# Patient Record
Sex: FEMALE | Race: WHITE | NOT HISPANIC OR LATINO | Employment: FULL TIME | URBAN - METROPOLITAN AREA
[De-identification: names, ages, dates, MRNs, and addresses within clinical notes are randomized per-mention and may not be internally consistent; named-entity substitution may affect disease eponyms.]

---

## 2019-01-04 RX ORDER — IBUPROFEN 800 MG/1
TABLET ORAL
Qty: 90 TABLET | Refills: 1 | Status: SHIPPED | OUTPATIENT
Start: 2019-01-04 | End: 2019-06-03 | Stop reason: SDUPTHER

## 2019-01-09 ENCOUNTER — TELEPHONE (OUTPATIENT)
Dept: INTERNAL MEDICINE | Facility: CLINIC | Age: 49
End: 2019-01-09

## 2019-01-09 DIAGNOSIS — L65.9 HAIR LOSS: ICD-10-CM

## 2019-01-09 DIAGNOSIS — E55.9 VITAMIN D DEFICIENCY: Primary | ICD-10-CM

## 2019-01-09 DIAGNOSIS — R53.83 FATIGUE, UNSPECIFIED TYPE: ICD-10-CM

## 2019-01-09 DIAGNOSIS — E53.8 B12 DEFICIENCY: ICD-10-CM

## 2019-01-09 DIAGNOSIS — D50.9 IRON DEFICIENCY ANEMIA, UNSPECIFIED IRON DEFICIENCY ANEMIA TYPE: ICD-10-CM

## 2019-01-09 NOTE — TELEPHONE ENCOUNTER
PT CALLED STATING THAT SHE HAS AN APPOINTMENT ON 1/11/2019 WITH [facility]. SHE STATES THAT SHE HAS HAD INCREASED HAIR LOSS, EXTREME FATIGUE, AND INCREASED APPETITE. .  SHE WOULD LIKE HER LAB ORDERS TO BE PUT IN PRIOR TO HER APT ON 1/11/2019.

## 2019-01-11 ENCOUNTER — OFFICE VISIT (OUTPATIENT)
Dept: INTERNAL MEDICINE | Facility: CLINIC | Age: 49
End: 2019-01-11

## 2019-01-11 ENCOUNTER — LAB (OUTPATIENT)
Dept: LAB | Facility: HOSPITAL | Age: 49
End: 2019-01-11

## 2019-01-11 VITALS — DIASTOLIC BLOOD PRESSURE: 88 MMHG | HEART RATE: 96 BPM | SYSTOLIC BLOOD PRESSURE: 118 MMHG | WEIGHT: 166 LBS

## 2019-01-11 DIAGNOSIS — E55.9 VITAMIN D DEFICIENCY: ICD-10-CM

## 2019-01-11 DIAGNOSIS — R63.5 ABNORMAL WEIGHT GAIN: ICD-10-CM

## 2019-01-11 DIAGNOSIS — E04.2 GOITER, NONTOXIC, MULTINODULAR: ICD-10-CM

## 2019-01-11 DIAGNOSIS — E53.8 B12 DEFICIENCY: ICD-10-CM

## 2019-01-11 DIAGNOSIS — D50.9 IRON DEFICIENCY ANEMIA, UNSPECIFIED IRON DEFICIENCY ANEMIA TYPE: ICD-10-CM

## 2019-01-11 DIAGNOSIS — E61.1 IRON DEFICIENCY: ICD-10-CM

## 2019-01-11 DIAGNOSIS — F41.1 ANXIETY STATE: ICD-10-CM

## 2019-01-11 DIAGNOSIS — L65.9 HAIR LOSS: ICD-10-CM

## 2019-01-11 DIAGNOSIS — R53.83 FATIGUE, UNSPECIFIED TYPE: ICD-10-CM

## 2019-01-11 DIAGNOSIS — L65.9 HAIR LOSS: Primary | ICD-10-CM

## 2019-01-11 PROBLEM — F51.01 PRIMARY INSOMNIA: Status: ACTIVE | Noted: 2019-01-11

## 2019-01-11 PROBLEM — F33.1 MAJOR DEPRESSIVE DISORDER, RECURRENT, MODERATE: Status: ACTIVE | Noted: 2019-01-11

## 2019-01-11 PROBLEM — M79.10 MYALGIA: Status: ACTIVE | Noted: 2019-01-11

## 2019-01-11 PROBLEM — M54.2 CERVICALGIA: Status: ACTIVE | Noted: 2019-01-11

## 2019-01-11 LAB
25(OH)D3 SERPL-MCNC: 21 NG/ML
ALBUMIN SERPL-MCNC: 4.43 G/DL (ref 3.2–4.8)
ALBUMIN/GLOB SERPL: 2.2 G/DL (ref 1.5–2.5)
ALP SERPL-CCNC: 68 U/L (ref 25–100)
ALT SERPL W P-5'-P-CCNC: 27 U/L (ref 7–40)
ANION GAP SERPL CALCULATED.3IONS-SCNC: 7 MMOL/L (ref 3–11)
AST SERPL-CCNC: 22 U/L (ref 0–33)
BASOPHILS # BLD AUTO: 0.07 10*3/MM3 (ref 0–0.2)
BASOPHILS NFR BLD AUTO: 1.2 % (ref 0–1)
BILIRUB SERPL-MCNC: 0.4 MG/DL (ref 0.3–1.2)
BUN BLD-MCNC: 12 MG/DL (ref 9–23)
BUN/CREAT SERPL: 16.2 (ref 7–25)
CALCIUM SPEC-SCNC: 8.9 MG/DL (ref 8.7–10.4)
CHLORIDE SERPL-SCNC: 102 MMOL/L (ref 99–109)
CO2 SERPL-SCNC: 25 MMOL/L (ref 20–31)
CREAT BLD-MCNC: 0.74 MG/DL (ref 0.6–1.3)
DEPRECATED RDW RBC AUTO: 41.9 FL (ref 37–54)
EOSINOPHIL # BLD AUTO: 0.05 10*3/MM3 (ref 0–0.3)
EOSINOPHIL NFR BLD AUTO: 0.9 % (ref 0–3)
ERYTHROCYTE [DISTWIDTH] IN BLOOD BY AUTOMATED COUNT: 13.9 % (ref 11.3–14.5)
GFR SERPL CREATININE-BSD FRML MDRD: 84 ML/MIN/1.73
GLOBULIN UR ELPH-MCNC: 2 GM/DL
GLUCOSE BLD-MCNC: 88 MG/DL (ref 70–100)
HCT VFR BLD AUTO: 40.3 % (ref 34.5–44)
HGB BLD-MCNC: 12.9 G/DL (ref 11.5–15.5)
IMM GRANULOCYTES # BLD AUTO: 0.01 10*3/MM3 (ref 0–0.03)
IMM GRANULOCYTES NFR BLD AUTO: 0.2 % (ref 0–0.6)
LYMPHOCYTES # BLD AUTO: 1.86 10*3/MM3 (ref 0.6–4.8)
LYMPHOCYTES NFR BLD AUTO: 32.5 % (ref 24–44)
MCH RBC QN AUTO: 26.4 PG (ref 27–31)
MCHC RBC AUTO-ENTMCNC: 32 G/DL (ref 32–36)
MCV RBC AUTO: 82.6 FL (ref 80–99)
MONOCYTES # BLD AUTO: 0.39 10*3/MM3 (ref 0–1)
MONOCYTES NFR BLD AUTO: 6.8 % (ref 0–12)
NEUTROPHILS # BLD AUTO: 3.35 10*3/MM3 (ref 1.5–8.3)
NEUTROPHILS NFR BLD AUTO: 58.6 % (ref 41–71)
PLATELET # BLD AUTO: 322 10*3/MM3 (ref 150–450)
PMV BLD AUTO: 10.3 FL (ref 6–12)
POTASSIUM BLD-SCNC: 3.8 MMOL/L (ref 3.5–5.5)
PROT SERPL-MCNC: 6.4 G/DL (ref 5.7–8.2)
RBC # BLD AUTO: 4.88 10*6/MM3 (ref 3.89–5.14)
SODIUM BLD-SCNC: 134 MMOL/L (ref 132–146)
T4 FREE SERPL-MCNC: 1.13 NG/DL (ref 0.89–1.76)
TSH SERPL DL<=0.05 MIU/L-ACNC: 0.71 MIU/ML (ref 0.35–5.35)
WBC NRBC COR # BLD: 5.72 10*3/MM3 (ref 3.5–10.8)

## 2019-01-11 PROCEDURE — 84443 ASSAY THYROID STIM HORMONE: CPT

## 2019-01-11 PROCEDURE — 99213 OFFICE O/P EST LOW 20 MIN: CPT | Performed by: PHYSICIAN ASSISTANT

## 2019-01-11 PROCEDURE — 84439 ASSAY OF FREE THYROXINE: CPT

## 2019-01-11 PROCEDURE — 80053 COMPREHEN METABOLIC PANEL: CPT

## 2019-01-11 PROCEDURE — 82306 VITAMIN D 25 HYDROXY: CPT

## 2019-01-11 PROCEDURE — 85025 COMPLETE CBC W/AUTO DIFF WBC: CPT

## 2019-01-11 PROCEDURE — 36415 COLL VENOUS BLD VENIPUNCTURE: CPT

## 2019-01-11 RX ORDER — PROMETHAZINE HYDROCHLORIDE 25 MG/1
TABLET ORAL
Refills: 1 | COMMUNITY
Start: 2018-11-14 | End: 2019-07-01 | Stop reason: SDUPTHER

## 2019-01-11 RX ORDER — ALPRAZOLAM 1 MG/1
1 TABLET, EXTENDED RELEASE ORAL EVERY MORNING
COMMUNITY
Start: 2019-01-10

## 2019-01-11 RX ORDER — CYCLOBENZAPRINE HCL 10 MG
TABLET ORAL
Refills: 5 | COMMUNITY
Start: 2018-12-11 | End: 2019-03-13 | Stop reason: SDUPTHER

## 2019-01-11 RX ORDER — LISDEXAMFETAMINE DIMESYLATE 70 MG/1
70 CAPSULE ORAL EVERY MORNING
COMMUNITY
Start: 2019-01-10

## 2019-01-11 RX ORDER — ZIPRASIDONE HYDROCHLORIDE 80 MG/1
80 CAPSULE ORAL 2 TIMES DAILY
Refills: 1 | COMMUNITY
Start: 2018-12-16

## 2019-01-11 RX ORDER — LAMOTRIGINE 150 MG/1
300 TABLET ORAL EVERY MORNING
Refills: 1 | COMMUNITY
Start: 2018-12-16

## 2019-01-11 RX ORDER — SUMATRIPTAN 100 MG/1
TABLET, FILM COATED ORAL
Refills: 8 | COMMUNITY
Start: 2018-12-11 | End: 2019-06-05 | Stop reason: SDUPTHER

## 2019-01-11 RX ORDER — NAPROXEN 500 MG/1
TABLET ORAL
Refills: 11 | COMMUNITY
Start: 2018-12-06 | End: 2019-12-17 | Stop reason: SDUPTHER

## 2019-01-11 RX ORDER — ALPRAZOLAM 1 MG/1
TABLET ORAL
Refills: 1 | COMMUNITY
Start: 2018-12-28 | End: 2023-01-24 | Stop reason: SDUPTHER

## 2019-01-11 NOTE — PROGRESS NOTES
Patient Care Team:  Roula Clayton MD as PCP - General (Internal Medicine)    Chief Complaint;:   Chief Complaint   Patient presents with   • Alopecia   • Fatigue     increased   • Other     Increased appetite         Subjective     HPI  She has had increased hair loss, fatigue and weight gain.   These symptoms are sometimes related to thyroid or iron def.  She has been doing well.   Hx of migraines, fair control, Anxiety and depression have been stable.  Her symptoms started for the last month.  Past Medical History:   Diagnosis Date   • Anxiety    • Bipolar II disorder (CMS/HCC)    • Fibromyalgia    • GERD (gastroesophageal reflux disease)    • Goiter    • Major depressive disorder    • Migraines     4-5 times a week    • Scoliosis    • Sleep apnea        Social History     Socioeconomic History   • Marital status:      Spouse name: Not on file   • Number of children: Not on file   • Years of education: Not on file   • Highest education level: Not on file   Social Needs   • Financial resource strain: Not on file   • Food insecurity - worry: Not on file   • Food insecurity - inability: Not on file   • Transportation needs - medical: Not on file   • Transportation needs - non-medical: Not on file   Occupational History   • Not on file   Tobacco Use   • Smoking status: Never Smoker   • Smokeless tobacco: Never Used   Substance and Sexual Activity   • Alcohol use: No     Frequency: Never   • Drug use: No   • Sexual activity: Not on file   Other Topics Concern   • Not on file   Social History Narrative   • Not on file       Allergies   Allergen Reactions   • Atenolol Other (See Comments)     Suicidal    • Hydrocodone Unknown (See Comments)     Patient is not aware of a reaction to this drug        Review of Systems:     Review of Systems   Constitutional: Positive for appetite change, fatigue and unexpected weight gain. Negative for activity change, chills, diaphoresis, fever and unexpected weight loss.    Respiratory: Negative.    Cardiovascular: Negative.    Endocrine: Negative.    Neurological: Positive for headache.   Hematological: Negative.    Psychiatric/Behavioral: The patient is nervous/anxious.        Vital Signs  Vitals:    01/11/19 1037   BP: 118/88   BP Location: Left arm   Patient Position: Sitting   Cuff Size: Adult   Pulse: 96   Weight: 75.3 kg (166 lb)   PainSc: 0-No pain         Current Outpatient Medications:   •  ALPRAZolam (XANAX) 1 MG tablet, TAKE 1 TABLET BY MOUTH EVERY DAY ** MAY REPEAT 1 TABLET FOR ANXIETY **, Disp: , Rfl: 1  •  ALPRAZolam XR (XANAX XR) 1 MG 24 hr tablet, Take 1 mg by mouth Every Morning., Disp: , Rfl:   •  cyclobenzaprine (FLEXERIL) 10 MG tablet, TAKE 1 TABLET BY MOUTH EVERYDAY AT BEDTIME, Disp: , Rfl: 5  •  ibuprofen (ADVIL,MOTRIN) 800 MG tablet, TAKE 1 TABLET BY MOUTH 3 TIMES A DAY ** TAKE WITH FOOD ** (Patient taking differently: TAKE 1 TABLET BY MOUTH 3 TIMES A DAY ** TAKE WITH FOOD ** AS NEEDED), Disp: 90 tablet, Rfl: 1  •  lamoTRIgine (LaMICtal) 150 MG tablet, Take 300 mg by mouth Every Morning., Disp: , Rfl: 1  •  naproxen (NAPROSYN) 500 MG tablet, TAKE 1 TABLET BY MOUTH TWICE A DAY WITH FOOD AS NEEDED, Disp: , Rfl: 11  •  promethazine (PHENERGAN) 25 MG tablet, TAKE 1 TABLET BY MOUTH EVERY 4 TO 6 HOURS AS NEEDED, Disp: , Rfl: 1  •  SUMAtriptan (IMITREX) 100 MG tablet, TAKE 1 TABLET BY MOUTH EVERY 2 HOURS AS NEEDED MAX 2 TABLETS IN 24 HOURS, Disp: , Rfl: 8  •  VYVANSE 70 MG capsule, Take 70 mg by mouth Every Morning  , Disp: , Rfl:   •  ziprasidone (GEODON) 80 MG capsule, Take 80 mg by mouth 2 (Two) Times a Day., Disp: , Rfl: 1    Physical Exam:    Physical Exam   Constitutional: She is oriented to person, place, and time. She appears well-developed and well-nourished.   HENT:   Head: Normocephalic and atraumatic.   Neck: Normal range of motion. Neck supple.   Cardiovascular: Normal rate, regular rhythm and normal heart sounds.   Pulmonary/Chest: Effort normal and  breath sounds normal.   Abdominal: Soft. Bowel sounds are normal.   Lymphadenopathy:     She has no cervical adenopathy.   Neurological: She is alert and oriented to person, place, and time.   Psychiatric: She has a normal mood and affect. Her behavior is normal. Judgment and thought content normal.   Nursing note and vitals reviewed.        Assessment/Plan   Mildred was seen today for alopecia, fatigue and other.    Diagnoses and all orders for this visit:    Hair loss    Iron deficiency    Vitamin D deficiency    Goiter, nontoxic, multinodular    Anxiety state    Abnormal weight gain      Patient Instructions   She has already had labs done.    Encouraged regular exercise.  Follow up in March as scheduled.      Plan of care reviewed with patient at the conclusion of today's visit. Education was provided regarding diagnosis, management, and any prescribed or recommended OTC medications.Patient verbalizes understanding of and agreement with management plan.     Sheri Collier PA-C

## 2019-01-15 ENCOUNTER — TELEPHONE (OUTPATIENT)
Dept: INTERNAL MEDICINE | Facility: CLINIC | Age: 49
End: 2019-01-15

## 2019-01-15 PROBLEM — J30.1 SEASONAL ALLERGIC RHINITIS DUE TO POLLEN: Status: ACTIVE | Noted: 2019-01-15

## 2019-01-15 PROBLEM — Z12.31 SCREENING MAMMOGRAM, ENCOUNTER FOR: Status: ACTIVE | Noted: 2019-01-15

## 2019-01-15 PROBLEM — E53.8 B12 DEFICIENCY: Status: ACTIVE | Noted: 2019-01-15

## 2019-01-15 PROBLEM — K59.01 SLOW TRANSIT CONSTIPATION: Status: ACTIVE | Noted: 2019-01-15

## 2019-01-15 PROBLEM — R53.83 OTHER FATIGUE: Status: ACTIVE | Noted: 2019-01-15

## 2019-01-15 PROBLEM — N92.6 IRREGULAR PERIODS: Status: ACTIVE | Noted: 2019-01-15

## 2019-01-15 PROBLEM — M54.9 BACK PAIN: Status: ACTIVE | Noted: 2019-01-15

## 2019-01-15 PROBLEM — G47.33 OBSTRUCTIVE SLEEP APNEA: Status: ACTIVE | Noted: 2019-01-15

## 2019-01-15 PROBLEM — G43.109 MIGRAINE WITH AURA AND WITHOUT STATUS MIGRAINOSUS, NOT INTRACTABLE: Status: ACTIVE | Noted: 2019-01-15

## 2019-01-15 NOTE — TELEPHONE ENCOUNTER
"[place]  \"Sheri Mcneill,  I reviewed my lab results.  I do not know which lab was the one checking on my thyroid.  I saw that most everything was normal; however, there were two results that were either below or right at the limit of being low.  Can you explain to me what the lab results show and if there is any thyroid issue or any other deficiency that I may have that could be causing my symptoms.  Hope you're having a good day!  Thank you.  Mildred French\"    I sent this in response  \"The TSH is the thyroid lab also known as thyroid stimulating hormone and that was within normal range as well as the T4 which is apart of the thyroid labs. The CBC or complete blood count had the two levels that were outside the range but nothing to be concerned about. Sheri is out of the office and I will send her your message.  Thank you,  Maria G/BRAIN \"   "

## 2019-02-07 PROBLEM — F33.9 RECURRENT MAJOR DEPRESSION: Status: ACTIVE | Noted: 2019-02-07

## 2019-02-08 ENCOUNTER — OFFICE VISIT (OUTPATIENT)
Dept: INTERNAL MEDICINE | Facility: CLINIC | Age: 49
End: 2019-02-08

## 2019-02-08 VITALS
DIASTOLIC BLOOD PRESSURE: 82 MMHG | HEART RATE: 56 BPM | SYSTOLIC BLOOD PRESSURE: 114 MMHG | TEMPERATURE: 98.8 F | WEIGHT: 166 LBS

## 2019-02-08 DIAGNOSIS — J30.1 SEASONAL ALLERGIC RHINITIS DUE TO POLLEN: ICD-10-CM

## 2019-02-08 DIAGNOSIS — G43.109 MIGRAINE WITH AURA AND WITHOUT STATUS MIGRAINOSUS, NOT INTRACTABLE: Primary | ICD-10-CM

## 2019-02-08 PROCEDURE — 99213 OFFICE O/P EST LOW 20 MIN: CPT | Performed by: PHYSICIAN ASSISTANT

## 2019-02-08 RX ORDER — TOPIRAMATE 25 MG/1
TABLET ORAL
Qty: 60 TABLET | Refills: 0 | Status: SHIPPED | OUTPATIENT
Start: 2019-02-08 | End: 2019-04-06 | Stop reason: SDUPTHER

## 2019-02-08 RX ORDER — FLUTICASONE PROPIONATE 50 MCG
2 SPRAY, SUSPENSION (ML) NASAL DAILY
Qty: 1 BOTTLE | Refills: 5 | Status: SHIPPED | OUTPATIENT
Start: 2019-02-08 | End: 2020-06-07 | Stop reason: SDUPTHER

## 2019-02-08 NOTE — PROGRESS NOTES
Patient Care Team:  Roula Clayton MD as PCP - General (Internal Medicine)    Chief Complaint;:   Chief Complaint   Patient presents with   • Migraine     patient would like to try topamax    • Nasal Congestion   • Alopecia     genetic         Subjective     HPI  49-year-old white female presents to the office today wanting to restart Topamax.  She continues to have familial hair loss.  She had stopped Topamax in the past thinking it was causing her hair loss.  It didn't work very well for her migraine headaches.    She's also having some allergy symptoms with nasal congestion and ear fullness.  Past Medical History:   Diagnosis Date   • Anxiety    • Bipolar II disorder (CMS/[facility])    • Fibromyalgia    • GERD (gastroesophageal reflux disease)    • Goiter    • Major depressive disorder    • Migraines     4-5 times a week    • Scoliosis    • Sleep apnea        Social History     Socioeconomic History   • Marital status:      Spouse name: Not on file   • Number of children: Not on file   • Years of education: Not on file   • Highest education level: Not on file   Social Needs   • Financial resource strain: Not on file   • Food insecurity - worry: Not on file   • Food insecurity - inability: Not on file   • Transportation needs - medical: Not on file   • Transportation needs - non-medical: Not on file   Occupational History   • Not on file   Tobacco Use   • Smoking status: Never Smoker   • Smokeless tobacco: Never Used   Substance and Sexual Activity   • Alcohol use: No     Frequency: Never   • Drug use: No   • Sexual activity: Not on file   Other Topics Concern   • Not on file   Social History Narrative   • Not on file       Allergies   Allergen Reactions   • Atenolol Other (See Comments)     Suicidal    • Hydrocodone Unknown (See Comments)     Patient is not aware of a reaction to this drug        Review of Systems:     Review of Systems   Constitutional: Negative.    HENT: Positive for ear pain, postnasal  drip and rhinorrhea.    Respiratory: Negative.    Cardiovascular: Negative.    Neurological: Positive for headache.       Vital Signs  Vitals:    02/08/19 1100 02/08/19 1123   BP: 110/90 114/82   BP Location: Left arm    Patient Position: Sitting    Cuff Size: Adult    Pulse: 56    Temp: 98.8 °F (37.1 °C)    TempSrc: Temporal    Weight: 75.3 kg (166 lb)    PainSc:   3    PainLoc: Head          Current Outpatient Medications:   •  ALPRAZolam (XANAX) 1 MG tablet, TAKE 1 TABLET BY MOUTH EVERY DAY ** MAY REPEAT 1 TABLET FOR ANXIETY **, Disp: , Rfl: 1  •  ALPRAZolam XR (XANAX XR) 1 MG 24 hr tablet, Take 1 mg by mouth Every Morning., Disp: , Rfl:   •  cyclobenzaprine (FLEXERIL) 10 MG tablet, TAKE 1 TABLET BY MOUTH EVERYDAY AT BEDTIME, Disp: , Rfl: 5  •  ibuprofen (ADVIL,MOTRIN) 800 MG tablet, TAKE 1 TABLET BY MOUTH 3 TIMES A DAY ** TAKE WITH FOOD ** (Patient taking differently: TAKE 1 TABLET BY MOUTH 3 TIMES A DAY ** TAKE WITH FOOD ** AS NEEDED), Disp: 90 tablet, Rfl: 1  •  lamoTRIgine (LaMICtal) 150 MG tablet, Take 300 mg by mouth Every Morning., Disp: , Rfl: 1  •  naproxen (NAPROSYN) 500 MG tablet, TAKE 1 TABLET BY MOUTH TWICE A DAY WITH FOOD AS NEEDED, Disp: , Rfl: 11  •  promethazine (PHENERGAN) 25 MG tablet, TAKE 1 TABLET BY MOUTH EVERY 4 TO 6 HOURS AS NEEDED, Disp: , Rfl: 1  •  SUMAtriptan (IMITREX) 100 MG tablet, TAKE 1 TABLET BY MOUTH EVERY 2 HOURS AS NEEDED MAX 2 TABLETS IN 24 HOURS, Disp: , Rfl: 8  •  VYVANSE 70 MG capsule, Take 70 mg by mouth Every Morning  , Disp: , Rfl:   •  ziprasidone (GEODON) 80 MG capsule, Take 80 mg by mouth 2 (Two) Times a Day., Disp: , Rfl: 1  •  fluticasone (FLONASE) 50 MCG/ACT nasal spray, 2 sprays into the nostril(s) as directed by provider Daily., Disp: 1 bottle, Rfl: 5  •  topiramate (TOPAMAX) 25 MG tablet, Take 1 in am for 1 week, then 1 bid for 1 week, then 2 in am and 1 in pm for 1 week, then 2 po bid, Disp: 60 tablet, Rfl: 0    Physical Exam:    Physical Exam   Constitutional:  She is oriented to person, place, and time. She appears well-developed and well-nourished.   HENT:   Head: Normocephalic and atraumatic.   Right Ear: External ear normal.   Left Ear: External ear normal.   Nose: Nose normal.   Mouth/Throat: Oropharynx is clear and moist.   Neck: Normal range of motion. Neck supple.   Cardiovascular: Normal rate, regular rhythm and normal heart sounds.   Pulmonary/Chest: Effort normal and breath sounds normal.   Lymphadenopathy:     She has no cervical adenopathy.   Neurological: She is alert and oriented to person, place, and time.   Nursing note and vitals reviewed.      Procedures      Assessment/Plan   Problem List Items Addressed This Visit        Cardiovascular and Mediastinum    Migraine with aura and without status migrainosus, not intractable - Primary    Relevant Medications    ibuprofen (ADVIL,MOTRIN) 800 MG tablet    SUMAtriptan (IMITREX) 100 MG tablet    naproxen (NAPROSYN) 500 MG tablet    lamoTRIgine (LaMICtal) 150 MG tablet    cyclobenzaprine (FLEXERIL) 10 MG tablet    topiramate (TOPAMAX) 25 MG tablet       Respiratory    Seasonal allergic rhinitis due to pollen        Patient Instructions   She will start off it Topamax 25 mg in the a.m. for 1 week then 25 mg twice a day for 1 week and then 50 mg in the morning and 25 mg in the evening for 1 week and then 50 twice a day.  She will follow-up March first as scheduled.    Flonase 2 sprays up each nostril once a day.      Plan of care reviewed with patient at the conclusion of today's visit. Education was provided regarding diagnosis, management, and any prescribed or recommended OTC medications.Patient verbalizes understanding of and agreement with management plan.     Sheri Collier PA-C

## 2019-02-08 NOTE — PATIENT INSTRUCTIONS
She will start off it Topamax 25 mg in the a.m. for 1 week then 25 mg twice a day for 1 week and then 50 mg in the morning and 25 mg in the evening for 1 week and then 50 twice a day.  She will follow-up March first as scheduled.    Flonase 2 sprays up each nostril once a day.

## 2019-03-06 RX ORDER — CHLORTHALIDONE 25 MG/1
25 TABLET ORAL DAILY
Qty: 30 TABLET | Refills: 0 | Status: SHIPPED | OUTPATIENT
Start: 2019-03-06 | End: 2019-04-05 | Stop reason: SDUPTHER

## 2019-03-08 RX ORDER — TOPIRAMATE 25 MG/1
TABLET ORAL
Qty: 60 TABLET | Refills: 0 | OUTPATIENT
Start: 2019-03-08

## 2019-03-13 RX ORDER — CYCLOBENZAPRINE HCL 10 MG
TABLET ORAL
Qty: 30 TABLET | Refills: 5 | Status: SHIPPED | OUTPATIENT
Start: 2019-03-13 | End: 2019-11-11 | Stop reason: SDUPTHER

## 2019-04-05 RX ORDER — CHLORTHALIDONE 25 MG/1
25 TABLET ORAL DAILY
Qty: 30 TABLET | Refills: 0 | Status: SHIPPED | OUTPATIENT
Start: 2019-04-05 | End: 2019-05-05 | Stop reason: SDUPTHER

## 2019-04-08 RX ORDER — TOPIRAMATE 25 MG/1
50 TABLET ORAL 2 TIMES DAILY
Qty: 120 TABLET | Refills: 3 | Status: SHIPPED | OUTPATIENT
Start: 2019-04-08 | End: 2020-06-05

## 2019-05-06 RX ORDER — CHLORTHALIDONE 25 MG/1
TABLET ORAL
Qty: 30 TABLET | Refills: 0 | Status: SHIPPED | OUTPATIENT
Start: 2019-05-06 | End: 2020-06-07

## 2019-06-03 RX ORDER — IBUPROFEN 800 MG/1
TABLET ORAL
Qty: 90 TABLET | Refills: 1 | Status: SHIPPED | OUTPATIENT
Start: 2019-06-03 | End: 2019-11-11 | Stop reason: SDUPTHER

## 2019-06-05 RX ORDER — SUMATRIPTAN 100 MG/1
TABLET, FILM COATED ORAL
Qty: 18 TABLET | Refills: 8 | Status: SHIPPED | OUTPATIENT
Start: 2019-06-05 | End: 2020-04-20 | Stop reason: SDUPTHER

## 2019-07-01 RX ORDER — PROMETHAZINE HYDROCHLORIDE 25 MG/1
TABLET ORAL
Qty: 20 TABLET | Refills: 1 | Status: SHIPPED | OUTPATIENT
Start: 2019-07-01 | End: 2019-11-18 | Stop reason: SDUPTHER

## 2019-10-14 NOTE — TELEPHONE ENCOUNTER
Pt called for a refill on Imitrex   I called pt to inform her she should have refills at the [facility] to return call   Patient never called back so I called pharmacy talked to Opal to see if patient had called in a refill or picked it up she said she hadn't and look and saw where it needs a PA she said she will fax it over     Called patient to give her an update she said ok

## 2019-10-21 ENCOUNTER — TELEPHONE (OUTPATIENT)
Dept: INTERNAL MEDICINE | Facility: CLINIC | Age: 49
End: 2019-10-21

## 2019-10-21 NOTE — TELEPHONE ENCOUNTER
Pt called on status of the PA on her Imitrex to get 18 a mo instead of 9  She has 4-5 migraines a week an dis going to run out pretty soon

## 2019-10-22 NOTE — TELEPHONE ENCOUNTER
Sent pt a [employer] message informing her that we haven't received a PA from the pharmacy. Called pharmacy to request that information so we can do it for the pt.

## 2019-10-29 ENCOUNTER — TELEPHONE (OUTPATIENT)
Dept: INTERNAL MEDICINE | Facility: CLINIC | Age: 49
End: 2019-10-29

## 2019-10-29 NOTE — TELEPHONE ENCOUNTER
Called [facility] to Eddi at [facility] in [place] ([phone]) for sumitriptan tablets. (prior auth not done- less than $40.00)    Member ID [id number]  Group [id number]  BIN [id number]  PCN  [id number]    Cost $34.48    Called patient stating we do not do prior auth on prescriptions less than $40, and she stated her insurance would pay $15 if went sent in PA.  Sent in prior auth thru cover my meds and will wait for response.  Notified Opal at [facility].

## 2019-11-11 RX ORDER — CYCLOBENZAPRINE HCL 10 MG
10 TABLET ORAL
Qty: 30 TABLET | Refills: 5 | OUTPATIENT
Start: 2019-11-11

## 2019-11-11 RX ORDER — CYCLOBENZAPRINE HCL 10 MG
TABLET ORAL
Qty: 30 TABLET | Refills: 5 | Status: SHIPPED | OUTPATIENT
Start: 2019-11-11 | End: 2020-04-13

## 2019-11-11 RX ORDER — IBUPROFEN 800 MG/1
800 TABLET ORAL
Qty: 90 TABLET | Refills: 1 | Status: SHIPPED | OUTPATIENT
Start: 2019-11-11 | End: 2020-06-29 | Stop reason: SDUPTHER

## 2019-11-18 RX ORDER — PROMETHAZINE HYDROCHLORIDE 25 MG/1
TABLET ORAL
Qty: 20 TABLET | Refills: 1 | OUTPATIENT
Start: 2019-11-18

## 2019-11-19 RX ORDER — PROMETHAZINE HYDROCHLORIDE 25 MG/1
TABLET ORAL
Qty: 12 TABLET | Refills: 3 | Status: SHIPPED | OUTPATIENT
Start: 2019-11-19 | End: 2020-06-29 | Stop reason: SDUPTHER

## 2019-12-02 ENCOUNTER — PATIENT MESSAGE (OUTPATIENT)
Dept: INTERNAL MEDICINE | Facility: CLINIC | Age: 49
End: 2019-12-02

## 2019-12-02 DIAGNOSIS — Z82.49 FAMILY HISTORY OF HYPERTROPHIC CARDIOMYOPATHY: Primary | ICD-10-CM

## 2019-12-02 NOTE — TELEPHONE ENCOUNTER
From: Mildred French  To: Roula Clayton MD  Sent: 12/2/2019 9:51 AM EST  Subject: Non-Urgent Medical Question    Hi, Dr. Clayton. My 22 year-old son passed away suddenly 10 weeks ago. The autopsy revealed that he had Hypertrophic Cardiomyopathy. The physician who performed the autopsy has instructed me, my ex- and my other son to have genetic testing to see if we carry the gene. What do I do from here? Thanks. Mildred

## 2019-12-09 ENCOUNTER — TELEPHONE (OUTPATIENT)
Dept: GENETICS | Facility: HOSPITAL | Age: 49
End: 2019-12-09

## 2019-12-09 NOTE — TELEPHONE ENCOUNTER
Called to schedule Genetic appt.  She stated one son recently passed and the other son recently had Genetics done in [place].  Refugio suggested she wait for those results before scheduling with us.  She will call back to schedule.

## 2019-12-17 RX ORDER — NAPROXEN 500 MG/1
TABLET ORAL
Qty: 60 TABLET | Refills: 11 | Status: SHIPPED | OUTPATIENT
Start: 2019-12-17 | End: 2020-06-29

## 2019-12-18 RX ORDER — RANITIDINE 150 MG/1
150 CAPSULE ORAL 2 TIMES DAILY
Qty: 180 CAPSULE | Refills: 1 | Status: SHIPPED | OUTPATIENT
Start: 2019-12-18 | End: 2020-05-20

## 2020-02-19 RX ORDER — NAPROXEN 500 MG/1
TABLET ORAL
Qty: 60 TABLET | Refills: 11 | OUTPATIENT
Start: 2020-02-19

## 2020-03-19 ENCOUNTER — HOSPITAL ENCOUNTER (OUTPATIENT)
Dept: ULTRASOUND IMAGING | Facility: HOSPITAL | Age: 50
Discharge: HOME | End: 2020-03-19

## 2020-03-19 ENCOUNTER — HOSPITAL ENCOUNTER (OUTPATIENT)
Dept: MAMMOGRAPHY | Facility: HOSPITAL | Age: 50
Discharge: HOME | End: 2020-03-19
Admitting: PHYSICIAN ASSISTANT

## 2020-03-19 DIAGNOSIS — R92.8 ABNORMAL MAMMOGRAM: ICD-10-CM

## 2020-03-19 PROCEDURE — 76642 ULTRASOUND BREAST LIMITED: CPT | Performed by: RADIOLOGY

## 2020-03-19 PROCEDURE — 77062 BREAST TOMOSYNTHESIS BI: CPT | Performed by: RADIOLOGY

## 2020-03-19 PROCEDURE — 77066 DX MAMMO INCL CAD BI: CPT | Performed by: RADIOLOGY

## 2020-03-19 PROCEDURE — 77066 DX MAMMO INCL CAD BI: CPT

## 2020-03-19 PROCEDURE — 76642 ULTRASOUND BREAST LIMITED: CPT

## 2020-03-19 PROCEDURE — G0279 TOMOSYNTHESIS, MAMMO: HCPCS

## 2020-04-13 RX ORDER — CYCLOBENZAPRINE HCL 10 MG
TABLET ORAL
Qty: 30 TABLET | Refills: 5 | Status: SHIPPED | OUTPATIENT
Start: 2020-04-13 | End: 2020-10-05

## 2020-04-20 DIAGNOSIS — G43.109 MIGRAINE WITH AURA AND WITHOUT STATUS MIGRAINOSUS, NOT INTRACTABLE: Primary | ICD-10-CM

## 2020-04-20 RX ORDER — SUMATRIPTAN 100 MG/1
TABLET, FILM COATED ORAL
Qty: 18 TABLET | Refills: 8 | Status: SHIPPED | OUTPATIENT
Start: 2020-04-20 | End: 2021-01-06 | Stop reason: SDUPTHER

## 2020-04-20 RX ORDER — NAPROXEN 500 MG/1
TABLET ORAL
Qty: 60 TABLET | Refills: 11 | OUTPATIENT
Start: 2020-04-20

## 2020-04-21 NOTE — TELEPHONE ENCOUNTER
Spoke to [facility] to advised request from yesterday was denied.  Patient should have rx on file from December.   She verified rx from 12/17/19 was on file with 11 refills.

## 2020-05-20 RX ORDER — FAMOTIDINE 40 MG/1
40 TABLET, FILM COATED ORAL DAILY
Qty: 90 TABLET | Refills: 1 | Status: SHIPPED | OUTPATIENT
Start: 2020-05-20 | End: 2020-06-05 | Stop reason: SDUPTHER

## 2020-06-05 ENCOUNTER — OFFICE VISIT (OUTPATIENT)
Dept: INTERNAL MEDICINE | Facility: CLINIC | Age: 50
End: 2020-06-05

## 2020-06-05 VITALS
WEIGHT: 178.6 LBS | BODY MASS INDEX: 30.49 KG/M2 | HEIGHT: 64 IN | DIASTOLIC BLOOD PRESSURE: 84 MMHG | TEMPERATURE: 98 F | SYSTOLIC BLOOD PRESSURE: 114 MMHG | HEART RATE: 110 BPM

## 2020-06-05 DIAGNOSIS — G43.109 MIGRAINE WITH AURA AND WITHOUT STATUS MIGRAINOSUS, NOT INTRACTABLE: ICD-10-CM

## 2020-06-05 DIAGNOSIS — E04.2 GOITER, NONTOXIC, MULTINODULAR: ICD-10-CM

## 2020-06-05 DIAGNOSIS — F51.01 PRIMARY INSOMNIA: ICD-10-CM

## 2020-06-05 DIAGNOSIS — M25.551 RIGHT HIP PAIN: ICD-10-CM

## 2020-06-05 DIAGNOSIS — M54.2 CERVICALGIA: ICD-10-CM

## 2020-06-05 DIAGNOSIS — Z00.00 ANNUAL PHYSICAL EXAM: Primary | ICD-10-CM

## 2020-06-05 DIAGNOSIS — G47.33 OBSTRUCTIVE SLEEP APNEA: ICD-10-CM

## 2020-06-05 DIAGNOSIS — K59.01 SLOW TRANSIT CONSTIPATION: ICD-10-CM

## 2020-06-05 DIAGNOSIS — F33.1 MAJOR DEPRESSIVE DISORDER, RECURRENT, MODERATE (HCC): ICD-10-CM

## 2020-06-05 DIAGNOSIS — E53.8 B12 DEFICIENCY: ICD-10-CM

## 2020-06-05 DIAGNOSIS — E61.1 IRON DEFICIENCY: ICD-10-CM

## 2020-06-05 DIAGNOSIS — E66.09 CLASS 1 OBESITY DUE TO EXCESS CALORIES WITH SERIOUS COMORBIDITY AND BODY MASS INDEX (BMI) OF 30.0 TO 30.9 IN ADULT: Chronic | ICD-10-CM

## 2020-06-05 DIAGNOSIS — J30.1 SEASONAL ALLERGIC RHINITIS DUE TO POLLEN: ICD-10-CM

## 2020-06-05 DIAGNOSIS — E55.9 VITAMIN D DEFICIENCY: ICD-10-CM

## 2020-06-05 PROBLEM — E66.811 CLASS 1 OBESITY DUE TO EXCESS CALORIES WITH SERIOUS COMORBIDITY AND BODY MASS INDEX (BMI) OF 30.0 TO 30.9 IN ADULT: Chronic | Status: ACTIVE | Noted: 2020-06-05

## 2020-06-05 PROCEDURE — 99396 PREV VISIT EST AGE 40-64: CPT | Performed by: INTERNAL MEDICINE

## 2020-06-05 PROCEDURE — 99213 OFFICE O/P EST LOW 20 MIN: CPT | Performed by: INTERNAL MEDICINE

## 2020-06-05 RX ORDER — FAMOTIDINE 40 MG/1
40 TABLET, FILM COATED ORAL 2 TIMES DAILY
Qty: 180 TABLET | Refills: 1 | Status: SHIPPED | OUTPATIENT
Start: 2020-06-05 | End: 2021-01-18

## 2020-06-05 RX ORDER — POLYETHYLENE GLYCOL 3350 17 G/17G
17 POWDER, FOR SOLUTION ORAL DAILY
Qty: 850 G | Refills: 5 | Status: SHIPPED | OUTPATIENT
Start: 2020-06-05 | End: 2021-02-19 | Stop reason: SDUPTHER

## 2020-06-05 NOTE — PATIENT INSTRUCTIONS
Patient Instructions   Problem List Items Addressed This Visit        Cardiovascular and Mediastinum    Migraine with aura and without status migrainosus, not intractable    Overview     6/7/2020 Roula Clayton MD    Continue sumatriptan as needed for acute headache.  Take ibuprofen with it at the onset of the headache.  Continue trying to get a good night sleep.    Sleep apnea does often cause morning headaches.  Losing weight will help.  Also sleeping on the side may help some.         Relevant Medications    lamoTRIgine (LaMICtal) 150 MG tablet    ibuprofen (ADVIL,MOTRIN) 800 MG tablet    naproxen (NAPROSYN) 500 MG tablet    cyclobenzaprine (FLEXERIL) 10 MG tablet    SUMAtriptan (IMITREX) 100 MG tablet       Respiratory    Seasonal allergic rhinitis due to pollen    Overview     6/7/2020 Roula Clayton MD    Resume fluticasone twice a day as needed for allergies.         Relevant Medications    ibuprofen (ADVIL,MOTRIN) 800 MG tablet    naproxen (NAPROSYN) 500 MG tablet    Obstructive sleep apnea    Overview     6/7/2020 Roula Clayton MD    Work on weight loss.            Digestive    Class 1 obesity due to excess calories with serious comorbidity and body mass index (BMI) of 30.0 to 30.9 in adult (Chronic)    Overview     6/5/2020 Roula Clayton MD    Snack less and walk more.         Iron deficiency    Overview     6/7/2020 Roula Clayton MD      Recheck iron levels.         Vitamin D deficiency    Overview     6/7/2020 Roula Clayton MD    Check blood level.         Slow transit constipation    Overview     6/5/2020 Roula Clayton MD    Try a whole capful of miralax daily in hot coffee or tea every morning.    If not working, start trulance tablet daily.  Samples given today.    Drink lots of water every day.    New medication added today. Benefits and possible side effects discussed. Patient verbalized understanding.           B12 deficiency    Overview     6/7/2020 Roula Clayton  MD    Recheck blood level.            Endocrine    Goiter, nontoxic, multinodular    Overview     6/7/2020 Roula Clayton MD    Goiter is unchanged on exam.  She is euthyroid.  We will continue to check thyroid labs.            Nervous and Auditory    Cervicalgia    Overview     6/7/2020 Roula Clayton MD    Use a moist heat pack on the neck to relax tight muscles.  Practice good posture, especially when working.  Do a few neck stretches throughout the workday.    Keeping the neck muscles relaxed helps prevent headaches as well.         Right hip pain    Overview     6/7/2020 Roula Clayton MD    Use moist heat on the lateral and anterior hip areas.  Hip exercises given today.  Try to do a few of them every day.  She will let us know if not improving.            Other    Primary insomnia    Overview     6/7/2020 Roula Clayton MD    We discussed sleep hygiene including going to bed at the same time and getting up at the same time every day, going to bed early enough to get 7 or 8 hours in bed, reading and relaxing before bedtime, and avoiding the TV, computer, phone, iPad close to bedtime.           Major depressive disorder, recurrent, moderate (CMS/HCC)    Overview     6/7/2020 Roula Clayton MD    Continue Geodon, lamotrigine, Xanax, and Vyvanse.  Continue regular follow-up with the psychiatrist.    Part-time work is also very helpful.  Try to walk some several days a week.  Getting outside and doing a little physical activity is very helpful for depression and anxiety and stress.         Relevant Medications    ziprasidone (GEODON) 80 MG capsule    VYVANSE 70 MG capsule    ALPRAZolam XR (XANAX XR) 1 MG 24 hr tablet    ALPRAZolam (XANAX) 1 MG tablet      Other Visit Diagnoses     Annual physical exam    -  Primary           Hip Exercises  Ask your health care provider which exercises are safe for you. Do exercises exactly as told by your health care provider and adjust them as directed. It is  normal to feel mild stretching, pulling, tightness, or discomfort as you do these exercises. Stop right away if you feel sudden pain or your pain gets worse. Do not begin these exercises until told by your health care provider.  Stretching and range-of-motion exercises  These exercises warm up your muscles and joints and improve the movement and flexibility of your hip. These exercises also help to relieve pain, numbness, and tingling. You may be asked to limit your range of motion if you had a hip replacement. Talk to your health care provider about these restrictions.  Hamstrings, supine    1. Lie on your back (supine position).  2. Loop a belt or towel over the ball of your left / right foot. The ball of your foot is on the walking surface, right under your toes.  3. Straighten your left / right knee and slowly pull on the belt or towel to raise your leg until you feel a gentle stretch behind your knee (hamstring).  ? Do not let your knee bend while you do this.  ? Keep your other leg flat on the floor.  4. Hold this position for __________ seconds.  5. Slowly return your leg to the starting position.  Repeat __________ times. Complete this exercise __________ times a day.  Hip rotation    1. Lie on your back on a firm surface.  2. With your left / right hand, gently pull your left / right knee toward the shoulder that is on the same side of the body. Stop when your knee is pointing toward the ceiling.  3. Hold your left / right ankle with your other hand.  4. Keeping your knee steady, gently pull your left / right ankle toward your other shoulder until you feel a stretch in your buttocks.  ? Keep your hips and shoulders firmly planted while you do this stretch.  5. Hold this position for __________ seconds.  Repeat __________ times. Complete this exercise __________ times a day.  Seated stretch  This exercise is sometimes called hamstrings and adductors stretch.  1. Sit on the floor with your legs stretched  wide. Keep your knees straight during this exercise.  2. Keeping your head and back in a straight line, bend at your waist to reach for your left foot (position A). You should feel a stretch in your right inner thigh (adductors).  3. Hold this position for __________ seconds. Then slowly return to the upright position.  4. Keeping your head and back in a straight line, bend at your waist to reach forward (position B). You should feel a stretch behind both of your thighs and knees (hamstrings).  5. Hold this position for __________ seconds. Then slowly return to the upright position.  6. Keeping your head and back in a straight line, bend at your waist to reach for your right foot (position C). You should feel a stretch in your left inner thigh (adductors).  7. Hold this position for __________ seconds. Then slowly return to the upright position.  Repeat __________ times. Complete this exercise __________ times a day.  Lunge  This exercise stretches the muscles of the hip (hip flexors).  1. Place your left / right knee on the floor and bend your other knee so that is directly over your ankle. You should be half-kneeling.  2. Keep good posture with your head over your shoulders.  3. Tighten your buttocks to point your tailbone downward. This will prevent your back from arching too much.  4. You should feel a gentle stretch in the front of your left / right thigh and hip. If you do not feel a stretch, slide your other foot forward slightly and then slowly lunge forward with your chest up until your knee once again lines up over your ankle.  ? Make sure your tailbone continues to point downward.  5. Hold this position for __________ seconds.  6. Slowly return to the starting position.  Repeat __________ times. Complete this exercise __________ times a day.  Strengthening exercises  These exercises build strength and endurance in your hip. Endurance is the ability to use your muscles for a long time, even after they get  tired.  Bridge  This exercise strengthens the muscles of your hip (hip extensors).  1. Lie on your back on a firm surface with your knees bent and your feet flat on the floor.  2. Tighten your buttocks muscles and lift your bottom off the floor until the trunk of your body and your hips are level with your thighs.  ? Do not arch your back.  ? You should feel the muscles working in your buttocks and the back of your thighs. If you do not feel these muscles, slide your feet 1-2 inches (2.5-5 cm) farther away from your buttocks.  3. Hold this position for __________ seconds.  4. Slowly lower your hips to the starting position.  5. Let your muscles relax completely between repetitions.  Repeat __________ times. Complete this exercise __________ times a day.  Straight leg raises, side-lying  This exercise strengthens the muscles that move the hip joint away from the center of the body (hip abductors).  1. Lie on your side with your left / right leg in the top position. Lie so your head, shoulder, hip, and knee line up. You may bend your bottom knee slightly to help you balance.  2. Roll your hips slightly forward, so your hips are stacked directly over each other and your left / right knee is facing forward.  3. Leading with your heel, lift your top leg 4-6 inches (10-15 cm). You should feel the muscles in your top hip lifting.  ? Do not let your foot drift forward.  ? Do not let your knee roll toward the ceiling.  4. Hold this position for __________ seconds.  5. Slowly return to the starting position.  6. Let your muscles relax completely between repetitions.  Repeat __________ times. Complete this exercise __________ times a day.  Straight leg raises, side-lying  This exercise strengthen the muscles that move the hip joint toward the center of the body (hip adductors).  1. Lie on your side with your left / right leg in the bottom position. Lie so your head, shoulder, hip, and knee line up. You may place your upper  foot in front to help you balance.  2. Roll your hips slightly forward, so your hips are stacked directly over each other and your left / right knee is facing forward.  3. Tense the muscles in your inner thigh and lift your bottom leg 4-6 inches (10-15 cm).  4. Hold this position for __________ seconds.  5. Slowly return to the starting position.  6. Let your muscles relax completely between repetitions.  Repeat __________ times. Complete this exercise __________ times a day.  Straight leg raises, supine  This exercise strengthens the muscles in the front of your thigh (quadriceps).  1. Lie on your back (supine position) with your left / right leg extended and your other knee bent.  2. Tense the muscles in the front of your left / right thigh. You should see your kneecap slide up or see increased dimpling just above your knee.  3. Keep these muscles tight as you raise your leg 4-6 inches (10-15 cm) off the floor. Do not let your knee bend.  4. Hold this position for __________ seconds.  5. Keep these muscles tense as you lower your leg.  6. Relax the muscles slowly and completely between repetitions.  Repeat __________ times. Complete this exercise __________ times a day.  Hip abductors, standing  This exercise strengthens the muscles that move the leg and hip joint away from the center of the body (hip abductors).  1. Tie one end of a rubber exercise band or tubing to a secure surface, such as a chair, table, or pole.  2. Loop the other end of the band or tubing around your left / right ankle.  3. Keeping your ankle with the band or tubing directly opposite the secured end, step away until there is tension in the tubing or band. Hold on to a chair, table, or pole as needed for balance.  4. Lift your left / right leg out to your side. While you do this:  ? Keep your back upright.  ? Keep your shoulders over your hips.  ? Keep your toes pointing forward.  ? Make sure to use your hip muscles to slowly lift your leg.  Do not tip your body or forcefully lift your leg.  5. Hold this position for __________ seconds.  6. Slowly return to the starting position.  Repeat __________ times. Complete this exercise __________ times a day.  Squats  This exercise strengthens the muscles in the front of your thigh (quadriceps).  1.  a door frame so your feet and knees are in line with the frame. You may place your hands on the frame for balance.  2. Slowly bend your knees and lower your hips like you are going to sit in a chair.  ? Keep your lower legs in a straight-up-and-down position.  ? Do not let your hips go lower than your knees.  ? Do not bend your knees lower than told by your health care provider.  ? If your hip pain increases, do not bend as low.  3. Hold this position for ___________ seconds.  4. Slowly push with your legs to return to standing. Do not use your hands to pull yourself to standing.  Repeat __________ times. Complete this exercise __________ times a day.  This information is not intended to replace advice given to you by your health care provider. Make sure you discuss any questions you have with your health care provider.  Document Released: 01/05/2007 Document Revised: 10/29/2019 Document Reviewed: 10/29/2019  [employer] Patient Education © 2020 [employer]

## 2020-06-05 NOTE — PROGRESS NOTES
QUICK REFERENCE INFORMATION:  The ABCs of the Annual Wellness Visit    Annual Wellness visit    HEALTH RISK ASSESSMENT    1970    Recent History  No hospitalization(s) within the last year..        Current Medical Providers:  Patient Care Team:  Roula Clayton MD as PCP - General (Internal Medicine)        Smoking Status:  Social History     Tobacco Use   Smoking Status Never Smoker   Smokeless Tobacco Never Used       Alcohol Consumption:  Social History     Substance and Sexual Activity   Alcohol Use No   • Frequency: Never       Depression Screen:   PHQ-2/PHQ-9 Depression Screening 6/5/2020   Little interest or pleasure in doing things 3   Feeling down, depressed, or hopeless 2   Trouble falling or staying asleep, or sleeping too much 2   Feeling tired or having little energy 2   Poor appetite or overeating 2   Feeling bad about yourself - or that you are a failure or have let yourself or your family down 3   Trouble concentrating on things, such as reading the newspaper or watching television 3   Moving or speaking so slowly that other people could have noticed. Or the opposite - being so fidgety or restless that you have been moving around a lot more than usual 1   Thoughts that you would be better off dead, or of hurting yourself in some way 0   Total Score 18   If you checked off any problems, how difficult have these problems made it for you to do your work, take care of things at home, or get along with other people? Somewhat difficult       Health Habits:              Recent Lab Results:  CMP:  Lab Results   Component Value Date    BUN 12 01/11/2019    CREATININE 0.74 01/11/2019    EGFRIFNONA 84 01/11/2019    BCR 16.2 01/11/2019     01/11/2019    K 3.8 01/11/2019    CO2 25.0 01/11/2019    CALCIUM 8.9 01/11/2019    ALBUMIN 4.43 01/11/2019    BILITOT 0.4 01/11/2019    ALKPHOS 68 01/11/2019    AST 22 01/11/2019    ALT 27 01/11/2019     Lipid Panel:     HbA1c:           Age-appropriate Screening  Schedule:  Refer to the list below for future screening recommendations based on patient's age, sex and/or medical conditions. Orders for these recommended tests are listed in the plan section. The patient has been provided with a written plan.    Age appropriate preventive counseling done including age appropriate vaccines,regular  Mammogram and self breast exam, pap smear, colonoscopy, regular dental visits, mental health, injury prevention such as wearing seat belt and preventing falls, healthy  nutrition, healthy weight, regular physical exercise. Alcohol use is none.  Tobacco history-none. Drug use-none.  STD's-not at risk.          Health Maintenance   Topic Date Due   • INFLUENZA VACCINE  08/01/2020   • TDAP/TD VACCINES (2 - Td) 02/21/2022   • PAP SMEAR  11/13/2022        Subjective   History of Present Illness    Mildred French is a 50 y.o. female who presents for an Annual Wellness Visit and follow up chronic conditions including sleep apnea, migraines, allergies, chronic constipation, goiter, insomnia.    HPI  R hip pain for a couple months-lateral and anterior pain.  It is described as mild.  Intermittent.  Does not occur every day.  She has not usually needed to take anything for it.  She has tried naproxen and it did help.  It does not prevent her from walking or doing other activities.    CHRONIC CONDITIONS    Depression has been worse since son passed away at 22y in September of Brugada syndrome.  Pathologist also noted on autopsy that he had hypertrophic cardiomyopathy although the cardiologist had said he did not have it. Her younger  Son tried to do CPR but didn't really know the procedure. Sees psychiatrist every 2 months.  She feels that her medications are very helpful.  She does not feel that she needs any change in medication doses at this time.  Working part time.  She feels working has helped her a lot.  She states that she is not nearly as depressed as she was years ago.  Will go to  a support group at [facility] starting in the fall.    Migraines have been more often due to the stress since last fall.  She states that they are not as severe as they were in years past.  Sumatriptan and ibuprofen help a lot.  She does not feel she needs to change medications at this point.  She understands not to take ibuprofen and naproxen in the same day.    Neck muscles get tight and tender.  Especially on workdays.  She does try to do some stretches.    She has not been exercising and has been eating more comfort foods than usual due to her grief and just not wanting to get out and do things.  She does enjoy going to work and states that her boss has been very helpful and very understanding.    Lots of heartburn lately. Famotidine started 5/20 and it does help.    Constipation continues to be a chronic problem.  She does try to eat a lot of vegetables and fruits and drink a lot of fluids.  She has not tried MiraLAX recently.  She is willing to try it again.  We also discussed the prescription medications for chronic constipation.          The following portions of the patient's history were reviewed and updated as appropriate: allergies, current medications, past family history, past medical history, past social history, past surgical history and problem list.    Outpatient Medications Prior to Visit   Medication Sig Dispense Refill   • ALPRAZolam (XANAX) 1 MG tablet TAKE 1 TABLET BY MOUTH EVERY DAY ** MAY REPEAT 1 TABLET FOR ANXIETY **  1   • ALPRAZolam XR (XANAX XR) 1 MG 24 hr tablet Take 1 mg by mouth Every Morning.     • cyclobenzaprine (FLEXERIL) 10 MG tablet TAKE 1 TABLET BY MOUTH EVERYDAY AT BEDTIME 30 tablet 5   • ibuprofen (ADVIL,MOTRIN) 800 MG tablet Take 1 tablet by mouth 3 (Three) Times a Day With Meals. 90 tablet 1   • lamoTRIgine (LaMICtal) 150 MG tablet Take 300 mg by mouth Every Morning.  1   • naproxen (NAPROSYN) 500 MG tablet TAKE 1 TABLET BY MOUTH TWICE A DAY WITH FOOD AS NEEDED 60 tablet 11    • promethazine (PHENERGAN) 25 MG tablet TAKE 1 TABLET BY MOUTH EVERY 4 TO 6 HOURS AS NEEDED 12 tablet 3   • SUMAtriptan (IMITREX) 100 MG tablet Take one tablet at onset of headache. May repeat dose one time in 2 hours if headache not relieved. 18 tablet 8   • VYVANSE 70 MG capsule Take 70 mg by mouth Every Morning       • ziprasidone (GEODON) 80 MG capsule Take 80 mg by mouth 2 (Two) Times a Day.  1   • famotidine (PEPCID) 40 MG tablet Take 1 tablet by mouth Daily. 90 tablet 1   • chlorthalidone (HYGROTON) 25 MG tablet TAKE 1 TABLET BY MOUTH EVERY DAY 30 tablet 0   • fluticasone (FLONASE) 50 MCG/ACT nasal spray 2 sprays into the nostril(s) as directed by provider Daily. 1 bottle 5   • topiramate (TOPAMAX) 25 MG tablet Take 2 tablets by mouth 2 (Two) Times a Day. 120 tablet 3     No facility-administered medications prior to visit.        Patient Active Problem List   Diagnosis   • Primary insomnia   • Iron deficiency   • Vitamin D deficiency   • Goiter, nontoxic, multinodular   • Cervicalgia   • Myalgia   • Abnormal weight gain   • Major depressive disorder, recurrent, moderate (CMS/HCC)   • Anxiety state   • Hair loss   • Screening mammogram, encounter for   • Irregular periods   • Seasonal allergic rhinitis due to pollen   • Other fatigue   • Migraine with aura and without status migrainosus, not intractable   • Slow transit constipation   • B12 deficiency   • Obstructive sleep apnea   • Back pain   • Class 1 obesity due to excess calories with serious comorbidity and body mass index (BMI) of 30.0 to 30.9 in adult   • Right hip pain       Advance Care Planning:  ACP discussion was held with the patient during this visit. Patient does not have an advance directive, declines further assistance.    Identification of Risk Factors:  Risk factors include: Cardiovascular risk  Obesity/Overweight .    Review of Systems   Constitutional: Negative for chills, fatigue and fever.   HENT: Negative for congestion, ear pain,  hearing loss and sinus pressure.    Eyes: Negative for visual disturbance.   Respiratory: Negative for cough, chest tightness, shortness of breath and wheezing.    Cardiovascular: Negative for chest pain, palpitations and leg swelling.   Gastrointestinal: Negative for abdominal pain, blood in stool, constipation and diarrhea.   Endocrine: Negative for cold intolerance and heat intolerance.   Genitourinary: Negative for dysuria and frequency.   Musculoskeletal: Positive for arthralgias. Negative for back pain and gait problem.   Skin: Negative for color change and rash.   Allergic/Immunologic: Positive for environmental allergies. Negative for food allergies.   Neurological: Positive for headaches. Negative for dizziness, speech difficulty, weakness and numbness.   Hematological: Negative for adenopathy. Does not bruise/bleed easily.   Psychiatric/Behavioral: Positive for dysphoric mood and sleep disturbance. Negative for confusion and suicidal ideas. The patient is nervous/anxious.         Stress       Compared to one year ago, the patient feels her physical health is the same.  Compared to one year ago, the patient feels her mental health is worse.    Objective     Physical Exam   Constitutional: She is oriented to person, place, and time. She appears well-developed and well-nourished.   HENT:   Head: Normocephalic.   Eyes: Pupils are equal, round, and reactive to light. Conjunctivae and EOM are normal.   Neck: Normal range of motion. Neck supple. No thyromegaly present.   Cardiovascular: Normal rate, regular rhythm, normal heart sounds and intact distal pulses.   Pulmonary/Chest: Effort normal and breath sounds normal. She has no wheezes. Right breast exhibits no inverted nipple, no mass, no nipple discharge, no skin change and no tenderness. Left breast exhibits no inverted nipple, no mass, no nipple discharge, no skin change and no tenderness.   Abdominal: Soft. Bowel sounds are normal. There is no tenderness.  "  Musculoskeletal: Normal range of motion. She exhibits no edema.        Right hip: She exhibits no tenderness.        Left hip: She exhibits no tenderness.        Cervical back: She exhibits tenderness and spasm.   Lymphadenopathy:     She has no cervical adenopathy.     She has no axillary adenopathy.   Neurological: She is alert and oriented to person, place, and time. She has normal strength. No cranial nerve deficit or sensory deficit. Coordination and gait normal.   Skin: Skin is warm and dry. No rash noted.   Psychiatric: She has a normal mood and affect. Her speech is normal and behavior is normal. Judgment and thought content normal. Cognition and memory are normal.   Nursing note and vitals reviewed.      Vitals:    06/05/20 0832   BP: 114/84   BP Location: Right arm   Patient Position: Sitting   Cuff Size: Adult   Pulse: 110   Temp: 98 °F (36.7 °C)   TempSrc: Temporal   Weight: 81 kg (178 lb 9.6 oz)   Height: 162.6 cm (64\")   PainSc: 0-No pain       Patient's Body mass index is 30.66 kg/m². BMI is above normal parameters. Recommendations include: educational material, exercise counseling and nutrition counseling.      CMP:  Lab Results   Component Value Date    BUN 12 01/11/2019    CREATININE 0.74 01/11/2019    EGFRIFNONA 84 01/11/2019    BCR 16.2 01/11/2019     01/11/2019    K 3.8 01/11/2019    CO2 25.0 01/11/2019    CALCIUM 8.9 01/11/2019    ALBUMIN 4.43 01/11/2019    BILITOT 0.4 01/11/2019    ALKPHOS 68 01/11/2019    AST 22 01/11/2019    ALT 27 01/11/2019     HbA1c:  No results found for: HGBA1C  Microalbumin:  No results found for: MICROALBUR, POCMALB, POCCREAT  Lipid Panel  Lab Results   Component Value Date    AST 22 01/11/2019    ALT 27 01/11/2019       Assessment/Plan   Patient Self-Management and Personalized Health Advice  The patient has been provided with information about: diet, exercise, weight management and prevention of cardiac or vascular disease and preventive services including: "   · Annual Wellness Visit (AWV).  Patient Instructions   Problem List Items Addressed This Visit        Cardiovascular and Mediastinum    Migraine with aura and without status migrainosus, not intractable    Overview     6/7/2020 Roula Clayton MD    Continue sumatriptan as needed for acute headache.  Take ibuprofen with it at the onset of the headache.  Continue trying to get a good night sleep.    Sleep apnea does often cause morning headaches.  Losing weight will help.  Also sleeping on the side may help some.         Relevant Medications    lamoTRIgine (LaMICtal) 150 MG tablet    ibuprofen (ADVIL,MOTRIN) 800 MG tablet    naproxen (NAPROSYN) 500 MG tablet    cyclobenzaprine (FLEXERIL) 10 MG tablet    SUMAtriptan (IMITREX) 100 MG tablet       Respiratory    Seasonal allergic rhinitis due to pollen    Overview     6/7/2020 Roula Clayton MD    Resume fluticasone twice a day as needed for allergies.         Relevant Medications    ibuprofen (ADVIL,MOTRIN) 800 MG tablet    naproxen (NAPROSYN) 500 MG tablet    Obstructive sleep apnea    Overview     6/7/2020 Roula Clayton MD    Work on weight loss.            Digestive    Class 1 obesity due to excess calories with serious comorbidity and body mass index (BMI) of 30.0 to 30.9 in adult (Chronic)    Overview     6/5/2020 Roula Clayton MD    Snack less and walk more.         Iron deficiency    Overview     6/7/2020 Roula Clayton MD      Recheck iron levels.         Vitamin D deficiency    Overview     6/7/2020 Roula Clayton MD    Check blood level.         Slow transit constipation    Overview     6/5/2020 Roula Clayton MD    Try a whole capful of miralax daily in hot coffee or tea every morning.    If not working, start trulance tablet daily.  Samples given today.    Drink lots of water every day.    New medication added today. Benefits and possible side effects discussed. Patient verbalized understanding.           B12 deficiency    Overview      6/7/2020 Roula Clayton MD    Recheck blood level.            Endocrine    Goiter, nontoxic, multinodular    Overview     6/7/2020 Roula Clayton MD    Goiter is unchanged on exam.  She is euthyroid.  We will continue to check thyroid labs.            Nervous and Auditory    Cervicalgia    Overview     6/7/2020 Roula Clayton MD    Use a moist heat pack on the neck to relax tight muscles.  Practice good posture, especially when working.  Do a few neck stretches throughout the workday.    Keeping the neck muscles relaxed helps prevent headaches as well.         Right hip pain    Overview     6/7/2020 Roula Clayton MD    Use moist heat on the lateral and anterior hip areas.  Hip exercises given today.  Try to do a few of them every day.  She will let us know if not improving.            Other    Primary insomnia    Overview     6/7/2020 Roula Clayton MD    We discussed sleep hygiene including going to bed at the same time and getting up at the same time every day, going to bed early enough to get 7 or 8 hours in bed, reading and relaxing before bedtime, and avoiding the TV, computer, phone, iPad close to bedtime.           Major depressive disorder, recurrent, moderate (CMS/HCC)    Overview     6/7/2020 Roula Clayton MD    Continue Geodon, lamotrigine, Xanax, and Vyvanse.  Continue regular follow-up with the psychiatrist.    Part-time work is also very helpful.  Try to walk some several days a week.  Getting outside and doing a little physical activity is very helpful for depression and anxiety and stress.         Relevant Medications    ziprasidone (GEODON) 80 MG capsule    VYVANSE 70 MG capsule    ALPRAZolam XR (XANAX XR) 1 MG 24 hr tablet    ALPRAZolam (XANAX) 1 MG tablet      Other Visit Diagnoses     Annual physical exam    -  Primary             Diagnosis Plan   1. Annual physical exam     2. Right hip pain     3. Migraine with aura and without status migrainosus, not intractable     4.  Cervicalgia     5. Obstructive sleep apnea     6. Slow transit constipation     7. Major depressive disorder, recurrent, moderate (CMS/HCC)     8. Goiter, nontoxic, multinodular     9. Primary insomnia     10. Class 1 obesity due to excess calories with serious comorbidity and body mass index (BMI) of 30.0 to 30.9 in adult     11. B12 deficiency     12. Vitamin D deficiency     13. Iron deficiency     14. Seasonal allergic rhinitis due to pollen         Outpatient Encounter Medications as of 6/5/2020   Medication Sig Dispense Refill   • ALPRAZolam (XANAX) 1 MG tablet TAKE 1 TABLET BY MOUTH EVERY DAY ** MAY REPEAT 1 TABLET FOR ANXIETY **  1   • ALPRAZolam XR (XANAX XR) 1 MG 24 hr tablet Take 1 mg by mouth Every Morning.     • cyclobenzaprine (FLEXERIL) 10 MG tablet TAKE 1 TABLET BY MOUTH EVERYDAY AT BEDTIME 30 tablet 5   • famotidine (PEPCID) 40 MG tablet Take 1 tablet by mouth 2 (Two) Times a Day. 180 tablet 1   • ibuprofen (ADVIL,MOTRIN) 800 MG tablet Take 1 tablet by mouth 3 (Three) Times a Day With Meals. 90 tablet 1   • lamoTRIgine (LaMICtal) 150 MG tablet Take 300 mg by mouth Every Morning.  1   • naproxen (NAPROSYN) 500 MG tablet TAKE 1 TABLET BY MOUTH TWICE A DAY WITH FOOD AS NEEDED 60 tablet 11   • promethazine (PHENERGAN) 25 MG tablet TAKE 1 TABLET BY MOUTH EVERY 4 TO 6 HOURS AS NEEDED 12 tablet 3   • SUMAtriptan (IMITREX) 100 MG tablet Take one tablet at onset of headache. May repeat dose one time in 2 hours if headache not relieved. 18 tablet 8   • VYVANSE 70 MG capsule Take 70 mg by mouth Every Morning       • ziprasidone (GEODON) 80 MG capsule Take 80 mg by mouth 2 (Two) Times a Day.  1   • [DISCONTINUED] famotidine (PEPCID) 40 MG tablet Take 1 tablet by mouth Daily. 90 tablet 1   • fluticasone (Flonase) 50 MCG/ACT nasal spray 1 spray into the nostril(s) as directed by provider 2 (Two) Times a Day As Needed for Rhinitis or Allergies. 1 bottle 5   • Plecanatide (Trulance) 3 MG tablet Take 1 tablet by  mouth Daily. 30 tablet 0   • polyethylene glycol (GlycoLax) 17 GM/SCOOP powder Take 17 g by mouth Daily. 850 g 5   • [DISCONTINUED] chlorthalidone (HYGROTON) 25 MG tablet TAKE 1 TABLET BY MOUTH EVERY DAY 30 tablet 0   • [DISCONTINUED] fluticasone (FLONASE) 50 MCG/ACT nasal spray 2 sprays into the nostril(s) as directed by provider Daily. 1 bottle 5   • [DISCONTINUED] topiramate (TOPAMAX) 25 MG tablet Take 2 tablets by mouth 2 (Two) Times a Day. 120 tablet 3     No facility-administered encounter medications on file as of 6/5/2020.        Reviewed use of high risk medication in the elderly: not applicable  Reviewed for potential of harmful drug interactions in the elderly: not applicable    Follow Up:  Return in about 6 months (around 12/5/2020) for Recheck.     An After Visit Summary and PPPS with all of these plans were given to the patient.           Note: Part of this note may be an electronic transcription/translation of spoken language to printed text using the Dragon Dictation System.

## 2020-06-07 PROBLEM — M25.551 RIGHT HIP PAIN: Status: ACTIVE | Noted: 2020-06-07

## 2020-06-07 RX ORDER — FLUTICASONE PROPIONATE 50 MCG
1 SPRAY, SUSPENSION (ML) NASAL 2 TIMES DAILY PRN
Qty: 1 BOTTLE | Refills: 5
Start: 2020-06-07 | End: 2021-04-22

## 2020-06-15 RX ORDER — RANITIDINE 150 MG/1
TABLET ORAL
Qty: 180 TABLET | Refills: 1 | OUTPATIENT
Start: 2020-06-15

## 2020-06-25 DIAGNOSIS — G43.109 MIGRAINE WITH AURA AND WITHOUT STATUS MIGRAINOSUS, NOT INTRACTABLE: Primary | ICD-10-CM

## 2020-06-29 RX ORDER — PROMETHAZINE HYDROCHLORIDE 25 MG/1
25 TABLET ORAL EVERY 8 HOURS PRN
Qty: 12 TABLET | Refills: 3 | Status: SHIPPED | OUTPATIENT
Start: 2020-06-29 | End: 2021-02-19 | Stop reason: SDUPTHER

## 2020-06-29 RX ORDER — IBUPROFEN 800 MG/1
800 TABLET ORAL
Qty: 90 TABLET | Refills: 1 | Status: SHIPPED | OUTPATIENT
Start: 2020-06-29 | End: 2021-01-06

## 2020-07-27 ENCOUNTER — TELEPHONE (OUTPATIENT)
Dept: INTERNAL MEDICINE | Facility: CLINIC | Age: 50
End: 2020-07-27

## 2020-07-27 NOTE — TELEPHONE ENCOUNTER
Please look to see what happened to the neurology referral that I put in on 6/25/2020.  The patient checked with Dr. Amador's office and they still have not received the referral

## 2020-08-10 ENCOUNTER — TELEPHONE (OUTPATIENT)
Dept: INTERNAL MEDICINE | Facility: CLINIC | Age: 50
End: 2020-08-10

## 2020-08-10 NOTE — TELEPHONE ENCOUNTER
Please See Documentation in referral:    PT is an established pt of Dr. Butler; they just needed the pt to call to schedule.  I spoke w/ pt on 7/31/2020 & she was made aware of this.

## 2020-08-10 NOTE — TELEPHONE ENCOUNTER
Regarding: Referral Request  ----- Message from Yaritza Otoole LPN sent at 8/10/2020 10:30 AM EDT -----       ----- Message from Mildred French to Roula Clayton MD sent at 8/10/2020 10:26 AM -----   Hello, I just talked to Dr. Reinaldo Oneil's office.  They said they have not received a referral to be seen for my migraines.  Someone from your office called me and said that they had sent one in on July 10 but they do not have it.  Please fax another one to them asap.  I have to get in to see them asap as I have met my deductible this year and need to try botox (which is what they recommended in 2017 but it was too expensive).  Their fax number is [phone].  Please mention that I need to be seen in [place] for my initial appointment and then [place] after that.  They said that this was important.  Thank you so much.  The lady over referrals is Gabriella.

## 2020-10-01 ENCOUNTER — HOSPITAL ENCOUNTER (OUTPATIENT)
Dept: MAMMOGRAPHY | Facility: HOSPITAL | Age: 50
Discharge: HOME | End: 2020-10-01
Admitting: PHYSICIAN ASSISTANT

## 2020-10-01 ENCOUNTER — PATIENT MESSAGE (OUTPATIENT)
Dept: INTERNAL MEDICINE | Facility: CLINIC | Age: 50
End: 2020-10-01

## 2020-10-01 DIAGNOSIS — R92.8 ABNORMAL MAMMOGRAM: ICD-10-CM

## 2020-10-01 PROCEDURE — 77065 DX MAMMO INCL CAD UNI: CPT

## 2020-10-01 PROCEDURE — 77061 BREAST TOMOSYNTHESIS UNI: CPT | Performed by: RADIOLOGY

## 2020-10-01 PROCEDURE — G0279 TOMOSYNTHESIS, MAMMO: HCPCS

## 2020-10-01 PROCEDURE — 77065 DX MAMMO INCL CAD UNI: CPT | Performed by: RADIOLOGY

## 2020-10-01 NOTE — TELEPHONE ENCOUNTER
From: Mildred French  To: Roula Clayton MD  Sent: 10/1/2020 1:29 PM EDT  Subject: Prescription Question    Hello, You have prescribed a medication that starts with an F (the generic name) for heartburn. It does a great job with heartburn but I am now experiencing acid reflux as well and am wondering if this medication is supposed to help with acid reflux as well or if I need an additional medication to help with this?? Please let me know. Thank you.

## 2020-10-05 RX ORDER — CYCLOBENZAPRINE HCL 10 MG
TABLET ORAL
Qty: 30 TABLET | Refills: 5 | Status: SHIPPED | OUTPATIENT
Start: 2020-10-05 | End: 2021-04-19 | Stop reason: SDUPTHER

## 2021-01-06 DIAGNOSIS — G43.109 MIGRAINE WITH AURA AND WITHOUT STATUS MIGRAINOSUS, NOT INTRACTABLE: ICD-10-CM

## 2021-01-06 RX ORDER — NAPROXEN 500 MG/1
500 TABLET ORAL 2 TIMES DAILY PRN
Qty: 60 TABLET | Refills: 5 | Status: SHIPPED | OUTPATIENT
Start: 2021-01-06 | End: 2021-07-13

## 2021-01-06 RX ORDER — SUMATRIPTAN 100 MG/1
TABLET, FILM COATED ORAL
Qty: 18 TABLET | Refills: 8 | Status: SHIPPED | OUTPATIENT
Start: 2021-01-06 | End: 2021-12-20 | Stop reason: SDUPTHER

## 2021-01-18 RX ORDER — FAMOTIDINE 40 MG/1
TABLET, FILM COATED ORAL
Qty: 180 TABLET | Refills: 1 | Status: SHIPPED | OUTPATIENT
Start: 2021-01-18 | End: 2021-07-16 | Stop reason: SDUPTHER

## 2021-01-20 ENCOUNTER — PRIOR AUTHORIZATION (OUTPATIENT)
Dept: INTERNAL MEDICINE | Facility: CLINIC | Age: 51
End: 2021-01-20

## 2021-02-19 RX ORDER — PROMETHAZINE HYDROCHLORIDE 25 MG/1
25 TABLET ORAL EVERY 8 HOURS PRN
Qty: 12 TABLET | Refills: 3 | Status: SHIPPED | OUTPATIENT
Start: 2021-02-19 | End: 2021-06-23 | Stop reason: SDUPTHER

## 2021-02-19 RX ORDER — POLYETHYLENE GLYCOL 3350 17 G/17G
17 POWDER, FOR SOLUTION ORAL DAILY
Qty: 850 G | Refills: 5 | Status: SHIPPED | OUTPATIENT
Start: 2021-02-19 | End: 2023-01-27

## 2021-03-02 ENCOUNTER — APPOINTMENT (OUTPATIENT)
Dept: MAMMOGRAPHY | Facility: HOSPITAL | Age: 51
End: 2021-03-02

## 2021-03-05 ENCOUNTER — TELEPHONE (OUTPATIENT)
Dept: INTERNAL MEDICINE | Facility: CLINIC | Age: 51
End: 2021-03-05

## 2021-03-18 ENCOUNTER — BULK ORDERING (OUTPATIENT)
Dept: CASE MANAGEMENT | Facility: OTHER | Age: 51
End: 2021-03-18

## 2021-03-18 DIAGNOSIS — Z23 IMMUNIZATION DUE: ICD-10-CM

## 2021-03-23 ENCOUNTER — HOSPITAL ENCOUNTER (OUTPATIENT)
Dept: MAMMOGRAPHY | Facility: HOSPITAL | Age: 51
Discharge: HOME | End: 2021-03-23
Admitting: RADIOLOGY

## 2021-03-23 DIAGNOSIS — R92.8 ABNORMAL MAMMOGRAM: ICD-10-CM

## 2021-03-23 PROCEDURE — 77062 BREAST TOMOSYNTHESIS BI: CPT | Performed by: RADIOLOGY

## 2021-03-23 PROCEDURE — 77066 DX MAMMO INCL CAD BI: CPT

## 2021-03-23 PROCEDURE — 77066 DX MAMMO INCL CAD BI: CPT | Performed by: RADIOLOGY

## 2021-03-23 PROCEDURE — G0279 TOMOSYNTHESIS, MAMMO: HCPCS

## 2021-03-25 ENCOUNTER — HOSPITAL ENCOUNTER (OUTPATIENT)
Dept: MAMMOGRAPHY | Facility: HOSPITAL | Age: 51
Discharge: HOME | End: 2021-03-25

## 2021-03-25 DIAGNOSIS — R92.8 ABNORMAL MAMMOGRAM: ICD-10-CM

## 2021-03-25 PROCEDURE — 19081 BX BREAST 1ST LESION STRTCTC: CPT | Performed by: RADIOLOGY

## 2021-03-25 PROCEDURE — A4648 IMPLANTABLE TISSUE MARKER: HCPCS

## 2021-03-25 PROCEDURE — 77065 DX MAMMO INCL CAD UNI: CPT | Performed by: RADIOLOGY

## 2021-03-29 LAB — LAB AP CASE REPORT: NORMAL

## 2021-03-30 ENCOUNTER — TELEPHONE (OUTPATIENT)
Dept: MAMMOGRAPHY | Facility: HOSPITAL | Age: 51
End: 2021-03-30

## 2021-04-19 ENCOUNTER — PATIENT MESSAGE (OUTPATIENT)
Dept: INTERNAL MEDICINE | Facility: CLINIC | Age: 51
End: 2021-04-19

## 2021-04-19 RX ORDER — CYCLOBENZAPRINE HCL 10 MG
10 TABLET ORAL
Qty: 30 TABLET | Refills: 2 | Status: SHIPPED | OUTPATIENT
Start: 2021-04-19 | End: 2021-07-23 | Stop reason: SDUPTHER

## 2021-04-19 NOTE — TELEPHONE ENCOUNTER
From: Mildred French  To: Roula Clayton MD  Sent: 4/19/2021 10:22 AM EDT  Subject: Non-Urgent Medical Question    Hello, ABRAHAM am coming in on Friday, April 23, 2021 at 11:30 a.m. for an appointment. I had my annual physical last year and I believe that I was supposed to have my annual labs drawn; however, I had not fasted that morning so I could not. I am wanting to go ahead and have them drawn on Friday morning before my appointment. Is the order still at the lab? If not, can you order the labs? I will probably be at the lab first thing since I live an hour [place] a half away and will not be able to eat anything until after they are drawn. Please advise as to whether I should plan on getting the labs drawn. Thank you.

## 2021-04-23 ENCOUNTER — LAB (OUTPATIENT)
Dept: LAB | Facility: HOSPITAL | Age: 51
End: 2021-04-23

## 2021-04-23 ENCOUNTER — OFFICE VISIT (OUTPATIENT)
Dept: INTERNAL MEDICINE | Facility: CLINIC | Age: 51
End: 2021-04-23

## 2021-04-23 VITALS
WEIGHT: 151.4 LBS | TEMPERATURE: 96.9 F | HEART RATE: 80 BPM | SYSTOLIC BLOOD PRESSURE: 110 MMHG | BODY MASS INDEX: 25.85 KG/M2 | DIASTOLIC BLOOD PRESSURE: 78 MMHG | HEIGHT: 64 IN

## 2021-04-23 DIAGNOSIS — E61.1 IRON DEFICIENCY: ICD-10-CM

## 2021-04-23 DIAGNOSIS — R07.89 CHEST PAIN, ATYPICAL: Chronic | ICD-10-CM

## 2021-04-23 DIAGNOSIS — Z00.00 ANNUAL PHYSICAL EXAM: ICD-10-CM

## 2021-04-23 DIAGNOSIS — M54.2 CERVICALGIA: ICD-10-CM

## 2021-04-23 DIAGNOSIS — F41.1 GENERALIZED ANXIETY DISORDER: Chronic | ICD-10-CM

## 2021-04-23 DIAGNOSIS — G47.33 OBSTRUCTIVE SLEEP APNEA: ICD-10-CM

## 2021-04-23 DIAGNOSIS — R09.81 SINUS CONGESTION: Chronic | ICD-10-CM

## 2021-04-23 DIAGNOSIS — E66.3 OVERWEIGHT WITH BODY MASS INDEX (BMI) OF 26 TO 26.9 IN ADULT: Chronic | ICD-10-CM

## 2021-04-23 DIAGNOSIS — B37.31 YEAST VAGINITIS: ICD-10-CM

## 2021-04-23 DIAGNOSIS — E04.2 GOITER, NONTOXIC, MULTINODULAR: ICD-10-CM

## 2021-04-23 DIAGNOSIS — F33.1 MAJOR DEPRESSIVE DISORDER, RECURRENT, MODERATE (HCC): ICD-10-CM

## 2021-04-23 DIAGNOSIS — E53.8 B12 DEFICIENCY: ICD-10-CM

## 2021-04-23 DIAGNOSIS — M25.50 ARTHRALGIA OF MULTIPLE SITES: Chronic | ICD-10-CM

## 2021-04-23 DIAGNOSIS — G43.109 MIGRAINE WITH AURA AND WITHOUT STATUS MIGRAINOSUS, NOT INTRACTABLE: Primary | ICD-10-CM

## 2021-04-23 LAB
25(OH)D3 SERPL-MCNC: 34.2 NG/ML
ALBUMIN SERPL-MCNC: 4.3 G/DL (ref 3.5–5.2)
ALBUMIN/GLOB SERPL: 1.6 G/DL
ALP SERPL-CCNC: 56 U/L (ref 39–117)
ALT SERPL W P-5'-P-CCNC: 11 U/L (ref 1–33)
ANION GAP SERPL CALCULATED.3IONS-SCNC: 12.1 MMOL/L (ref 5–15)
AST SERPL-CCNC: 16 U/L (ref 1–32)
BASOPHILS # BLD AUTO: 0.07 10*3/MM3 (ref 0–0.2)
BASOPHILS NFR BLD AUTO: 1.1 % (ref 0–1.5)
BILIRUB SERPL-MCNC: 0.3 MG/DL (ref 0–1.2)
BILIRUB UR QL STRIP: NEGATIVE
BUN SERPL-MCNC: 18 MG/DL (ref 6–20)
BUN/CREAT SERPL: 22 (ref 7–25)
CALCIUM SPEC-SCNC: 9.3 MG/DL (ref 8.6–10.5)
CHLORIDE SERPL-SCNC: 103 MMOL/L (ref 98–107)
CHOLEST SERPL-MCNC: 227 MG/DL (ref 0–200)
CLARITY UR: CLEAR
CO2 SERPL-SCNC: 23.9 MMOL/L (ref 22–29)
COLOR UR: YELLOW
CREAT SERPL-MCNC: 0.82 MG/DL (ref 0.57–1)
DEPRECATED RDW RBC AUTO: 48.5 FL (ref 37–54)
EOSINOPHIL # BLD AUTO: 0.06 10*3/MM3 (ref 0–0.4)
EOSINOPHIL NFR BLD AUTO: 0.9 % (ref 0.3–6.2)
ERYTHROCYTE [DISTWIDTH] IN BLOOD BY AUTOMATED COUNT: 14.7 % (ref 12.3–15.4)
FERRITIN SERPL-MCNC: 28 NG/ML (ref 13–150)
GFR SERPL CREATININE-BSD FRML MDRD: 74 ML/MIN/1.73
GLOBULIN UR ELPH-MCNC: 2.7 GM/DL
GLUCOSE SERPL-MCNC: 85 MG/DL (ref 65–99)
GLUCOSE UR STRIP-MCNC: NEGATIVE MG/DL
HCT VFR BLD AUTO: 44 % (ref 34–46.6)
HDLC SERPL-MCNC: 51 MG/DL (ref 40–60)
HGB BLD-MCNC: 14.4 G/DL (ref 12–15.9)
HGB UR QL STRIP.AUTO: NEGATIVE
IMM GRANULOCYTES # BLD AUTO: 0.02 10*3/MM3 (ref 0–0.05)
IMM GRANULOCYTES NFR BLD AUTO: 0.3 % (ref 0–0.5)
IRON 24H UR-MRATE: 122 MCG/DL (ref 37–145)
IRON SATN MFR SERPL: 28 % (ref 20–50)
KETONES UR QL STRIP: ABNORMAL
LDLC SERPL CALC-MCNC: 158 MG/DL (ref 0–100)
LDLC/HDLC SERPL: 3.05 {RATIO}
LEUKOCYTE ESTERASE UR QL STRIP.AUTO: NEGATIVE
LYMPHOCYTES # BLD AUTO: 2.14 10*3/MM3 (ref 0.7–3.1)
LYMPHOCYTES NFR BLD AUTO: 32.9 % (ref 19.6–45.3)
MCH RBC QN AUTO: 29 PG (ref 26.6–33)
MCHC RBC AUTO-ENTMCNC: 32.7 G/DL (ref 31.5–35.7)
MCV RBC AUTO: 88.5 FL (ref 79–97)
MONOCYTES # BLD AUTO: 0.46 10*3/MM3 (ref 0.1–0.9)
MONOCYTES NFR BLD AUTO: 7.1 % (ref 5–12)
NEUTROPHILS NFR BLD AUTO: 3.76 10*3/MM3 (ref 1.7–7)
NEUTROPHILS NFR BLD AUTO: 57.7 % (ref 42.7–76)
NITRITE UR QL STRIP: NEGATIVE
NRBC BLD AUTO-RTO: 0 /100 WBC (ref 0–0.2)
PH UR STRIP.AUTO: 6.5 [PH] (ref 5–8)
PLATELET # BLD AUTO: 292 10*3/MM3 (ref 140–450)
PMV BLD AUTO: 10.9 FL (ref 6–12)
POTASSIUM SERPL-SCNC: 3.9 MMOL/L (ref 3.5–5.2)
PROT SERPL-MCNC: 7 G/DL (ref 6–8.5)
PROT UR QL STRIP: ABNORMAL
RBC # BLD AUTO: 4.97 10*6/MM3 (ref 3.77–5.28)
SODIUM SERPL-SCNC: 139 MMOL/L (ref 136–145)
SP GR UR STRIP: >=1.03 (ref 1–1.03)
T3FREE SERPL-MCNC: 2.31 PG/ML (ref 2–4.4)
T4 FREE SERPL-MCNC: 1.11 NG/DL (ref 0.93–1.7)
TIBC SERPL-MCNC: 440 MCG/DL (ref 298–536)
TRANSFERRIN SERPL-MCNC: 295 MG/DL (ref 200–360)
TRIGL SERPL-MCNC: 102 MG/DL (ref 0–150)
TSH SERPL DL<=0.05 MIU/L-ACNC: 1.33 UIU/ML (ref 0.27–4.2)
UROBILINOGEN UR QL STRIP: ABNORMAL
VIT B12 BLD-MCNC: 627 PG/ML (ref 211–946)
VLDLC SERPL-MCNC: 18 MG/DL (ref 5–40)
WBC # BLD AUTO: 6.51 10*3/MM3 (ref 3.4–10.8)

## 2021-04-23 PROCEDURE — 80053 COMPREHEN METABOLIC PANEL: CPT

## 2021-04-23 PROCEDURE — 99215 OFFICE O/P EST HI 40 MIN: CPT | Performed by: INTERNAL MEDICINE

## 2021-04-23 PROCEDURE — 80061 LIPID PANEL: CPT

## 2021-04-23 PROCEDURE — 84481 FREE ASSAY (FT-3): CPT

## 2021-04-23 PROCEDURE — 84439 ASSAY OF FREE THYROXINE: CPT

## 2021-04-23 PROCEDURE — 82306 VITAMIN D 25 HYDROXY: CPT

## 2021-04-23 PROCEDURE — 82607 VITAMIN B-12: CPT

## 2021-04-23 PROCEDURE — 83540 ASSAY OF IRON: CPT

## 2021-04-23 PROCEDURE — 93000 ELECTROCARDIOGRAM COMPLETE: CPT | Performed by: INTERNAL MEDICINE

## 2021-04-23 PROCEDURE — 85025 COMPLETE CBC W/AUTO DIFF WBC: CPT

## 2021-04-23 PROCEDURE — 84466 ASSAY OF TRANSFERRIN: CPT

## 2021-04-23 PROCEDURE — 82728 ASSAY OF FERRITIN: CPT

## 2021-04-23 PROCEDURE — 84443 ASSAY THYROID STIM HORMONE: CPT

## 2021-04-23 PROCEDURE — 81003 URINALYSIS AUTO W/O SCOPE: CPT

## 2021-04-23 RX ORDER — FLUCONAZOLE 150 MG/1
150 TABLET ORAL DAILY PRN
Qty: 3 TABLET | Refills: 0 | Status: SHIPPED | OUTPATIENT
Start: 2021-04-23

## 2021-04-23 RX ORDER — SUMATRIPTAN 6 MG/.5ML
6 INJECTION, SOLUTION SUBCUTANEOUS ONCE
Qty: 5 ML | Refills: 5 | Status: SHIPPED | OUTPATIENT
Start: 2021-04-23 | End: 2021-12-20 | Stop reason: SDUPTHER

## 2021-04-23 RX ORDER — FLUTICASONE PROPIONATE 50 MCG
1 SPRAY, SUSPENSION (ML) NASAL NIGHTLY
Start: 2021-04-23 | End: 2021-05-11 | Stop reason: SDUPTHER

## 2021-04-23 NOTE — PROGRESS NOTES
"[facility]     Mildred French  1970   [id number]      Patient Care Team:  Roula Clayton MD as PCP - General (Internal Medicine)  Reinaldo Oneil MD as Consulting Physician (Neurology)    Chief Complaint   Patient presents with   • Headache     Having more frequently and have become more painful, would like an MRI   • Achy     states she is having these \"spells\" where she just aches all over, has happened   • Chest Tightness     has had 2 episodes where her chest felt very tight, both subsided on there own, denies any pain into arm, jaw or back   • Vaginitis     Started about 4 days ago, painful and itchy w/discharge            HPI  Patient is a 51 y.o. female presents with migraines more frequent and sometimes more painful but otherwise the same in character and symptoms. Chronicity chronic recurrent. Severity severe.  Symptoms are associated with R temple pain and slurred speech sometimes and blurred vision sometimes. Those 2 symptoms are actually less prevalent than in past.. Pertinent negatives no numbness, tingling, weakness, trouble walking, fever chills.   Symptoms are aggravated by sometimes by weather changes, definitely by stress.  She has been under a lot of stress over the last 2 years.  One of her sons  at age 22 and her other son is suffering from depression but will not seek help.    Symptoms improve with  imitrex.  Context  No aura.  Headaches in R temple as they always have been.  Quality sharp steady pain, sometimes throbbing.    Sees neurologist in [place], Dr. Oneil. He has rec botox and new injectable meds but she is afraid to take new meds.     HPI  Chest pain mid chest -2 episodes lasting up to 45 minutes.  No shortness of breath or palpitations or edema. One time she was out shopping. Not exertional. May be stress related. Has been having a lot of relux and heartburn. Famotidine helps a lot. May still have symptoms if eat before bed.     HPI  2 episodes of " "severe arthralgias, aching all over. \"so bad I can't function.\" May last 2-3 hours then totally clears up. Associated with nausea. No swollen or red joints. Not related to activity.No diarrhea or abdominal pain or rashes.    HPI  Sinus congestion-had nasal sprays and steroid shot recently and they helped some. Using flonase at night only and that helps. Ears were painful but now better.     HPI  Vaginal DC and itching and irritation about 4 days. Has not treated it yet. Has not had recent antibiotics.    CHRONIC CONDITIONS   Anxiety helped some by geodon and lamictal and alprazolam. Sees her psychiatrist every 2 months. Not seeing a counselor now. Takes vyvanse for ADD.Had Covid test 2-3 wks ago.    She has been eating less sugars and carbs.She also had her lapband filled.  Lost 30 lb. Feels much better.  She does not get much exercise or walking.    Past Medical History:   Diagnosis Date   • Anxiety    • Bipolar II disorder (CMS/HCC)    • Fibromyalgia    • GERD (gastroesophageal reflux disease)    • Goiter    • Major depressive disorder    • Migraines     4-5 times a week    • Scoliosis    • Sleep apnea        Past Surgical History:   Procedure Laterality Date   • BARIATRIC SURGERY     •  SECTION  1997   •  SECTION  1988   • TONSILLECTOMY     • WISDOM TOOTH EXTRACTION         Family History   Problem Relation Age of Onset   • Thyroid disease Mother    • Hypertension Mother    • Hyperlipidemia Mother    • Obesity Mother    • Depression Mother    • Obesity Sister    • Depression Sister    • Thyroid disease Sister    • Thyroid disease Maternal Aunt    • Coronary artery disease Maternal Grandmother    • Thyroid disease Father    • Diabetes Other    • Sudden death Son 22        Brugada syn   • Heart defect Son 21        Positive for Brugada syndrome.  No structural heart disease.  Asymptomatic   • Breast cancer Neg Hx        Social History     Socioeconomic History   • Marital status: " "     Spouse name: Not on file   • Number of children: Not on file   • Years of education: Not on file   • Highest education level: Not on file   Tobacco Use   • Smoking status: Never Smoker   • Smokeless tobacco: Never Used   Substance and Sexual Activity   • Alcohol use: No   • Drug use: No       Allergies   Allergen Reactions   • Atenolol Other (See Comments)     Suicidal    • Hydrocodone Unknown (See Comments)     Patient is not aware of a reaction to this drug    • Topamax [Topiramate] Anxiety       Review of Systems:     Review of Systems   Constitutional: Negative for chills, fatigue and fever.   HENT: Positive for congestion, ear pain and sinus pressure. Negative for sore throat and swollen glands.    Respiratory: Positive for chest tightness. Negative for cough, shortness of breath and wheezing.    Cardiovascular: Positive for chest pain. Negative for palpitations and leg swelling.   Gastrointestinal: Positive for nausea and GERD. Negative for diarrhea.   Genitourinary: Positive for vaginal discharge. Negative for dysuria and frequency.   Musculoskeletal: Positive for arthralgias and myalgias. Negative for back pain, gait problem and joint swelling.   Neurological: Positive for speech difficulty and headache. Negative for weakness, numbness and memory problem.   Hematological: Negative for adenopathy. Does not bruise/bleed easily.   Psychiatric/Behavioral: Positive for dysphoric mood and stress. Negative for suicidal ideas. The patient is nervous/anxious.        Vital Signs  Vitals:    04/23/21 1120   BP: 110/78   BP Location: Left arm   Patient Position: Sitting   Cuff Size: Adult   Pulse: 80   Temp: 96.9 °F (36.1 °C)   TempSrc: Infrared   Weight: 68.7 kg (151 lb 6.4 oz)   Height: 162.6 cm (64\")   PainSc: 0-No pain     Body mass index is 25.99 kg/m².      Current Outpatient Medications:   •  ALPRAZolam (XANAX) 1 MG tablet, TAKE 1 TABLET BY MOUTH EVERY DAY ** MAY REPEAT 1 TABLET FOR ANXIETY **, Disp: " , Rfl: 1  •  ALPRAZolam XR (XANAX XR) 1 MG 24 hr tablet, Take 1 mg by mouth Every Morning., Disp: , Rfl:   •  cyclobenzaprine (FLEXERIL) 10 MG tablet, Take 1 tablet by mouth every night at bedtime., Disp: 30 tablet, Rfl: 2  •  famotidine (PEPCID) 40 MG tablet, TAKE 1 TABLET BY MOUTH TWICE A DAY, Disp: 180 tablet, Rfl: 1  •  lamoTRIgine (LaMICtal) 150 MG tablet, Take 300 mg by mouth Every Morning., Disp: , Rfl: 1  •  naproxen (Naprosyn) 500 MG tablet, Take 1 tablet by mouth 2 (Two) Times a Day As Needed for Headache., Disp: 60 tablet, Rfl: 5  •  promethazine (PHENERGAN) 25 MG tablet, Take 1 tablet by mouth Every 8 (Eight) Hours As Needed for Nausea or Vomiting., Disp: 12 tablet, Rfl: 3  •  SUMAtriptan (IMITREX) 100 MG tablet, Take one tablet at onset of headache. May repeat dose one time in 2 hours if headache not relieved., Disp: 18 tablet, Rfl: 8  •  VYVANSE 70 MG capsule, Take 70 mg by mouth Every Morning  , Disp: , Rfl:   •  ziprasidone (GEODON) 80 MG capsule, Take 80 mg by mouth 2 (Two) Times a Day., Disp: , Rfl: 1  •  fluconazole (DIFLUCAN) 150 MG tablet, Take 1 tablet by mouth Daily As Needed (yeast vaginitis)., Disp: 3 tablet, Rfl: 0  •  fluticasone (Flonase) 50 MCG/ACT nasal spray, 1 spray into the nostril(s) as directed by provider Every Night., Disp: , Rfl:   •  Plecanatide (Trulance) 3 MG tablet, Take 1 tablet by mouth Daily., Disp: 30 tablet, Rfl: 0  •  polyethylene glycol (GlycoLax) 17 GM/SCOOP powder, Take 17 g by mouth Daily., Disp: 850 g, Rfl: 5  •  SUMAtriptan (IMITREX) 6 MG/0.5ML injection, Inject prescribed dose at onset of headache. May repeat dose one time in 1 hour(s) if headache not relieved., Disp: 5 mL, Rfl: 5    Physical Exam:    Physical Exam  Vitals and nursing note reviewed.   Constitutional:       Appearance: She is well-developed and overweight.   HENT:      Head: Normocephalic.      Jaw: No trismus or tenderness.      Salivary Glands: Right salivary gland is not diffusely enlarged.  Left salivary gland is not diffusely enlarged.      Right Ear: Tympanic membrane, ear canal and external ear normal.      Left Ear: Tympanic membrane, ear canal and external ear normal.      Nose: Mucosal edema and congestion present. No nasal tenderness.   Eyes:      Conjunctiva/sclera: Conjunctivae normal.      Pupils: Pupils are equal, round, and reactive to light.   Neck:      Thyroid: No thyroid mass, thyromegaly or thyroid tenderness.      Vascular: No carotid bruit.      Trachea: Trachea normal.   Cardiovascular:      Rate and Rhythm: Normal rate and regular rhythm.      Heart sounds: Normal heart sounds.   Pulmonary:      Effort: Pulmonary effort is normal.      Breath sounds: Normal breath sounds. No wheezing.   Musculoskeletal:         General: Normal range of motion.      Cervical back: Normal range of motion and neck supple.      Comments: No signs of arthritis or inflammation on exam in any joints.   Lymphadenopathy:      Cervical: No cervical adenopathy.   Skin:     General: Skin is warm and dry.   Neurological:      Mental Status: She is alert and oriented to person, place, and time.      Cranial Nerves: Cranial nerves are intact.      Motor: Motor function is intact.      Gait: Gait is intact.   Psychiatric:         Attention and Perception: Attention normal.         Mood and Affect: Affect normal. Mood is anxious.         Speech: Speech normal.         Behavior: Behavior normal.         Thought Content: Thought content normal.         Judgment: Judgment normal.          ECG 12 Lead    Date/Time: 4/23/2021 12:21 PM  Performed by: Roula Clayton MD  Authorized by: Roula Clayton MD   Comparison: compared with previous ECG   Similar to previous ECG  Rhythm: sinus tachycardia  Rate: normal  BPM: 104  Conduction: conduction normal  ST Segments: ST segments normal  T Waves: T waves normal  QRS axis: normal    Clinical impression: abnormal EKG            ACE III MINI        Results Review:     None    CMP:  Lab Results   Component Value Date    BUN 12 01/11/2019    CREATININE 0.74 01/11/2019    EGFRIFNONA 84 01/11/2019    BCR 16.2 01/11/2019     01/11/2019    K 3.8 01/11/2019    CO2 25.0 01/11/2019    CALCIUM 8.9 01/11/2019    ALBUMIN 4.43 01/11/2019    BILITOT 0.4 01/11/2019    ALKPHOS 68 01/11/2019    AST 22 01/11/2019    ALT 27 01/11/2019     HbA1c:  No results found for: HGBA1C  Microalbumin:  No results found for: MICROALBUR, POCMALB, POCCREAT  Lipid Panel  Lab Results   Component Value Date    AST 22 01/11/2019    ALT 27 01/11/2019       Medication Review: Medications reviewed and noted  Patient Instructions   Problem List Items Addressed This Visit        ENT    Sinus congestion (Chronic)    Overview     4/23/2021 Roula Clayton MD    Symptoms have been improving after steroid injection and using Flonase nasal spray.  She will continue Flonase every evening.  Also recommend taking Mucinex as needed.            Endocrine and Metabolic    Goiter, nontoxic, multinodular    Overview     4/23/2021 Roula Clayton MD    Goiter is unchanged on exam.  She is euthyroid.  We will continue to check thyroid labs.         B12 deficiency    Overview     4/23/2021 Roula Clayton MD    Recheck blood level.            Genitourinary and Reproductive     Yeast vaginitis    Overview     4/23/2021 Roula Clayton MD    Take fluconazole tablet, 1 a day as needed.         Relevant Medications    fluconazole (DIFLUCAN) 150 MG tablet       Hematology and Neoplasia    Iron deficiency    Overview     4/23/2021 Roula Clayton MD      Recheck iron levels.            Mental Health    Generalized anxiety disorder (Chronic)    Overview     4/23/2021 Roula Clayton MD    Anxiety has been worse due to very high stress level over the last couple of years.    Continue current medications and regular follow-up with the psychiatrist.    I also recommend seeing a counselor regularly.         Relevant Medications     ziprasidone (GEODON) 80 MG capsule    VYVANSE 70 MG capsule    ALPRAZolam XR (XANAX XR) 1 MG 24 hr tablet    ALPRAZolam (XANAX) 1 MG tablet    Major depressive disorder, recurrent, moderate (CMS/HCC)    Overview     4/23/2021 Roula Clayton MD    Continue Geodon, lamotrigine, Xanax, and Vyvanse.  Continue regular follow-up with the psychiatrist.    I recommend seeing a counselor regularly to help with all the stress.    Try to walk some several days a week.  Getting outside and doing a little physical activity is very helpful for depression and anxiety and stress.         Relevant Medications    ziprasidone (GEODON) 80 MG capsule    VYVANSE 70 MG capsule    ALPRAZolam XR (XANAX XR) 1 MG 24 hr tablet    ALPRAZolam (XANAX) 1 MG tablet       Musculoskeletal and Injuries    Arthralgia of multiple sites (Chronic)    Overview     4/23/2021 Roula Clayton MD    2 discrete episodes of diffuse arthralgia lasting 2 to 3 hours then totally resolving.  Unknown etiology.  She will let us know if this continues to occur.         Cervicalgia    Overview     4/23/2021 Roula Clayton MD    Use a moist heat pack on the neck to relax tight muscles.  Practice good posture, especially when working.  Do a few neck stretches throughout the workday.    Keeping the neck muscles relaxed helps prevent headaches as well.            Neuro    Migraine with aura and without status migrainosus, not intractable - Primary    Overview     4/23/2021 Roula Clayton MD    Continue sumatriptan as needed for acute headache.  Sumatriptan injection prescription sent to the pharmacy.  She was advised to use the injection when she wakes up with a headache already in progress.   May take ibuprofen with sumatriptan at the onset of the headache.  Continue trying to get a good night sleep.    I have encouraged her to follow-up with Dr. Oneil and try Botox or one of the new injectable medications for prevention.             Relevant Medications     lamoTRIgine (LaMICtal) 150 MG tablet    SUMAtriptan (IMITREX) 100 MG tablet    naproxen (Naprosyn) 500 MG tablet    cyclobenzaprine (FLEXERIL) 10 MG tablet    SUMAtriptan (IMITREX) 6 MG/0.5ML injection       Sleep    Obstructive sleep apnea    Overview     4/23/2021 Roula Clayton MD    Sleep apnea has probably improved with weight loss.  Work on maintaining your weight loss.            Symptoms and Signs    Chest pain, atypical (Chronic)    Overview     4/23/2021 Roula Clayton MD    EKG today is normal except for a rapid heart rate at 104.      Her chest pain episodes have not been consistent with heart related pain.  I believe they are due to anxiety and stress and acid reflux.            Other    Overweight with body mass index (BMI) of 26 to 26.9 in adult (Chronic)    Overview     4/23/2021 Roula Clayton MD  Continue healthy low-fat diet.  Walking and being more physically activity improves mental as well as physical health.           Other Visit Diagnoses     Annual physical exam        Relevant Orders    CBC & Differential    Comprehensive Metabolic Panel    Ferritin    Iron Profile    Lipid Panel    T3, Free    T4, Free    TSH    Urinalysis without microscopic (no culture) - Urine, Clean Catch    Vitamin B12    Vitamin D 25 Hydroxy             Diagnosis Plan   1. Migraine with aura and without status migrainosus, not intractable     2. Major depressive disorder, recurrent, moderate (CMS/HCC)     3. Generalized anxiety disorder     4. Chest pain, atypical     5. Arthralgia of multiple sites     6. Yeast vaginitis  fluconazole (DIFLUCAN) 150 MG tablet   7. Sinus congestion     8. Overweight with body mass index (BMI) of 26 to 26.9 in adult     9. Goiter, nontoxic, multinodular     10. Cervicalgia     11. B12 deficiency     12. Iron deficiency     13. Obstructive sleep apnea     14. Annual physical exam  CBC & Differential    Comprehensive Metabolic Panel    Ferritin    Iron Profile    Lipid Panel     T3, Free    T4, Free    TSH    Urinalysis without microscopic (no culture) - Urine, Clean Catch    Vitamin B12    Vitamin D 25 Hydroxy       I spent 52 minutes caring for Mildred on this date of service. This time includes time spent by me in the following activities:preparing for the visit, performing a medically appropriate examination and/or evaluation , counseling and educating the patient/family/caregiver, ordering medications, tests, or procedures and documenting information in the medical record    Plan of care reviewed with patient at the conclusion of today's visit. Education was provided regarding diagnosis, management, and any prescribed or recommended OTC medications.Patient verbalizes understanding of and agreement with management plan.         Roula Clayton MD        Answers for HPI/ROS submitted by the patient on 4/19/2021  Please describe your symptoms.: Worsening migraines, severe body aches over the last few weeks that last approx 2-3  hours each time  and then completely go away and then come back periodically. Severe chest pain 2x thst resolved within an hour. Intermitent nausea and dizziness  Have you had these symptoms before?: No  How long have you been having these symptoms?: Greater than 2 weeks  Please list any medications you are currently taking for this condition.: Ibuprofen, phenegren  Please describe any probable cause for these symptoms. : ??  What is the primary reason for your visit?: Other

## 2021-04-23 NOTE — PATIENT INSTRUCTIONS
Patient Instructions   Problem List Items Addressed This Visit        ENT    Sinus congestion (Chronic)    Overview     4/23/2021 Roula Clayton MD    Symptoms have been improving after steroid injection and using Flonase nasal spray.  She will continue Flonase every evening.  Also recommend taking Mucinex as needed.            Endocrine and Metabolic    Goiter, nontoxic, multinodular    Overview     4/23/2021 Roula Clayton MD    Goiter is unchanged on exam.  She is euthyroid.  We will continue to check thyroid labs.         B12 deficiency    Overview     4/23/2021 Roula Clayton MD    Recheck blood level.            Genitourinary and Reproductive     Yeast vaginitis    Overview     4/23/2021 Roula Clayton MD    Take fluconazole tablet, 1 a day as needed.         Relevant Medications    fluconazole (DIFLUCAN) 150 MG tablet       Hematology and Neoplasia    Iron deficiency    Overview     4/23/2021 Roula Clayton MD      Recheck iron levels.            Mental Health    Generalized anxiety disorder (Chronic)    Overview     4/23/2021 Roula Clayton MD    Anxiety has been worse due to very high stress level over the last couple of years.    Continue current medications and regular follow-up with the psychiatrist.    I also recommend seeing a counselor regularly.         Relevant Medications    ziprasidone (GEODON) 80 MG capsule    VYVANSE 70 MG capsule    ALPRAZolam XR (XANAX XR) 1 MG 24 hr tablet    ALPRAZolam (XANAX) 1 MG tablet    Major depressive disorder, recurrent, moderate (CMS/HCC)    Overview     4/23/2021 Roula Clayton MD    Continue Geodon, lamotrigine, Xanax, and Vyvanse.  Continue regular follow-up with the psychiatrist.    I recommend seeing a counselor regularly to help with all the stress.    Try to walk some several days a week.  Getting outside and doing a little physical activity is very helpful for depression and anxiety and stress.         Relevant Medications    ziprasidone  (GEODON) 80 MG capsule    VYVANSE 70 MG capsule    ALPRAZolam XR (XANAX XR) 1 MG 24 hr tablet    ALPRAZolam (XANAX) 1 MG tablet       Musculoskeletal and Injuries    Arthralgia of multiple sites (Chronic)    Overview     4/23/2021 Roula Clayton MD    2 discrete episodes of diffuse arthralgia lasting 2 to 3 hours then totally resolving.  Unknown etiology.  She will let us know if this continues to occur.         Cervicalgia    Overview     4/23/2021 Roula Clayton MD    Use a moist heat pack on the neck to relax tight muscles.  Practice good posture, especially when working.  Do a few neck stretches throughout the workday.    Keeping the neck muscles relaxed helps prevent headaches as well.            Neuro    Migraine with aura and without status migrainosus, not intractable - Primary    Overview     4/23/2021 Roula Clayton MD    Continue sumatriptan as needed for acute headache.  Sumatriptan injection prescription sent to the pharmacy.  She was advised to use the injection when she wakes up with a headache already in progress.   May take ibuprofen with sumatriptan at the onset of the headache.  Continue trying to get a good night sleep.    I have encouraged her to follow-up with Dr. Oneil and try Botox or one of the new injectable medications for prevention.             Relevant Medications    lamoTRIgine (LaMICtal) 150 MG tablet    SUMAtriptan (IMITREX) 100 MG tablet    naproxen (Naprosyn) 500 MG tablet    cyclobenzaprine (FLEXERIL) 10 MG tablet    SUMAtriptan (IMITREX) 6 MG/0.5ML injection       Sleep    Obstructive sleep apnea    Overview     4/23/2021 Roula Clayton MD    Sleep apnea has probably improved with weight loss.  Work on maintaining your weight loss.            Symptoms and Signs    Chest pain, atypical (Chronic)    Overview     4/23/2021 Roula Clayton MD    EKG today is normal except for a rapid heart rate at 104.      Her chest pain episodes have not been consistent with heart  related pain.  I believe they are due to anxiety and stress and acid reflux.            Other    Overweight with body mass index (BMI) of 26 to 26.9 in adult (Chronic)    Overview     4/23/2021 Roula Clayton MD  Continue healthy low-fat diet.  Walking and being more physically activity improves mental as well as physical health.           Other Visit Diagnoses     Annual physical exam        Relevant Orders    CBC & Differential    Comprehensive Metabolic Panel    Ferritin    Iron Profile    Lipid Panel    T3, Free    T4, Free    TSH    Urinalysis without microscopic (no culture) - Urine, Clean Catch    Vitamin B12    Vitamin D 25 Hydroxy           Mindfulness-Based Stress Reduction  Mindfulness-based stress reduction (MBSR) is a program that helps people learn to practice mindfulness. Mindfulness is the practice of intentionally paying attention to the present moment. It can be learned and practiced through techniques such as education, breathing exercises, meditation, and yoga. MBSR includes several mindfulness techniques in one program.  MBSR works best when you understand the treatment, are willing to try new things, and can commit to spending time practicing what you learn. MBSR training may include learning about:  · How your emotions, thoughts, and reactions affect your body.  · New ways to respond to things that cause negative thoughts to start (triggers).  · How to notice your thoughts and let go of them.  · Practicing awareness of everyday things that you normally do without thinking.  · The techniques and goals of different types of meditation.  What are the benefits of MBSR?  MBSR can have many benefits, which include helping you to:  · Develop self-awareness. This refers to knowing and understanding yourself.  · Learn skills and attitudes that help you to participate in your own health care.  · Learn new ways to care for yourself.  · Be more accepting about how things are, and let things go.  · Be  less judgmental and approach things with an open mind.  · Be patient with yourself and trust yourself more.  MBSR has also been shown to:  · Reduce negative emotions, such as depression and anxiety.  · Improve memory and focus.  · Change how you sense and approach pain.  · Boost your body's ability to fight infections.  · Help you connect better with other people.  · Improve your sense of well-being.  Follow these instructions at home:    · Find a local in-person or online MBSR program.  · Set aside some time regularly for mindfulness practice.  · Find a mindfulness practice that works best for you. This may include one or more of the following:  ? Meditation. Meditation involves focusing your mind on a certain thought or activity.  ? Breathing awareness exercises. These help you to stay present by focusing on your breath.  ? Body scan. For this practice, you lie down and pay attention to each part of your body from head to toe. You can identify tension and soreness and intentionally relax parts of your body.  ? Yoga. Yoga involves stretching and breathing, and it can improve your ability to move and be flexible. It can also provide an experience of testing your body's limits, which can help you release stress.  ? Mindful eating. This way of eating involves focusing on the taste, texture, color, and smell of each bite of food. Because this slows down eating and helps you feel full sooner, it can be an important part of a weight-loss plan.  · Find a podcast or recording that provides guidance for breathing awareness, body scan, or meditation exercises. You can listen to these any time when you have a free moment to rest without distractions.  · Follow your treatment plan as told by your health care provider. This may include taking regular medicines and making changes to your diet or lifestyle as recommended.  How to practice mindfulness  To do a basic awareness exercise:  · Find a comfortable place to sit.  · Pay  attention to the present moment. Observe your thoughts, feelings, and surroundings just as they are.  · Avoid placing judgment on yourself, your feelings, or your surroundings. Make note of any judgment that comes up, and let it go.  · Your mind may wander, and that is okay. Make note of when your thoughts drift, and return your attention to the present moment.  To do basic mindfulness meditation:  · Find a comfortable place to sit. This may include a stable chair or a firm floor cushion.  ? Sit upright with your back straight. Let your arms fall next to your side with your hands resting on your legs.  ? If sitting in a chair, rest your feet flat on the floor.  ? If sitting on a cushion, cross your legs in front of you.  · Keep your head in a neutral position with your chin dropped slightly. Relax your jaw and rest the tip of your tongue on the roof of your mouth. Drop your gaze to the floor. You can close your eyes if you like.  · Breathe normally and pay attention to your breath. Feel the air moving in and out of your nose. Feel your belly expanding and relaxing with each breath.  · Your mind may wander, and that is okay. Make note of when your thoughts drift, and return your attention to your breath.  · Avoid placing judgment on yourself, your feelings, or your surroundings. Make note of any judgment or feelings that come up, let them go, and bring your attention back to your breath.  · When you are ready, lift your gaze or open your eyes. Pay attention to how your body feels after the meditation.  Where to find more information  You can find more information about MBSR from:  · Your health care provider.  · Community-based meditation centers or programs.  · Programs offered near you.  Summary  · Mindfulness-based stress reduction (MBSR) is a program that teaches you how to intentionally pay attention to the present moment. It is used with other treatments to help you cope better with daily stress, emotions, and  pain.  · MBSR focuses on developing self-awareness, which allows you to respond to life stress without judgment or negative emotions.  · MBSR programs may involve learning different mindfulness practices, such as breathing exercises, meditation, yoga, body scan, or mindful eating. Find a mindfulness practice that works best for you, and set aside time for it on a regular basis.  This information is not intended to replace advice given to you by your health care provider. Make sure you discuss any questions you have with your health care provider.  Document Revised: 11/30/2018 Document Reviewed: 04/26/2018  [employer] Patient Education © 2021 [employer]    Managing Stress, Adult  Feeling a certain amount of stress is normal. Stress helps our body and mind get ready to deal with the demands of life. Stress hormones can motivate you to do well at work and meet your responsibilities. However severe or long-lasting (chronic) stress can affect your mental and physical health. Chronic stress puts you at higher risk for anxiety, depression, and other health problems like digestive problems, muscle aches, heart disease, high blood pressure, and stroke.  What are the causes?  Common causes of stress include:  · Demands from work, such as deadlines, feeling overworked, or having long hours.  · Pressures at home, such as money issues, disagreements with a spouse, or parenting issues.  · Pressures from major life changes, such as divorce, moving, loss of a loved one, or chronic illness.  You may be at higher risk for stress-related problems if you do not get enough sleep, are in poor health, do not have emotional support, or have a mental health disorder like anxiety or depression.  How to recognize stress  Stress can make you:  · Have trouble sleeping.  · Feel sad, anxious, irritable, or overwhelmed.  · Lose your appetite.  · Overeat or want to eat unhealthy foods.  · Want to use drugs or alcohol.  Stress can also cause  physical symptoms, such as:  · Sore, tense muscles, especially in the shoulders and neck.  · Headaches.  · Trouble breathing.  · A faster heart rate.  · Stomach pain, nausea, or vomiting.  · Diarrhea or constipation.  · Trouble concentrating.  Follow these instructions at home:  Lifestyle  · Identify the source of your stress and your reaction to it. See a therapist who can help you change your reactions.  · When there are stressful events:  ? Talk about it with family, friends, or co-workers.  ? Try to think realistically about stressful events and not ignore them or overreact.  ? Try to find the positives in a stressful situation and not focus on the negatives.  ? Cut back on responsibilities at work and home, if possible. Ask for help from friends or family members if you need it.  · Find ways to cope with stress, such as:  ? Meditation.  ? Deep breathing.  ? Yoga or akosua chi.  ? Progressive muscle relaxation.  ? Doing art, playing music, or reading.  ? Making time for fun activities.  ? Spending time with family and friends.  · Get support from family, friends, or spiritual resources.  Eating and drinking  · Eat a healthy diet. This includes:  ? Eating foods that are high in fiber, such as beans, whole grains, and fresh fruits and vegetables.  ? Limiting foods that are high in fat and processed sugars, such as fried and sweet foods.  · Do not skip meals or overeat.  · Drink enough fluid to keep your urine pale yellow.  Alcohol use  · Do not drink alcohol if:  ? Your health care provider tells you not to drink.  ? You are pregnant, may be pregnant, or are planning to become pregnant.  · Drinking alcohol is a way some people try to ease their stress. This can be dangerous, so if you drink alcohol:  ? Limit how much you use to:  § 0-1 drink a day for women.  § 0-2 drinks a day for men.  ? Be aware of how much alcohol is in your drink. In the [place] one drink equals one 12 oz bottle of beer (355 mL), one 5 oz glass of  wine (148 mL), or one 1½ oz glass of hard liquor (44 mL).  Activity    · Include 30 minutes of exercise in your daily schedule. Exercise is a good stress reducer.  · Include time in your day for an activity that you find relaxing. Try taking a walk, going on a bike ride, reading a book, or listening to music.  · Schedule your time in a way that lowers stress, and keep a consistent schedule. Prioritize what is most important to get done.  General instructions  · Get enough sleep. Try to go to sleep and get up at about the same time every day.  · Take over-the-counter and prescription medicines only as told by your health care provider.  · Do not use any products that contain nicotine or tobacco, such as cigarettes, e-cigarettes, and chewing tobacco. If you need help quitting, ask your health care provider.  · Do not use drugs or smoke to cope with stress.  · Keep all follow-up visits as told by your health care provider. This is important.  Where to find support  · Talk with your health care provider about stress management or finding a support group.  · Find a therapist to work with you on your stress management techniques.  Contact a health care provider if:  · Your stress symptoms get worse.  · You are unable to manage your stress at home.  · You are struggling to stop using drugs or alcohol.  Get help right away if:  · You may be a danger to yourself or others.  · You have any thoughts of death or suicide.  If you ever feel like you may hurt yourself or others, or have thoughts about taking your own life, get help right away. You can go to your nearest emergency department or call:  · Your local emergency services (911 in the [place]  · A suicide crisis helpline, such as the National Suicide Prevention Lifeline at 1-342.496.6403. This is open 24 hours a day.  Summary  · Feeling a certain amount of stress is normal, but severe or long-lasting (chronic) stress can affect your mental and physical health.  · Chronic  stress can put you at higher risk for anxiety, depression, and other health problems like digestive problems, muscle aches, heart disease, high blood pressure, and stroke.  · You may be at higher risk for stress-related problems if you do not get enough sleep, are in poor health, lack emotional support, or have a mental health disorder like anxiety or depression.  · Identify the source of your stress and your reaction to it. Try talking about stressful events with family, friends, or co-workers, finding a coping method, or getting support from spiritual resources.  · If you need more help, talk with your health care provider about finding a support group or a mental health therapist.  This information is not intended to replace advice given to you by your health care provider. Make sure you discuss any questions you have with your health care provider.  Document Revised: 07/15/2020 Document Reviewed: 07/15/2020  [employer] Patient Education © 2021 [employer]

## 2021-04-27 DIAGNOSIS — E78.00 HYPERCHOLESTEROLEMIA: Primary | ICD-10-CM

## 2021-04-27 RX ORDER — IRON,CARB/VIT C/VIT B12/FOLIC 100-250-1
1 TABLET ORAL DAILY
Qty: 90 EACH | Refills: 3 | Status: SHIPPED | OUTPATIENT
Start: 2021-04-27 | End: 2021-06-11

## 2021-04-27 RX ORDER — PRAVASTATIN SODIUM 20 MG
20 TABLET ORAL NIGHTLY
Qty: 90 TABLET | Refills: 1 | Status: SHIPPED | OUTPATIENT
Start: 2021-04-27 | End: 2021-10-20

## 2021-05-11 RX ORDER — FLUTICASONE PROPIONATE 50 MCG
1 SPRAY, SUSPENSION (ML) NASAL NIGHTLY
Qty: 18.2 ML | Refills: 5 | Status: SHIPPED | OUTPATIENT
Start: 2021-05-11 | End: 2021-10-28

## 2021-06-11 ENCOUNTER — LAB (OUTPATIENT)
Dept: LAB | Facility: HOSPITAL | Age: 51
End: 2021-06-11

## 2021-06-11 ENCOUNTER — OFFICE VISIT (OUTPATIENT)
Dept: INTERNAL MEDICINE | Facility: CLINIC | Age: 51
End: 2021-06-11

## 2021-06-11 VITALS
OXYGEN SATURATION: 98 % | BODY MASS INDEX: 25.57 KG/M2 | SYSTOLIC BLOOD PRESSURE: 104 MMHG | DIASTOLIC BLOOD PRESSURE: 82 MMHG | HEIGHT: 64 IN | HEART RATE: 117 BPM | TEMPERATURE: 97.8 F | WEIGHT: 149.8 LBS

## 2021-06-11 DIAGNOSIS — F33.1 MAJOR DEPRESSIVE DISORDER, RECURRENT, MODERATE (HCC): ICD-10-CM

## 2021-06-11 DIAGNOSIS — E61.1 IRON DEFICIENCY: ICD-10-CM

## 2021-06-11 DIAGNOSIS — F41.1 GENERALIZED ANXIETY DISORDER: Chronic | ICD-10-CM

## 2021-06-11 DIAGNOSIS — M54.2 CERVICALGIA: ICD-10-CM

## 2021-06-11 DIAGNOSIS — K59.01 SLOW TRANSIT CONSTIPATION: ICD-10-CM

## 2021-06-11 DIAGNOSIS — E04.2 GOITER, NONTOXIC, MULTINODULAR: ICD-10-CM

## 2021-06-11 DIAGNOSIS — E78.00 HYPERCHOLESTEROLEMIA: ICD-10-CM

## 2021-06-11 DIAGNOSIS — E55.9 VITAMIN D DEFICIENCY: ICD-10-CM

## 2021-06-11 DIAGNOSIS — Z00.00 ANNUAL PHYSICAL EXAM: Primary | ICD-10-CM

## 2021-06-11 LAB
ALBUMIN SERPL-MCNC: 4.6 G/DL (ref 3.5–5.2)
ALBUMIN/GLOB SERPL: 1.8 G/DL
ALP SERPL-CCNC: 58 U/L (ref 39–117)
ALT SERPL W P-5'-P-CCNC: 9 U/L (ref 1–33)
ANION GAP SERPL CALCULATED.3IONS-SCNC: 10.5 MMOL/L (ref 5–15)
AST SERPL-CCNC: 13 U/L (ref 1–32)
BILIRUB SERPL-MCNC: 0.3 MG/DL (ref 0–1.2)
BUN SERPL-MCNC: 13 MG/DL (ref 6–20)
BUN/CREAT SERPL: 16.7 (ref 7–25)
CALCIUM SPEC-SCNC: 9.3 MG/DL (ref 8.6–10.5)
CHLORIDE SERPL-SCNC: 102 MMOL/L (ref 98–107)
CHOLEST SERPL-MCNC: 203 MG/DL (ref 0–200)
CO2 SERPL-SCNC: 25.5 MMOL/L (ref 22–29)
CREAT SERPL-MCNC: 0.78 MG/DL (ref 0.57–1)
GFR SERPL CREATININE-BSD FRML MDRD: 78 ML/MIN/1.73
GLOBULIN UR ELPH-MCNC: 2.6 GM/DL
GLUCOSE SERPL-MCNC: 80 MG/DL (ref 65–99)
HDLC SERPL-MCNC: 50 MG/DL (ref 40–60)
LDLC SERPL CALC-MCNC: 137 MG/DL (ref 0–100)
LDLC/HDLC SERPL: 2.7 {RATIO}
POTASSIUM SERPL-SCNC: 3.8 MMOL/L (ref 3.5–5.2)
PROT SERPL-MCNC: 7.2 G/DL (ref 6–8.5)
SODIUM SERPL-SCNC: 138 MMOL/L (ref 136–145)
TRIGL SERPL-MCNC: 89 MG/DL (ref 0–150)
VLDLC SERPL-MCNC: 16 MG/DL (ref 5–40)

## 2021-06-11 PROCEDURE — 80053 COMPREHEN METABOLIC PANEL: CPT

## 2021-06-11 PROCEDURE — 80061 LIPID PANEL: CPT

## 2021-06-11 PROCEDURE — 99396 PREV VISIT EST AGE 40-64: CPT | Performed by: INTERNAL MEDICINE

## 2021-06-11 RX ORDER — PLECANATIDE 3 MG/1
1 TABLET ORAL DAILY
Qty: 30 TABLET | Refills: 5 | Status: SHIPPED | OUTPATIENT
Start: 2021-06-11 | End: 2022-04-08

## 2021-06-11 RX ORDER — MULTIVIT-MIN/IRON/FOLIC ACID/K 18-600-40
CAPSULE ORAL
COMMUNITY
End: 2022-04-10

## 2021-06-11 NOTE — PROGRESS NOTES
[facility]     Annual preventive exam    Mildred French  1970   [id number]      Patient Care Team:  Roula Clayton MD as PCP - General (Internal Medicine)  Reinaldo Oneil MD as Consulting Physician (Neurology)    Chief Complaint   Patient presents with   • Annual Exam   • Stress     she has been under a lot of stress and she thinks it is causing her to have some memory issues            HPI  Patient is a 51 y.o. female presents with worried about memory. Has to look back at notes now at work as  instead of knowing answers immediately. Lots of stress. Her boss is very understanding and has encouraged her to take a vacation which she hasn't done in over 2 years.  She will be going to the beach next week.   She has started making wreaths which helps relieve stress some  and brings her carlota.   Worried about her son who witnessed sudden death of her other son 2 years ago and feels responsible. He dropped out of school and and won't go to counselor. Her extended family is in conflict and not speaking to each other.   Seeing psychiatrist and taking her meds regularly. She feels they do help some. .       CHRONIC CONDITIONS    Pravastatin for hyperlipidemia. Hasn't taken it every night. Losing weight by eating less carbs and more protein.  Not getting much exercise or walking.    Constipation ongoing problem.  She did not buy the MiraLAX.  She does not remember trying trulance. She does try to drink a lot of water.      Vyvanse helps a lot with ADD.      Past Medical History:   Diagnosis Date   • Anxiety    • Bipolar II disorder (CMS/[facility])    • Fibromyalgia    • GERD (gastroesophageal reflux disease)    • Goiter    • Major depressive disorder    • Migraines     4-5 times a week    • Scoliosis    • Sleep apnea        Past Surgical History:   Procedure Laterality Date   • BARIATRIC SURGERY     •  SECTION  1997   •  SECTION  1988   • TONSILLECTOMY     •  WISDOM TOOTH EXTRACTION         Family History   Problem Relation Age of Onset   • Thyroid disease Mother    • Hypertension Mother    • Hyperlipidemia Mother    • Obesity Mother    • Depression Mother    • Obesity Sister    • Depression Sister    • Thyroid disease Sister    • Thyroid disease Maternal Aunt    • Coronary artery disease Maternal Grandmother    • Thyroid disease Father    • Diabetes Other    • Sudden death Son 22        Brugada syn   • Heart defect Son 21        Positive for Brugada syndrome.  No structural heart disease.  Asymptomatic   • Breast cancer Neg Hx        Social History     Socioeconomic History   • Marital status:      Spouse name: Not on file   • Number of children: Not on file   • Years of education: Not on file   • Highest education level: Not on file   Tobacco Use   • Smoking status: Never Smoker   • Smokeless tobacco: Never Used   Substance and Sexual Activity   • Alcohol use: No   • Drug use: No       Allergies   Allergen Reactions   • Atenolol Other (See Comments)     Suicidal    • Hydrocodone Unknown (See Comments)     Patient is not aware of a reaction to this drug    • Topamax [Topiramate] Anxiety       Review of Systems:     Review of Systems   Constitutional: Negative for chills, fatigue, fever, unexpected weight gain and unexpected weight loss.   HENT: Negative for sore throat and trouble swallowing.    Eyes: Negative for visual disturbance.   Respiratory: Negative for cough, shortness of breath and wheezing.    Cardiovascular: Negative for chest pain, palpitations and leg swelling.   Gastrointestinal: Positive for constipation. Negative for abdominal pain, blood in stool and diarrhea.   Endocrine: Negative for cold intolerance and heat intolerance.   Genitourinary: Negative for dysuria, frequency and urinary incontinence.   Musculoskeletal: Positive for neck pain. Negative for back pain, gait problem and joint swelling.   Skin: Negative for rash and skin lesions.  "  Neurological: Negative for dizziness and headache.   Hematological: Negative for adenopathy. Does not bruise/bleed easily.   Psychiatric/Behavioral: Positive for sleep disturbance, depressed mood and stress. Negative for suicidal ideas. The patient is nervous/anxious.        Vital Signs  Vitals:    06/11/21 1126   BP: 104/82   BP Location: Left arm   Patient Position: Sitting   Cuff Size: Adult   Pulse: 117   Temp: 97.8 °F (36.6 °C)   TempSrc: Infrared   SpO2: 98%   Weight: 67.9 kg (149 lb 12.8 oz)   Height: 162.6 cm (64\")   PainSc: 0-No pain     Body mass index is 25.71 kg/m².  Patient's Body mass index is 25.71 kg/m². indicating that she is overweight (BMI 25-29.9). Obesity-related health conditions include the following: obstructive sleep apnea and dyslipidemias. Obesity is improving with lifestyle modifications. BMI is is above average; BMI management plan is completed. We discussed low calorie, low carb based diet program, portion control and increasing exercise..  Age appropriate preventive counseling done including age appropriate vaccines,regular  Mammogram and self breast exam, pap smear, colonoscopy, regular dental visits, mental health, injury prevention such as wearing seat belt and preventing falls, healthy  nutrition, healthy weight, regular physical exercise. Alcohol use is moderate.  Tobacco history-none. Drug use-none.  STD's-not at risk.    Patient's depression is single episode and is severe without psychosis. Their depression is currently active and the condition is improving with treatment. This will be reassessed at the next regular appointment. F/U as described:patient will continue current medication therapy.          Current Outpatient Medications:   •  ALPRAZolam (XANAX) 1 MG tablet, TAKE 1 TABLET BY MOUTH EVERY DAY ** MAY REPEAT 1 TABLET FOR ANXIETY **, Disp: , Rfl: 1  •  ALPRAZolam XR (XANAX XR) 1 MG 24 hr tablet, Take 1 mg by mouth Every Morning., Disp: , Rfl:   •  Cholecalciferol " (Vitamin D) 50 MCG (2000 UT) capsule, Take  by mouth., Disp: , Rfl:   •  cyclobenzaprine (FLEXERIL) 10 MG tablet, Take 1 tablet by mouth every night at bedtime., Disp: 30 tablet, Rfl: 2  •  famotidine (PEPCID) 40 MG tablet, TAKE 1 TABLET BY MOUTH TWICE A DAY, Disp: 180 tablet, Rfl: 1  •  fluconazole (DIFLUCAN) 150 MG tablet, Take 1 tablet by mouth Daily As Needed (yeast vaginitis)., Disp: 3 tablet, Rfl: 0  •  fluticasone (Flonase) 50 MCG/ACT nasal spray, 1 spray into the nostril(s) as directed by provider Every Night., Disp: 18.2 mL, Rfl: 5  •  lamoTRIgine (LaMICtal) 150 MG tablet, Take 300 mg by mouth Every Morning., Disp: , Rfl: 1  •  naproxen (Naprosyn) 500 MG tablet, Take 1 tablet by mouth 2 (Two) Times a Day As Needed for Headache., Disp: 60 tablet, Rfl: 5  •  pravastatin (PRAVACHOL) 20 MG tablet, Take 1 tablet by mouth Every Night., Disp: 90 tablet, Rfl: 1  •  promethazine (PHENERGAN) 25 MG tablet, Take 1 tablet by mouth Every 8 (Eight) Hours As Needed for Nausea or Vomiting., Disp: 12 tablet, Rfl: 3  •  SUMAtriptan (IMITREX) 100 MG tablet, Take one tablet at onset of headache. May repeat dose one time in 2 hours if headache not relieved., Disp: 18 tablet, Rfl: 8  •  VYVANSE 70 MG capsule, Take 70 mg by mouth Every Morning  , Disp: , Rfl:   •  ziprasidone (GEODON) 80 MG capsule, Take 80 mg by mouth 2 (Two) Times a Day., Disp: , Rfl: 1  •  Plecanatide (Trulance) 3 MG tablet, Take 1 tablet by mouth Daily., Disp: 30 tablet, Rfl: 5  •  polyethylene glycol (GlycoLax) 17 GM/SCOOP powder, Take 17 g by mouth Daily., Disp: 850 g, Rfl: 5  •  SUMAtriptan (IMITREX) 6 MG/0.5ML injection, Inject prescribed dose at onset of headache. May repeat dose one time in 1 hour(s) if headache not relieved., Disp: 5 mL, Rfl: 5    Physical Exam:    Physical Exam  Vitals and nursing note reviewed.   Constitutional:       Appearance: She is well-developed and overweight.   HENT:      Head: Normocephalic.   Eyes:      Conjunctiva/sclera:  Conjunctivae normal.      Pupils: Pupils are equal, round, and reactive to light.   Neck:      Thyroid: No thyromegaly.   Cardiovascular:      Rate and Rhythm: Normal rate and regular rhythm.      Heart sounds: Normal heart sounds.   Pulmonary:      Effort: Pulmonary effort is normal.      Breath sounds: Normal breath sounds. No wheezing.   Abdominal:      General: Bowel sounds are normal.      Palpations: Abdomen is soft.      Tenderness: There is no abdominal tenderness.   Musculoskeletal:         General: No tenderness. Normal range of motion.      Cervical back: Normal range of motion and neck supple.   Lymphadenopathy:      Cervical: No cervical adenopathy.   Skin:     General: Skin is warm and dry.      Findings: No rash.   Neurological:      Mental Status: She is alert and oriented to person, place, and time.      Cranial Nerves: No cranial nerve deficit.      Sensory: No sensory deficit.      Coordination: Coordination normal.      Gait: Gait normal.   Psychiatric:         Attention and Perception: Attention normal.         Mood and Affect: Mood and affect normal.         Speech: Speech normal.         Behavior: Behavior normal.         Thought Content: Thought content normal.         Cognition and Memory: Cognition normal.         Judgment: Judgment normal.          ACE III MINI        Results Review:    None    CMP:  Lab Results   Component Value Date    BUN 18 04/23/2021    CREATININE 0.82 04/23/2021    EGFRIFNONA 74 04/23/2021    BCR 22.0 04/23/2021     04/23/2021    K 3.9 04/23/2021    CO2 23.9 04/23/2021    CALCIUM 9.3 04/23/2021    ALBUMIN 4.30 04/23/2021    BILITOT 0.3 04/23/2021    ALKPHOS 56 04/23/2021    AST 16 04/23/2021    ALT 11 04/23/2021     HbA1c:  No results found for: HGBA1C  Microalbumin:  No results found for: MICROALBUR, POCMALB, POCCREAT  Lipid Panel  Lab Results   Component Value Date    CHOL 227 (H) 04/23/2021    TRIG 102 04/23/2021    HDL 51 04/23/2021     (H)  04/23/2021    AST 16 04/23/2021    ALT 11 04/23/2021       Medication Review: Medications reviewed and noted  Patient Instructions   Problem List Items Addressed This Visit        Cardiac and Vasculature    Hypercholesterolemia    Overview     6/11/2021 Roula Clayton MD    Continue to improve diet and get more exercise.         Relevant Medications    pravastatin (PRAVACHOL) 20 MG tablet       Endocrine and Metabolic    Vitamin D deficiency    Overview     6/11/2021 Roula Clayton MD    Continue current dose of vitamin D3 daily.         Goiter, nontoxic, multinodular    Overview     6/11/2021 Roula Clayton MD    Goiter is unchanged on exam.  She is euthyroid.  Thyroid labs in April were acceptable.            Gastrointestinal Abdominal     Slow transit constipation    Overview     6/5/2020 Roula Clayton MD    Try a whole capful of miralax daily in hot coffee or tea every morning.    If not working, start trulance tablet daily.  Samples given today.    Drink lots of water every day.    New medication added today. Benefits and possible side effects discussed. Patient verbalized understanding.              Health Encounters    Annual physical exam - Primary    Overview     6/11/2021 Roula Clayton MD    She did have a colonoscopy about 6 years ago.  She will get the documentation and send it to us.    She is up-to-date on mammograms and Pap smears.    She is up-to-date on vaccinations except for getting the 2nd hepatitis A vaccine.            Hematology and Neoplasia    Iron deficiency    Overview     6/11/2021 Roula Clayton MD    Continue taking 2 of the [place] vitamins with iron daily.            Mental Health    Generalized anxiety disorder (Chronic)    Overview     6/11/2021 Roula Clayton MD    Her memory issues are related to high stress and being pulled in so many different directions and worrying about her family.  We discussed having a relaxation time every day and doing things  that are enjoyable like making her wreaths.    Continue current medications and regular follow-up with the psychiatrist.    I also recommend seeing a counselor regularly.         Relevant Medications    ziprasidone (GEODON) 80 MG capsule    VYVANSE 70 MG capsule    ALPRAZolam XR (XANAX XR) 1 MG 24 hr tablet    ALPRAZolam (XANAX) 1 MG tablet    Major depressive disorder, recurrent, moderate (CMS/HCC)    Overview     6/11/2021 Roula Clayton MD    Continue Geodon, lamotrigine, Xanax, and Vyvanse.  Continue regular follow-up with the psychiatrist.    I recommend seeing a counselor regularly to help with all the stress.    Continue making wreaths. Try to walk some several days a week.  Getting outside and doing a little physical activity is very helpful for depression and anxiety and stress.         Relevant Medications    ziprasidone (GEODON) 80 MG capsule    VYVANSE 70 MG capsule    ALPRAZolam XR (XANAX XR) 1 MG 24 hr tablet    ALPRAZolam (XANAX) 1 MG tablet       Musculoskeletal and Injuries    Cervicalgia    Overview     6/11/2021 Roula Clayton MD    Use a moist heat pack on the neck to relax tight muscles.  Practice good posture, especially when working.  Do a few neck stretches throughout the workday.    Keeping the neck muscles relaxed helps prevent headaches as well.    Take cyclobenzaprine as needed for tight muscles.                  Diagnosis Plan   1. Annual physical exam     2. Hypercholesterolemia     3. Generalized anxiety disorder     4. Major depressive disorder, recurrent, moderate (CMS/HCC)     5. Slow transit constipation     6. Iron deficiency     7. Goiter, nontoxic, multinodular     8. Cervicalgia     9. Vitamin D deficiency             Plan of care reviewed with patient at the conclusion of today's visit. Education was provided regarding diagnosis, management, and any prescribed or recommended OTC medications.Patient verbalizes understanding of and agreement with management plan.          Roula Clayton MD

## 2021-06-11 NOTE — PATIENT INSTRUCTIONS
Patient Instructions   Problem List Items Addressed This Visit        Cardiac and Vasculature    Hypercholesterolemia    Overview     6/11/2021 Roula Clayton MD    Continue to improve diet and get more exercise.         Relevant Medications    pravastatin (PRAVACHOL) 20 MG tablet       Endocrine and Metabolic    Vitamin D deficiency    Overview     6/11/2021 Roula Clayton MD    Continue current dose of vitamin D3 daily.         Goiter, nontoxic, multinodular    Overview     6/11/2021 Roula Clayton MD    Goiter is unchanged on exam.  She is euthyroid.  Thyroid labs in April were acceptable.            Gastrointestinal Abdominal     Slow transit constipation    Overview     6/5/2020 Roula Clayton MD    Try a whole capful of miralax daily in hot coffee or tea every morning.    If not working, start trulance tablet daily.  Samples given today.    Drink lots of water every day.    New medication added today. Benefits and possible side effects discussed. Patient verbalized understanding.              Health Encounters    Annual physical exam - Primary    Overview     6/11/2021 Roula Clayton MD    She did have a colonoscopy about 6 years ago.  She will get the documentation and send it to us.    She is up-to-date on mammograms and Pap smears.    She is up-to-date on vaccinations except for getting the 2nd hepatitis A vaccine.            Hematology and Neoplasia    Iron deficiency    Overview     6/11/2021 Roula Clayton MD    Continue taking 2 of the [place] vitamins with iron daily.            Mental Health    Generalized anxiety disorder (Chronic)    Overview     6/11/2021 Roula Clayton MD    Her memory issues are related to high stress and being pulled in so many different directions and worrying about her family.  We discussed having a relaxation time every day and doing things that are enjoyable like making her wreaths.    Continue current medications and regular follow-up with the  psychiatrist.    I also recommend seeing a counselor regularly.         Relevant Medications    ziprasidone (GEODON) 80 MG capsule    VYVANSE 70 MG capsule    ALPRAZolam XR (XANAX XR) 1 MG 24 hr tablet    ALPRAZolam (XANAX) 1 MG tablet    Major depressive disorder, recurrent, moderate (CMS/HCC)    Overview     6/11/2021 Roula Clayton MD    Continue Geodon, lamotrigine, Xanax, and Vyvanse.  Continue regular follow-up with the psychiatrist.    I recommend seeing a counselor regularly to help with all the stress.    Continue making wreaths. Try to walk some several days a week.  Getting outside and doing a little physical activity is very helpful for depression and anxiety and stress.         Relevant Medications    ziprasidone (GEODON) 80 MG capsule    VYVANSE 70 MG capsule    ALPRAZolam XR (XANAX XR) 1 MG 24 hr tablet    ALPRAZolam (XANAX) 1 MG tablet       Musculoskeletal and Injuries    Cervicalgia    Overview     6/11/2021 Roula Clayton MD    Use a moist heat pack on the neck to relax tight muscles.  Practice good posture, especially when working.  Do a few neck stretches throughout the workday.    Keeping the neck muscles relaxed helps prevent headaches as well.    Take cyclobenzaprine as needed for tight muscles.                  BMI for Adults  What is BMI?  Body mass index (BMI) is a number that is calculated from a person's weight and height. BMI can help estimate how much of a person's weight is composed of fat. BMI does not measure body fat directly. Rather, it is an alternative to procedures that directly measure body fat, which can be difficult and expensive.  BMI can help identify people who may be at higher risk for certain medical problems.  What are BMI measurements used for?  BMI is used as a screening tool to identify possible weight problems. It helps determine whether a person is obese, overweight, a healthy weight, or underweight.  BMI is useful for:  · Identifying a weight problem that  "may be related to a medical condition or may increase the risk for medical problems.  · Promoting changes, such as changes in diet and exercise, to help reach a healthy weight. BMI screening can be repeated to see if these changes are working.  How is BMI calculated?  BMI involves measuring your weight in relation to your height. Both height and weight are measured, and the BMI is calculated from those numbers. This can be done either in English ([place] or metric measurements. Note that charts and online BMI calculators are available to help you find your BMI quickly and easily without having to do these calculations yourself.  To calculate your BMI in English ([place] measurements:    1. Measure your weight in pounds (lb).  2. Multiply the number of pounds by 703.  ? For example, for a person who weighs 180 lb, multiply that number by 703, which equals 126,540.  3. Measure your height in inches. Then multiply that number by itself to get a measurement called \"inches squared.\"  ? For example, for a person who is 70 inches tall, the \"inches squared\" measurement is 70 inches x 70 inches, which equals 4,900 inches squared.  4. Divide the total from step 2 (number of lb x 703) by the total from step 3 (inches squared): 126,540 ÷ 4,900 = 25.8. This is your BMI.  To calculate your BMI in metric measurements:  1. Measure your weight in kilograms (kg).  2. Measure your height in meters (m). Then multiply that number by itself to get a measurement called \"meters squared.\"  ? For example, for a person who is 1.75 m tall, the \"meters squared\" measurement is 1.75 m x 1.75 m, which is equal to 3.1 meters squared.  3. Divide the number of kilograms (your weight) by the meters squared number. In this example: 70 ÷ 3.1 = 22.6. This is your BMI.  What do the results mean?  BMI charts are used to identify whether you are underweight, normal weight, overweight, or obese. The following guidelines will be used:  · Underweight: BMI less " than 18.5.  · Normal weight: BMI between 18.5 and 24.9.  · Overweight: BMI between 25 and 29.9.  · Obese: BMI of 30 or above.  Keep these notes in mind:  · Weight includes both fat and muscle, so someone with a muscular build, such as an athlete, may have a BMI that is higher than 24.9. In cases like these, BMI is not an accurate measure of body fat.  · To determine if excess body fat is the cause of a BMI of 25 or higher, further assessments may need to be done by a health care provider.  · BMI is usually interpreted in the same way for men and women.  Where to find more information  For more information about BMI, including tools to quickly calculate your BMI, go to these websites:  · Centers for Disease Control and Prevention: www.cdc.gov  · American Heart Association: www.heart.org  · National Heart, Lung, [employer] [place]: www.nhlbi.nih.gov  Summary  · Body mass index (BMI) is a number that is calculated from a person's weight and height.  · BMI may help estimate how much of a person's weight is composed of fat. BMI can help identify those who may be at higher risk for certain medical problems.  · BMI can be measured using English measurements or metric measurements.  · BMI charts are used to identify whether you are underweight, normal weight, overweight, or obese.  This information is not intended to replace advice given to you by your health care provider. Make sure you discuss any questions you have with your health care provider.  Document Revised: 09/09/2020 Document Reviewed: 07/17/2020  [employer] Patient Education © 2021 [employer]    Exercising to Stay Healthy  To become healthy and stay healthy, it is recommended that you do moderate-intensity and vigorous-intensity exercise. You can tell that you are exercising at a moderate intensity if your heart starts beating faster and you start breathing faster but can still hold a conversation. You can tell that you are exercising at a vigorous intensity  if you are breathing much harder and faster and cannot hold a conversation while exercising.  Exercising regularly is important. It has many health benefits, such as:  · Improving overall fitness, flexibility, and endurance.  · Increasing bone density.  · Helping with weight control.  · Decreasing body fat.  · Increasing muscle strength.  · Reducing stress and tension.  · Improving overall health.  How often should I exercise?  Choose an activity that you enjoy, and set realistic goals. Your health care provider can help you make an activity plan that works for you.  Exercise regularly as told by your health care provider. This may include:  · Doing strength training two times a week, such as:  ? Lifting weights.  ? Using resistance bands.  ? Push-ups.  ? Sit-ups.  ? Yoga.  · Doing a certain intensity of exercise for a given amount of time. Choose from these options:  ? A total of 150 minutes of moderate-intensity exercise every week.  ? A total of 75 minutes of vigorous-intensity exercise every week.  ? A mix of moderate-intensity and vigorous-intensity exercise every week.  Children, pregnant women, people who have not exercised regularly, people who are overweight, and older adults may need to talk with a health care provider about what activities are safe to do. If you have a medical condition, be sure to talk with your health care provider before you start a new exercise program.  What are some exercise ideas?  Moderate-intensity exercise ideas include:  · Walking 1 mile (1.6 km) in about 15 minutes.  · Biking.  · Hiking.  · Golfing.  · Dancing.  · Water aerobics.  Vigorous-intensity exercise ideas include:  · Walking 4.5 miles (7.2 km) or more in about 1 hour.  · Jogging or running 5 miles (8 km) in about 1 hour.  · Biking 10 miles (16.1 km) or more in about 1 hour.  · Lap swimming.  · Roller-skating or in-line skating.  · Cross-country skiing.  · Vigorous competitive sports, such as football, basketball, and  soccer.  · Jumping rope.  · Aerobic dancing.  What are some everyday activities that can help me to get exercise?  · Yard work, such as:  ? Pushing a .  ? Raking and bagging leaves.  · Washing your car.  · Pushing a stroller.  · Shoveling snow.  · Gardening.  · Washing windows or floors.  How can I be more active in my day-to-day activities?  · Use stairs instead of an elevator.  · Take a walk during your lunch break.  · If you drive, park your car farther away from your work or school.  · If you take public transportation, get off one stop early and walk the rest of the way.  · Stand up or walk around during all of your indoor phone calls.  · Get up, stretch, and walk around every 30 minutes throughout the day.  · Enjoy exercise with a friend. Support to continue exercising will help you keep a regular routine of activity.  What guidelines can I follow while exercising?  · Before you start a new exercise program, talk with your health care provider.  · Do not exercise so much that you hurt yourself, feel dizzy, or get very short of breath.  · Wear comfortable clothes and wear shoes with good support.  · Drink plenty of water while you exercise to prevent dehydration or heat stroke.  · Work out until your breathing and your heartbeat get faster.  Where to find more information  · [place] Department of Health and Human Services: www.hhs.gov  · Centers for Disease Control and Prevention (CDC): www.cdc.gov  Summary  · Exercising regularly is important. It will improve your overall fitness, flexibility, and endurance.  · Regular exercise also will improve your overall health. It can help you control your weight, reduce stress, and improve your bone density.  · Do not exercise so much that you hurt yourself, feel dizzy, or get very short of breath.  · Before you start a new exercise program, talk with your health care provider.  This information is not intended to replace advice given to you by your health care  provider. Make sure you discuss any questions you have with your health care provider.  Document Revised: 11/30/2018 Document Reviewed: 11/08/2018  [employer] Patient Education © 2021 [employer]    Mindfulness-Based Stress Reduction  Mindfulness-based stress reduction (MBSR) is a program that helps people learn to practice mindfulness. Mindfulness is the practice of intentionally paying attention to the present moment. It can be learned and practiced through techniques such as education, breathing exercises, meditation, and yoga. MBSR includes several mindfulness techniques in one program.  MBSR works best when you understand the treatment, are willing to try new things, and can commit to spending time practicing what you learn. MBSR training may include learning about:  · How your emotions, thoughts, and reactions affect your body.  · New ways to respond to things that cause negative thoughts to start (triggers).  · How to notice your thoughts and let go of them.  · Practicing awareness of everyday things that you normally do without thinking.  · The techniques and goals of different types of meditation.  What are the benefits of MBSR?  MBSR can have many benefits, which include helping you to:  · Develop self-awareness. This refers to knowing and understanding yourself.  · Learn skills and attitudes that help you to participate in your own health care.  · Learn new ways to care for yourself.  · Be more accepting about how things are, and let things go.  · Be less judgmental and approach things with an open mind.  · Be patient with yourself and trust yourself more.  MBSR has also been shown to:  · Reduce negative emotions, such as depression and anxiety.  · Improve memory and focus.  · Change how you sense and approach pain.  · Boost your body's ability to fight infections.  · Help you connect better with other people.  · Improve your sense of well-being.  Follow these instructions at home:    · Find a local  in-person or online MBSR program.  · Set aside some time regularly for mindfulness practice.  · Find a mindfulness practice that works best for you. This may include one or more of the following:  ? Meditation. Meditation involves focusing your mind on a certain thought or activity.  ? Breathing awareness exercises. These help you to stay present by focusing on your breath.  ? Body scan. For this practice, you lie down and pay attention to each part of your body from head to toe. You can identify tension and soreness and intentionally relax parts of your body.  ? Yoga. Yoga involves stretching and breathing, and it can improve your ability to move and be flexible. It can also provide an experience of testing your body's limits, which can help you release stress.  ? Mindful eating. This way of eating involves focusing on the taste, texture, color, and smell of each bite of food. Because this slows down eating and helps you feel full sooner, it can be an important part of a weight-loss plan.  · Find a podcast or recording that provides guidance for breathing awareness, body scan, or meditation exercises. You can listen to these any time when you have a free moment to rest without distractions.  · Follow your treatment plan as told by your health care provider. This may include taking regular medicines and making changes to your diet or lifestyle as recommended.  How to practice mindfulness  To do a basic awareness exercise:  · Find a comfortable place to sit.  · Pay attention to the present moment. Observe your thoughts, feelings, and surroundings just as they are.  · Avoid placing judgment on yourself, your feelings, or your surroundings. Make note of any judgment that comes up, and let it go.  · Your mind may wander, and that is okay. Make note of when your thoughts drift, and return your attention to the present moment.  To do basic mindfulness meditation:  · Find a comfortable place to sit. This may include a  stable chair or a firm floor cushion.  ? Sit upright with your back straight. Let your arms fall next to your side with your hands resting on your legs.  ? If sitting in a chair, rest your feet flat on the floor.  ? If sitting on a cushion, cross your legs in front of you.  · Keep your head in a neutral position with your chin dropped slightly. Relax your jaw and rest the tip of your tongue on the roof of your mouth. Drop your gaze to the floor. You can close your eyes if you like.  · Breathe normally and pay attention to your breath. Feel the air moving in and out of your nose. Feel your belly expanding and relaxing with each breath.  · Your mind may wander, and that is okay. Make note of when your thoughts drift, and return your attention to your breath.  · Avoid placing judgment on yourself, your feelings, or your surroundings. Make note of any judgment or feelings that come up, let them go, and bring your attention back to your breath.  · When you are ready, lift your gaze or open your eyes. Pay attention to how your body feels after the meditation.  Where to find more information  You can find more information about MBSR from:  · Your health care provider.  · Community-based meditation centers or programs.  · Programs offered near you.  Summary  · Mindfulness-based stress reduction (MBSR) is a program that teaches you how to intentionally pay attention to the present moment. It is used with other treatments to help you cope better with daily stress, emotions, and pain.  · MBSR focuses on developing self-awareness, which allows you to respond to life stress without judgment or negative emotions.  · MBSR programs may involve learning different mindfulness practices, such as breathing exercises, meditation, yoga, body scan, or mindful eating. Find a mindfulness practice that works best for you, and set aside time for it on a regular basis.  This information is not intended to replace advice given to you by your  health care provider. Make sure you discuss any questions you have with your health care provider.  Document Revised: 11/30/2018 Document Reviewed: 04/26/2018  [employer] Patient Education © 2021 [employer]

## 2021-06-23 RX ORDER — PROMETHAZINE HYDROCHLORIDE 25 MG/1
25 TABLET ORAL EVERY 8 HOURS PRN
Qty: 12 TABLET | Refills: 3 | Status: SHIPPED | OUTPATIENT
Start: 2021-06-23 | End: 2021-12-20

## 2021-07-13 DIAGNOSIS — G43.109 MIGRAINE WITH AURA AND WITHOUT STATUS MIGRAINOSUS, NOT INTRACTABLE: ICD-10-CM

## 2021-07-13 RX ORDER — NAPROXEN 500 MG/1
TABLET ORAL
Qty: 60 TABLET | Refills: 4 | Status: SHIPPED | OUTPATIENT
Start: 2021-07-13 | End: 2022-01-21

## 2021-07-16 RX ORDER — FAMOTIDINE 40 MG/1
40 TABLET, FILM COATED ORAL 2 TIMES DAILY
Qty: 180 TABLET | Refills: 1 | Status: SHIPPED | OUTPATIENT
Start: 2021-07-16 | End: 2021-12-20

## 2021-07-23 RX ORDER — CYCLOBENZAPRINE HCL 10 MG
10 TABLET ORAL 2 TIMES DAILY PRN
Qty: 60 TABLET | Refills: 2 | Status: SHIPPED | OUTPATIENT
Start: 2021-07-23 | End: 2021-10-28

## 2021-08-10 DIAGNOSIS — D22.9 CHANGE IN MOLE: Primary | ICD-10-CM

## 2021-09-22 ENCOUNTER — HOSPITAL ENCOUNTER (OUTPATIENT)
Dept: MAMMOGRAPHY | Facility: HOSPITAL | Age: 51
Discharge: HOME | End: 2021-09-22
Admitting: RADIOLOGY

## 2021-09-22 DIAGNOSIS — R92.8 ABNORMAL MAMMOGRAM: ICD-10-CM

## 2021-09-22 PROCEDURE — 77065 DX MAMMO INCL CAD UNI: CPT

## 2021-09-22 PROCEDURE — G0279 TOMOSYNTHESIS, MAMMO: HCPCS

## 2021-09-22 PROCEDURE — 77061 BREAST TOMOSYNTHESIS UNI: CPT | Performed by: RADIOLOGY

## 2021-09-22 PROCEDURE — 77065 DX MAMMO INCL CAD UNI: CPT | Performed by: RADIOLOGY

## 2021-10-20 RX ORDER — PRAVASTATIN SODIUM 20 MG
TABLET ORAL
Qty: 90 TABLET | Refills: 1 | Status: SHIPPED | OUTPATIENT
Start: 2021-10-20 | End: 2022-04-08

## 2021-10-28 RX ORDER — FLUTICASONE PROPIONATE 50 MCG
1 SPRAY, SUSPENSION (ML) NASAL NIGHTLY
Qty: 16 ML | Refills: 5 | Status: SHIPPED | OUTPATIENT
Start: 2021-10-28 | End: 2021-12-20 | Stop reason: SDUPTHER

## 2021-10-28 RX ORDER — CYCLOBENZAPRINE HCL 10 MG
10 TABLET ORAL 2 TIMES DAILY PRN
Qty: 60 TABLET | Refills: 2 | Status: SHIPPED | OUTPATIENT
Start: 2021-10-28 | End: 2021-12-20 | Stop reason: SDUPTHER

## 2021-12-20 DIAGNOSIS — G43.109 MIGRAINE WITH AURA AND WITHOUT STATUS MIGRAINOSUS, NOT INTRACTABLE: ICD-10-CM

## 2021-12-20 RX ORDER — FLUTICASONE PROPIONATE 50 MCG
1 SPRAY, SUSPENSION (ML) NASAL NIGHTLY
Qty: 16 ML | Refills: 5 | Status: SHIPPED | OUTPATIENT
Start: 2021-12-20 | End: 2022-06-30 | Stop reason: SDUPTHER

## 2021-12-20 RX ORDER — SUMATRIPTAN 6 MG/.5ML
6 INJECTION, SOLUTION SUBCUTANEOUS ONCE
Qty: 0.5 ML | Refills: 5 | Status: SHIPPED | OUTPATIENT
Start: 2021-12-20 | End: 2022-04-08

## 2021-12-20 RX ORDER — CYCLOBENZAPRINE HCL 10 MG
10 TABLET ORAL 2 TIMES DAILY PRN
Qty: 60 TABLET | Refills: 2 | Status: SHIPPED | OUTPATIENT
Start: 2021-12-20 | End: 2022-04-20 | Stop reason: SDUPTHER

## 2021-12-20 RX ORDER — PROMETHAZINE HYDROCHLORIDE 25 MG/1
25 TABLET ORAL EVERY 8 HOURS PRN
Qty: 12 TABLET | Refills: 3 | Status: SHIPPED | OUTPATIENT
Start: 2021-12-20 | End: 2022-06-30 | Stop reason: SDUPTHER

## 2021-12-20 RX ORDER — FAMOTIDINE 40 MG/1
TABLET, FILM COATED ORAL
Qty: 180 TABLET | Refills: 1 | Status: SHIPPED | OUTPATIENT
Start: 2021-12-20 | End: 2021-12-20

## 2021-12-20 RX ORDER — PROMETHAZINE HYDROCHLORIDE 25 MG/1
25 TABLET ORAL EVERY 8 HOURS PRN
Qty: 12 TABLET | Refills: 3 | Status: SHIPPED | OUTPATIENT
Start: 2021-12-20 | End: 2021-12-20

## 2021-12-20 RX ORDER — FAMOTIDINE 40 MG/1
40 TABLET, FILM COATED ORAL 2 TIMES DAILY
Qty: 180 TABLET | Refills: 1 | Status: SHIPPED | OUTPATIENT
Start: 2021-12-20 | End: 2022-08-25

## 2021-12-20 RX ORDER — SUMATRIPTAN 100 MG/1
TABLET, FILM COATED ORAL
Qty: 18 TABLET | Refills: 8 | Status: SHIPPED | OUTPATIENT
Start: 2021-12-20 | End: 2022-02-14

## 2021-12-20 NOTE — TELEPHONE ENCOUNTER
Rx Refill Note  Requested Prescriptions     Pending Prescriptions Disp Refills   • promethazine (PHENERGAN) 25 MG tablet [Pharmacy Med Name: PROMETHAZINE 25 MG TABLET] 12 tablet 3     Sig: TAKE 1 TABLET BY MOUTH EVERY 8 (EIGHT) HOURS AS NEEDED FOR NAUSEA OR VOMITING.   • famotidine (PEPCID) 40 MG tablet [Pharmacy Med Name: FAMOTIDINE 40 MG TABLET] 180 tablet 1     Sig: TAKE 1 TABLET BY MOUTH TWICE A DAY      Last office visit with prescribing clinician: 6/11/2021      Next office visit with prescribing clinician: Visit date not found            Izabel Ireland LPN  12/20/21, 08:39 EST

## 2021-12-20 NOTE — TELEPHONE ENCOUNTER
Rx Refill Note  Requested Prescriptions     Pending Prescriptions Disp Refills   • SUMAtriptan (IMITREX) 100 MG tablet 18 tablet 8     Sig: Take one tablet at onset of headache. May repeat dose one time in 2 hours if headache not relieved.   • SUMAtriptan (IMITREX) 6 MG/0.5ML injection 5 mL 5     Sig: Inject prescribed dose at onset of headache. May repeat dose one time in 1 hour(s) if headache not relieved.   • promethazine (PHENERGAN) 25 MG tablet 12 tablet 3     Sig: Take 1 tablet by mouth Every 8 (Eight) Hours As Needed for Nausea or Vomiting.   • famotidine (PEPCID) 40 MG tablet 180 tablet 1     Sig: Take 1 tablet by mouth 2 (Two) Times a Day.   • fluticasone (FLONASE) 50 MCG/ACT nasal spray 16 mL 5     Si spray into the nostril(s) as directed by provider Every Night.   • cyclobenzaprine (FLEXERIL) 10 MG tablet 60 tablet 2     Sig: Take 1 tablet by mouth 2 (Two) Times a Day As Needed for Muscle Spasms.      Last office visit with prescribing clinician: 2021      Next office visit with prescribing clinician: Visit date not found            Izabel Ireland LPN  21, 09:31 EST

## 2022-01-21 DIAGNOSIS — G43.109 MIGRAINE WITH AURA AND WITHOUT STATUS MIGRAINOSUS, NOT INTRACTABLE: ICD-10-CM

## 2022-01-21 RX ORDER — NAPROXEN 500 MG/1
TABLET ORAL
Qty: 60 TABLET | Refills: 4 | Status: SHIPPED | OUTPATIENT
Start: 2022-01-21 | End: 2022-11-07 | Stop reason: SDUPTHER

## 2022-02-10 ENCOUNTER — TELEPHONE (OUTPATIENT)
Dept: INTERNAL MEDICINE | Facility: CLINIC | Age: 52
End: 2022-02-10

## 2022-02-12 DIAGNOSIS — G43.109 MIGRAINE WITH AURA AND WITHOUT STATUS MIGRAINOSUS, NOT INTRACTABLE: ICD-10-CM

## 2022-02-14 RX ORDER — SUMATRIPTAN 100 MG/1
TABLET, FILM COATED ORAL
Qty: 18 TABLET | Refills: 1 | Status: SHIPPED | OUTPATIENT
Start: 2022-02-14 | End: 2022-04-08

## 2022-04-08 ENCOUNTER — LAB (OUTPATIENT)
Dept: LAB | Facility: HOSPITAL | Age: 52
End: 2022-04-08

## 2022-04-08 ENCOUNTER — OFFICE VISIT (OUTPATIENT)
Dept: INTERNAL MEDICINE | Facility: CLINIC | Age: 52
End: 2022-04-08

## 2022-04-08 VITALS
OXYGEN SATURATION: 99 % | HEIGHT: 64 IN | TEMPERATURE: 97.8 F | HEART RATE: 98 BPM | SYSTOLIC BLOOD PRESSURE: 122 MMHG | DIASTOLIC BLOOD PRESSURE: 82 MMHG | BODY MASS INDEX: 25.78 KG/M2 | WEIGHT: 151 LBS

## 2022-04-08 DIAGNOSIS — R41.3 MEMORY LOSS: ICD-10-CM

## 2022-04-08 DIAGNOSIS — G43.109 MIGRAINE WITH AURA AND WITHOUT STATUS MIGRAINOSUS, NOT INTRACTABLE: Primary | ICD-10-CM

## 2022-04-08 DIAGNOSIS — E55.9 VITAMIN D DEFICIENCY: ICD-10-CM

## 2022-04-08 DIAGNOSIS — E53.8 B12 DEFICIENCY: ICD-10-CM

## 2022-04-08 DIAGNOSIS — G43.109 MIGRAINE WITH AURA AND WITHOUT STATUS MIGRAINOSUS, NOT INTRACTABLE: ICD-10-CM

## 2022-04-08 LAB
25(OH)D3 SERPL-MCNC: 9.4 NG/ML (ref 30–100)
ALBUMIN SERPL-MCNC: 4.9 G/DL (ref 3.5–5.2)
ALBUMIN/GLOB SERPL: 2 G/DL
ALP SERPL-CCNC: 77 U/L (ref 39–117)
ALT SERPL W P-5'-P-CCNC: 10 U/L (ref 1–33)
ANION GAP SERPL CALCULATED.3IONS-SCNC: 10 MMOL/L (ref 5–15)
AST SERPL-CCNC: 14 U/L (ref 1–32)
BILIRUB SERPL-MCNC: 0.2 MG/DL (ref 0–1.2)
BUN SERPL-MCNC: 17 MG/DL (ref 6–20)
BUN/CREAT SERPL: 21.3 (ref 7–25)
CALCIUM SPEC-SCNC: 9.4 MG/DL (ref 8.6–10.5)
CHLORIDE SERPL-SCNC: 102 MMOL/L (ref 98–107)
CO2 SERPL-SCNC: 25 MMOL/L (ref 22–29)
CREAT SERPL-MCNC: 0.8 MG/DL (ref 0.57–1)
CRP SERPL-MCNC: <0.3 MG/DL (ref 0–0.5)
EGFRCR SERPLBLD CKD-EPI 2021: 89.9 ML/MIN/1.73
GLOBULIN UR ELPH-MCNC: 2.4 GM/DL
GLUCOSE SERPL-MCNC: 81 MG/DL (ref 65–99)
POTASSIUM SERPL-SCNC: 3.8 MMOL/L (ref 3.5–5.2)
PROT SERPL-MCNC: 7.3 G/DL (ref 6–8.5)
SODIUM SERPL-SCNC: 137 MMOL/L (ref 136–145)
T3FREE SERPL-MCNC: 3.04 PG/ML (ref 2–4.4)
T4 FREE SERPL-MCNC: 1.28 NG/DL (ref 0.93–1.7)
TSH SERPL DL<=0.05 MIU/L-ACNC: 1.85 UIU/ML (ref 0.27–4.2)
VIT B12 BLD-MCNC: 477 PG/ML (ref 211–946)

## 2022-04-08 PROCEDURE — 84439 ASSAY OF FREE THYROXINE: CPT

## 2022-04-08 PROCEDURE — 84481 FREE ASSAY (FT-3): CPT

## 2022-04-08 PROCEDURE — 82607 VITAMIN B-12: CPT

## 2022-04-08 PROCEDURE — 99214 OFFICE O/P EST MOD 30 MIN: CPT | Performed by: PHYSICIAN ASSISTANT

## 2022-04-08 PROCEDURE — 80050 GENERAL HEALTH PANEL: CPT

## 2022-04-08 PROCEDURE — 85652 RBC SED RATE AUTOMATED: CPT

## 2022-04-08 PROCEDURE — 96372 THER/PROPH/DIAG INJ SC/IM: CPT | Performed by: PHYSICIAN ASSISTANT

## 2022-04-08 PROCEDURE — 82306 VITAMIN D 25 HYDROXY: CPT

## 2022-04-08 PROCEDURE — 86140 C-REACTIVE PROTEIN: CPT

## 2022-04-08 RX ORDER — TRIAMCINOLONE ACETONIDE 40 MG/ML
80 INJECTION, SUSPENSION INTRA-ARTICULAR; INTRAMUSCULAR ONCE
Status: COMPLETED | OUTPATIENT
Start: 2022-04-08 | End: 2022-04-08

## 2022-04-08 RX ORDER — DIAZEPAM 10 MG/1
10 TABLET ORAL EVERY 8 HOURS PRN
Qty: 10 TABLET | Refills: 0 | Status: SHIPPED | OUTPATIENT
Start: 2022-04-08 | End: 2023-01-27

## 2022-04-08 RX ORDER — ATOGEPANT 60 MG/1
60 TABLET ORAL DAILY
Qty: 30 TABLET | Refills: 11 | Status: SHIPPED | OUTPATIENT
Start: 2022-04-08 | End: 2022-04-13 | Stop reason: SDUPTHER

## 2022-04-08 RX ADMIN — TRIAMCINOLONE ACETONIDE 80 MG: 40 INJECTION, SUSPENSION INTRA-ARTICULAR; INTRAMUSCULAR at 13:44

## 2022-04-08 NOTE — PROGRESS NOTES
"[facility] Internal Medicine  The patient has consented to being recorded using PENG.    Mildred French  1970   [id number]      Patient Care Team:  Roula Clayton MD as PCP - General (Internal Medicine)  Reinaldo Oneil MD as Consulting Physician (Neurology)    Chief Complaint::   Chief Complaint   Patient presents with   • Migraine        HPI  Mariam French, 52-year-old female, date of birth 1970, presents today for an evaluation of migraine headache.     Migraine  History of migraines x18 years. She reports she has pain and it is either a \"migraine, inner ear issue, or an issue with her head.\" She notes she is having memory loss. The patient states her migraines have always subsided within 30 minutes of taking sumatriptan 10, but this pain is not subsiding after medication. She reports this headache has lasted for approximately 2 weeks. She notes she took sumatriptan and naproxen this morning for her migraine and she is feeling well currently. She states she has taken sumatriptan daily for the last 2 weeks, and had some relief. The patient notes on either 04/05/2022 or 04/06/2022 the pain was severe and she considered going to the emergency room, but did not feel as if she could sit in the emergency room. She states in the last 18 years of her migraines with the headaches occurring 4 to 5 times per week she has never had pain so severe and never believed she would even consider going to the emergency room. She denies any medication changes or missing her routine medications the previous 2 weeks. She reports the flap on her left eye has loosened from her previous LASIK surgery and had been causing some issues; however, it has now been corrected. She states she saw Dr. Felder in [place], [place]. in approximately 12/2021 and he corrected the loose flap. She reports it has improved her light sensitivity; however, since that time her headaches have been different and more intense, as well as the " medication not working at times.   She denies taking preventative migraine medication and adds none of the preventative medications ever worked to prevent her migraines.  Unable to tolerate Topamax due to an increase in anxiety. She reports she has a neurology appointment with Dr. Amador on 05/09/2022 and has been considering injections or an infusion. She states her last labs were performed by  in 06/2021.   She states he has had a cocktail of a prednisone injection, Lithium, and Valium for her migraines previously. Additionally, she states she would like to try the cocktail again, as this is the worst headache she has ever had.       Memory loss  The patient inquires if she needs a CT scan or MRI. She states he has no memory of her  answering question or giving her dog Pepto Bismol. She notes she is stressed and her memory loss may be secondary to her stress.     Social history  She states she is currently employed and has an understanding boss. She notes approximately 2 weeks ago she got a job offer with the [employer] system, but had to decline the position secondary to not being able to lay down if needed with her migraines. She reports her current boss will let her lay down, if needed.      Patient Active Problem List   Diagnosis   • Primary insomnia   • Iron deficiency   • Vitamin D deficiency   • Goiter, nontoxic, multinodular   • Cervicalgia   • Myalgia   • Major depressive disorder, recurrent, moderate (HCC)   • Generalized anxiety disorder   • Hair loss   • Screening mammogram, encounter for   • Irregular periods   • Seasonal allergic rhinitis due to pollen   • Other fatigue   • Migraine with aura and without status migrainosus, not intractable   • Slow transit constipation   • B12 deficiency   • Obstructive sleep apnea   • Back pain   • Overweight with body mass index (BMI) of 25 to 25.9 in adult   • Right hip pain   • Chest pain, atypical   • Arthralgia of multiple sites   • Sinus congestion    • Hypercholesterolemia   • Annual physical exam   • Memory loss        Past Medical History:   Diagnosis Date   • Anxiety    • Bipolar II disorder (HCC)    • Fibromyalgia    • GERD (gastroesophageal reflux disease)    • Goiter    • Major depressive disorder    • Migraines     4-5 times a week    • Scoliosis    • Sleep apnea        Past Surgical History:   Procedure Laterality Date   • BARIATRIC SURGERY     •  SECTION  1997   •  SECTION  1988   • TONSILLECTOMY     • WISDOM TOOTH EXTRACTION         Family History   Problem Relation Age of Onset   • Thyroid disease Mother    • Hypertension Mother    • Hyperlipidemia Mother    • Obesity Mother    • Depression Mother    • Obesity Sister    • Depression Sister    • Thyroid disease Sister    • Thyroid disease Maternal Aunt    • Coronary artery disease Maternal Grandmother    • Thyroid disease Father    • Diabetes Other    • Sudden death Son 22        Brugada syn   • Heart defect Son 21        Positive for Brugada syndrome.  No structural heart disease.  Asymptomatic   • Breast cancer Neg Hx        Social History     Socioeconomic History   • Marital status:    Tobacco Use   • Smoking status: Never Smoker   • Smokeless tobacco: Never Used   Substance and Sexual Activity   • Alcohol use: No   • Drug use: No       Allergies   Allergen Reactions   • Atenolol Other (See Comments)     Suicidal    • Hydrocodone Unknown (See Comments)     Patient is not aware of a reaction to this drug    • Topamax [Topiramate] Anxiety       Review of Systems   Constitutional: Positive for fatigue. Negative for activity change, appetite change, diaphoresis, unexpected weight gain and unexpected weight loss.   HENT: Negative for hearing loss.    Eyes: Positive for visual disturbance.   Respiratory: Positive for shortness of breath. Negative for chest tightness.    Cardiovascular: Negative for chest pain, palpitations and leg swelling.   Gastrointestinal:  "Negative for abdominal pain, blood in stool, GERD and indigestion.   Endocrine: Negative for cold intolerance and heat intolerance.   Genitourinary: Negative for dysuria and hematuria.   Musculoskeletal: Negative for arthralgias and myalgias.   Skin: Negative for skin lesions.   Neurological: Positive for dizziness, weakness, light-headedness and headache. Negative for tremors, seizures, syncope, speech difficulty, memory problem and confusion.   Hematological: Does not bruise/bleed easily.   Psychiatric/Behavioral: Negative for sleep disturbance and depressed mood. The patient is not nervous/anxious.         Vital Signs  Vitals:    04/08/22 1123   BP: 122/82   BP Location: Right arm   Patient Position: Sitting   Cuff Size: Adult   Pulse: 98   Temp: 97.8 °F (36.6 °C)   TempSrc: Temporal   SpO2: 99%   Weight: 68.5 kg (151 lb)   Height: 162.6 cm (64.02\")   PainSc: 0-No pain     Body mass index is 25.91 kg/m².  Patient's Body mass index is 25.91 kg/m². indicating that she is overweight (BMI 25-29.9). Patient's (Body mass index is 25.91 kg/m².) indicates that they are overweight with health conditions that include none . Weight is unchanged. BMI is is above average; BMI management plan is completed. We discussed portion control and increasing exercise. .      Current Outpatient Medications:   •  ALPRAZolam (XANAX) 1 MG tablet, TAKE 1 TABLET BY MOUTH EVERY DAY ** MAY REPEAT 1 TABLET FOR ANXIETY **, Disp: , Rfl: 1  •  ALPRAZolam XR (XANAX XR) 1 MG 24 hr tablet, Take 1 mg by mouth Every Morning., Disp: , Rfl:   •  Cholecalciferol (Vitamin D) 50 MCG (2000 UT) capsule, Take  by mouth., Disp: , Rfl:   •  cyclobenzaprine (FLEXERIL) 10 MG tablet, Take 1 tablet by mouth 2 (Two) Times a Day As Needed for Muscle Spasms., Disp: 60 tablet, Rfl: 2  •  famotidine (PEPCID) 40 MG tablet, Take 1 tablet by mouth 2 (Two) Times a Day., Disp: 180 tablet, Rfl: 1  •  fluconazole (DIFLUCAN) 150 MG tablet, Take 1 tablet by mouth Daily As Needed " (yeast vaginitis)., Disp: 3 tablet, Rfl: 0  •  fluticasone (FLONASE) 50 MCG/ACT nasal spray, 1 spray into the nostril(s) as directed by provider Every Night., Disp: 16 mL, Rfl: 5  •  lamoTRIgine (LaMICtal) 150 MG tablet, Take 300 mg by mouth Every Morning., Disp: , Rfl: 1  •  naproxen (NAPROSYN) 500 MG tablet, TAKE 1 TABLET BY MOUTH TWICE A DAY AS NEEDED FOR HEADACHE TAKE WITH FOOD, Disp: 60 tablet, Rfl: 4  •  polyethylene glycol (GlycoLax) 17 GM/SCOOP powder, Take 17 g by mouth Daily., Disp: 850 g, Rfl: 5  •  promethazine (PHENERGAN) 25 MG tablet, Take 1 tablet by mouth Every 8 (Eight) Hours As Needed for Nausea or Vomiting., Disp: 12 tablet, Rfl: 3  •  VYVANSE 70 MG capsule, Take 70 mg by mouth Every Morning  , Disp: , Rfl:   •  ziprasidone (GEODON) 80 MG capsule, Take 80 mg by mouth 2 (Two) Times a Day., Disp: , Rfl: 1  •  Atogepant (Qulipta) 60 MG tablet, Take 1 tablet by mouth Daily., Disp: 30 tablet, Rfl: 11  •  diazePAM (Valium) 10 MG tablet, Take 1 tablet by mouth Every 8 (Eight) Hours As Needed (migraine)., Disp: 10 tablet, Rfl: 0    Physical Exam  Vitals reviewed.   Constitutional:       Appearance: Normal appearance. She is well-developed.   HENT:      Head: Normocephalic and atraumatic.      Right Ear: Hearing, tympanic membrane, ear canal and external ear normal. There is no impacted cerumen.      Left Ear: Hearing, tympanic membrane, ear canal and external ear normal. There is no impacted cerumen.      Nose: Nose normal.      Right Sinus: No maxillary sinus tenderness.      Left Sinus: No maxillary sinus tenderness.      Mouth/Throat:      Mouth: Mucous membranes are moist.      Pharynx: Oropharynx is clear. Uvula midline. No posterior oropharyngeal erythema.   Eyes:      General: Lids are normal.      Conjunctiva/sclera: Conjunctivae normal.      Pupils: Pupils are equal, round, and reactive to light.   Cardiovascular:      Rate and Rhythm: Normal rate and regular rhythm.      Heart sounds: Normal  heart sounds.   Pulmonary:      Effort: Pulmonary effort is normal.      Breath sounds: Normal breath sounds.   Abdominal:      General: Bowel sounds are normal.      Palpations: Abdomen is soft.      Tenderness: There is no right CVA tenderness or left CVA tenderness.   Musculoskeletal:         General: Normal range of motion.      Cervical back: Full passive range of motion without pain, normal range of motion and neck supple.   Skin:     General: Skin is warm and dry.   Neurological:      General: No focal deficit present.      Mental Status: She is alert and oriented to person, place, and time. Mental status is at baseline.      Cranial Nerves: No cranial nerve deficit.      Sensory: No sensory deficit.      Coordination: Coordination normal.      Deep Tendon Reflexes: Reflexes are normal and symmetric. Reflexes normal.   Psychiatric:         Mood and Affect: Mood normal.         Speech: Speech normal.         Behavior: Behavior normal.         Thought Content: Thought content normal.         Judgment: Judgment normal.          ACE III MINI            Results Review:    Recent Results (from the past 672 hour(s))   Comprehensive Metabolic Panel    Collection Time: 04/08/22 12:23 PM    Specimen: Blood   Result Value Ref Range    Glucose 81 65 - 99 mg/dL    BUN 17 6 - 20 mg/dL    Creatinine 0.80 0.57 - 1.00 mg/dL    Sodium 137 136 - 145 mmol/L    Potassium 3.8 3.5 - 5.2 mmol/L    Chloride 102 98 - 107 mmol/L    CO2 25.0 22.0 - 29.0 mmol/L    Calcium 9.4 8.6 - 10.5 mg/dL    Total Protein 7.3 6.0 - 8.5 g/dL    Albumin 4.90 3.50 - 5.20 g/dL    ALT (SGPT) 10 1 - 33 U/L    AST (SGOT) 14 1 - 32 U/L    Alkaline Phosphatase 77 39 - 117 U/L    Total Bilirubin 0.2 0.0 - 1.2 mg/dL    Globulin 2.4 gm/dL    A/G Ratio 2.0 g/dL    BUN/Creatinine Ratio 21.3 7.0 - 25.0    Anion Gap 10.0 5.0 - 15.0 mmol/L    eGFR 89.9 >60.0 mL/min/1.73   TSH    Collection Time: 04/08/22 12:23 PM    Specimen: Blood   Result Value Ref Range    TSH  1.850 0.270 - 4.200 uIU/mL   T4, Free    Collection Time: 04/08/22 12:23 PM    Specimen: Blood   Result Value Ref Range    Free T4 1.28 0.93 - 1.70 ng/dL   T3, Free    Collection Time: 04/08/22 12:23 PM    Specimen: Blood   Result Value Ref Range    T3, Free 3.04 2.00 - 4.40 pg/mL   Vitamin B12    Collection Time: 04/08/22 12:23 PM    Specimen: Blood   Result Value Ref Range    Vitamin B-12 477 211 - 946 pg/mL   Vitamin D 25 Hydroxy    Collection Time: 04/08/22 12:23 PM    Specimen: Blood   Result Value Ref Range    25 Hydroxy, Vitamin D 9.4 (L) 30.0 - 100.0 ng/ml   C-reactive Protein    Collection Time: 04/08/22 12:23 PM    Specimen: Blood   Result Value Ref Range    C-Reactive Protein <0.30 0.00 - 0.50 mg/dL   Sedimentation Rate    Collection Time: 04/08/22 12:23 PM    Specimen: Blood   Result Value Ref Range    Sed Rate 13 0 - 20 mm/hr   CBC Auto Differential    Collection Time: 04/08/22 12:23 PM    Specimen: Blood   Result Value Ref Range    WBC 5.80 3.40 - 10.80 10*3/mm3    RBC 4.84 3.77 - 5.28 10*6/mm3    Hemoglobin 11.3 (L) 12.0 - 15.9 g/dL    Hematocrit 36.6 34.0 - 46.6 %    MCV 75.6 (L) 79.0 - 97.0 fL    MCH 23.3 (L) 26.6 - 33.0 pg    MCHC 30.9 (L) 31.5 - 35.7 g/dL    RDW 14.1 12.3 - 15.4 %    RDW-SD 37.8 37.0 - 54.0 fl    MPV 9.9 6.0 - 12.0 fL    Platelets 381 140 - 450 10*3/mm3    Neutrophil % 51.9 42.7 - 76.0 %    Lymphocyte % 37.1 19.6 - 45.3 %    Monocyte % 7.9 5.0 - 12.0 %    Eosinophil % 1.2 0.3 - 6.2 %    Basophil % 1.7 (H) 0.0 - 1.5 %    Immature Grans % 0.2 0.0 - 0.5 %    Neutrophils, Absolute 3.01 1.70 - 7.00 10*3/mm3    Lymphocytes, Absolute 2.15 0.70 - 3.10 10*3/mm3    Monocytes, Absolute 0.46 0.10 - 0.90 10*3/mm3    Eosinophils, Absolute 0.07 0.00 - 0.40 10*3/mm3    Basophils, Absolute 0.10 0.00 - 0.20 10*3/mm3    Immature Grans, Absolute 0.01 0.00 - 0.05 10*3/mm3    nRBC 0.0 0.0 - 0.2 /100 WBC     Procedures    Medication Review: Medications reviewed and noted    Social History      Socioeconomic History   • Marital status:    Tobacco Use   • Smoking status: Never Smoker   • Smokeless tobacco: Never Used   Substance and Sexual Activity   • Alcohol use: No   • Drug use: No        Assessment/Plan:    Problem List Items Addressed This Visit        Endocrine and Metabolic    Vitamin D deficiency    Overview     Continue current dose of vitamin D3 daily.           Relevant Orders    Vitamin D 25 Hydroxy (Completed)    B12 deficiency    Overview     Recheck today           Relevant Orders    Vitamin B12 (Completed)       Neuro    Migraine with aura and without status migrainosus, not intractable - Primary    Overview     Discussed migraine cocktail, as patient has previously used with success.  I will hold suggested lithium since patient is on Lamictal.  Valium 10 mg every 8 hours sent to pharmacy #10.  In place of prednisone, patient request Kenalog injection in the office today.  Week after completion, patient will start Qulipta daily.  May take Ubrelvy 100 mg for acute migraine.    Follow-up with Dr. Oneil in May as scheduled.               Relevant Medications    lamoTRIgine (LaMICtal) 150 MG tablet    cyclobenzaprine (FLEXERIL) 10 MG tablet    naproxen (NAPROSYN) 500 MG tablet    diazePAM (Valium) 10 MG tablet    Atogepant (Qulipta) 60 MG tablet    Other Relevant Orders    CBC & Differential (Completed)    Comprehensive Metabolic Panel (Completed)    TSH (Completed)    T4, Free (Completed)    T3, Free (Completed)    C-reactive Protein (Completed)    Sedimentation Rate (Completed)    Memory loss    Overview     Neurologically intact.  I feel this may be due to ongoing migraine and/or stress.  Will continue to monitor.                  There are no Patient Instructions on file for this visit.     Plan of care reviewed with patient at the conclusion of today's visit. Education was provided regarding diagnosis, management, and any prescribed or recommended OTC medications.Patient  verbalizes understanding of and agreement with management plan.       I spent 22 minutes caring for Mildred on this date of service. This time includes time spent by me in the following activities:preparing for the visit, obtaining and/or reviewing a separately obtained history, performing a medically appropriate examination and/or evaluation , counseling and educating the patient/family/caregiver, ordering medications, tests, or procedures and documenting information in the medical record    Linn Zhou PA-C      Note: Part of this note may be an electronic transcription/translation of spoken language to printed text using the Dragon Dictation system.    Answers for HPI/ROS submitted by the patient on 4/7/2022  What is the primary reason for your visit?: Neurological Problem  altered mental status: Yes  memory loss: Yes  Chronicity: chronic  Onset: more than 1 month ago  Onset quality: suddenly  Progression since onset: waxing and waning  headaches: Yes  vertigo: Yes  Treatments tried: bed rest, eating, medication, sleep  Improvement on treatment: mild    Transcribed from ambient dictation for Linn Zhou PA-C by Kan Castillo.  04/08/22   14:20 EDT    Patient verbalized consent to the visit recording.

## 2022-04-09 LAB
BASOPHILS # BLD AUTO: 0.1 10*3/MM3 (ref 0–0.2)
BASOPHILS NFR BLD AUTO: 1.7 % (ref 0–1.5)
DEPRECATED RDW RBC AUTO: 37.8 FL (ref 37–54)
EOSINOPHIL # BLD AUTO: 0.07 10*3/MM3 (ref 0–0.4)
EOSINOPHIL NFR BLD AUTO: 1.2 % (ref 0.3–6.2)
ERYTHROCYTE [DISTWIDTH] IN BLOOD BY AUTOMATED COUNT: 14.1 % (ref 12.3–15.4)
ERYTHROCYTE [SEDIMENTATION RATE] IN BLOOD: 13 MM/HR (ref 0–20)
HCT VFR BLD AUTO: 36.6 % (ref 34–46.6)
HGB BLD-MCNC: 11.3 G/DL (ref 12–15.9)
IMM GRANULOCYTES # BLD AUTO: 0.01 10*3/MM3 (ref 0–0.05)
IMM GRANULOCYTES NFR BLD AUTO: 0.2 % (ref 0–0.5)
LYMPHOCYTES # BLD AUTO: 2.15 10*3/MM3 (ref 0.7–3.1)
LYMPHOCYTES NFR BLD AUTO: 37.1 % (ref 19.6–45.3)
MCH RBC QN AUTO: 23.3 PG (ref 26.6–33)
MCHC RBC AUTO-ENTMCNC: 30.9 G/DL (ref 31.5–35.7)
MCV RBC AUTO: 75.6 FL (ref 79–97)
MONOCYTES # BLD AUTO: 0.46 10*3/MM3 (ref 0.1–0.9)
MONOCYTES NFR BLD AUTO: 7.9 % (ref 5–12)
NEUTROPHILS NFR BLD AUTO: 3.01 10*3/MM3 (ref 1.7–7)
NEUTROPHILS NFR BLD AUTO: 51.9 % (ref 42.7–76)
NRBC BLD AUTO-RTO: 0 /100 WBC (ref 0–0.2)
PLATELET # BLD AUTO: 381 10*3/MM3 (ref 140–450)
PMV BLD AUTO: 9.9 FL (ref 6–12)
RBC # BLD AUTO: 4.84 10*6/MM3 (ref 3.77–5.28)
WBC NRBC COR # BLD: 5.8 10*3/MM3 (ref 3.4–10.8)

## 2022-04-10 DIAGNOSIS — E55.9 VITAMIN D DEFICIENCY: Primary | ICD-10-CM

## 2022-04-10 PROBLEM — R41.3 MEMORY LOSS: Status: ACTIVE | Noted: 2022-04-10

## 2022-04-10 RX ORDER — ERGOCALCIFEROL 1.25 MG/1
50000 CAPSULE ORAL WEEKLY
Qty: 4 CAPSULE | Refills: 3 | Status: SHIPPED | OUTPATIENT
Start: 2022-04-10 | End: 2023-03-17

## 2022-04-13 DIAGNOSIS — G43.109 MIGRAINE WITH AURA AND WITHOUT STATUS MIGRAINOSUS, NOT INTRACTABLE: ICD-10-CM

## 2022-04-13 RX ORDER — ATOGEPANT 60 MG/1
60 TABLET ORAL DAILY
Qty: 30 TABLET | Refills: 11 | Status: CANCELLED | OUTPATIENT
Start: 2022-04-13

## 2022-04-14 DIAGNOSIS — G43.109 MIGRAINE WITH AURA AND WITHOUT STATUS MIGRAINOSUS, NOT INTRACTABLE: ICD-10-CM

## 2022-04-14 RX ORDER — ATOGEPANT 60 MG/1
60 TABLET ORAL DAILY
Qty: 30 TABLET | Refills: 11 | Status: SHIPPED | OUTPATIENT
Start: 2022-04-14 | End: 2022-07-25

## 2022-04-14 RX ORDER — SUMATRIPTAN 100 MG/1
TABLET, FILM COATED ORAL
Qty: 18 TABLET | Refills: 1 | Status: SHIPPED | OUTPATIENT
Start: 2022-04-14 | End: 2022-06-20

## 2022-04-14 NOTE — TELEPHONE ENCOUNTER
Rx Refill Note  Requested Prescriptions     Pending Prescriptions Disp Refills   • SUMAtriptan (IMITREX) 100 MG tablet [Pharmacy Med Name: SUMATRIPTAN SUCC 100 MG TABLET] 18 tablet 1     Sig: TAKE 1 TABLET BY MOUTH AT ONSET OF HEADACHE MAY REPEAT IN 2 HOURS IF NEEDED LIMIT OF 2 PER 24 HOURS      Last office visit with prescribing clinician: 6/11/2021      Next office visit with prescribing clinician: Visit date not found            Izabel Ireland LPN  04/14/22, 12:32 EDT

## 2022-04-20 DIAGNOSIS — G43.109 MIGRAINE WITH AURA AND WITHOUT STATUS MIGRAINOSUS, NOT INTRACTABLE: ICD-10-CM

## 2022-04-20 RX ORDER — SUMATRIPTAN 100 MG/1
TABLET, FILM COATED ORAL
Qty: 18 TABLET | Refills: 1 | Status: CANCELLED | OUTPATIENT
Start: 2022-04-20

## 2022-04-20 RX ORDER — CYCLOBENZAPRINE HCL 10 MG
10 TABLET ORAL 2 TIMES DAILY PRN
Qty: 60 TABLET | Refills: 2 | Status: SHIPPED | OUTPATIENT
Start: 2022-04-20 | End: 2022-04-28

## 2022-04-28 RX ORDER — CYCLOBENZAPRINE HCL 10 MG
TABLET ORAL
Qty: 60 TABLET | Refills: 2 | Status: SHIPPED | OUTPATIENT
Start: 2022-04-28 | End: 2022-09-14

## 2022-04-28 NOTE — TELEPHONE ENCOUNTER
Rx Refill Note  Requested Prescriptions     Pending Prescriptions Disp Refills   • cyclobenzaprine (FLEXERIL) 10 MG tablet [Pharmacy Med Name: CYCLOBENZAPRINE 10 MG TABLET] 60 tablet 2     Sig: TAKE 1 TABLET BY MOUTH 2 TIMES A DAY AS NEEDED FOR MUSCLE SPASMS.      Last office visit with prescribing clinician: 6/11/2021      Next office visit with prescribing clinician: Visit date not found            Josefina Lenz RN  04/28/22, 11:34 EDT

## 2022-04-29 ENCOUNTER — TELEPHONE (OUTPATIENT)
Dept: INTERNAL MEDICINE | Facility: CLINIC | Age: 52
End: 2022-04-29

## 2022-05-04 NOTE — TELEPHONE ENCOUNTER
LVM for pt to return call Spoke with covered my meds and explained that patient insurance will not cover ubrelvy based on patient not trying any other meds. Patient would have to use discounted Rx card.

## 2022-05-24 ENCOUNTER — TELEPHONE (OUTPATIENT)
Dept: INTERNAL MEDICINE | Facility: CLINIC | Age: 52
End: 2022-05-24

## 2022-06-20 DIAGNOSIS — G43.109 MIGRAINE WITH AURA AND WITHOUT STATUS MIGRAINOSUS, NOT INTRACTABLE: ICD-10-CM

## 2022-06-20 RX ORDER — SUMATRIPTAN 100 MG/1
TABLET, FILM COATED ORAL
Qty: 18 TABLET | Refills: 1 | Status: SHIPPED | OUTPATIENT
Start: 2022-06-20 | End: 2022-08-10 | Stop reason: SDUPTHER

## 2022-06-30 RX ORDER — PROMETHAZINE HYDROCHLORIDE 25 MG/1
25 TABLET ORAL EVERY 8 HOURS PRN
Qty: 12 TABLET | Refills: 3 | Status: SHIPPED | OUTPATIENT
Start: 2022-06-30 | End: 2022-09-07 | Stop reason: SDUPTHER

## 2022-06-30 RX ORDER — FLUTICASONE PROPIONATE 50 MCG
1 SPRAY, SUSPENSION (ML) NASAL NIGHTLY
Qty: 16 ML | Refills: 5 | Status: SHIPPED | OUTPATIENT
Start: 2022-06-30 | End: 2022-12-19 | Stop reason: SDUPTHER

## 2022-06-30 NOTE — TELEPHONE ENCOUNTER
Called patient and let her know that Dr Clayton refilled her prescriptions and that she needed to come in for a physical. She has to work around the long drive to get to [place] and the fact that she has to have someone accompany her but I have her scheduled Friday 07/29/2022 at 9:30 AM as it was the only time before very late August that she could come in

## 2022-06-30 NOTE — TELEPHONE ENCOUNTER
Rx Refill Note  Requested Prescriptions     Pending Prescriptions Disp Refills   • fluticasone (FLONASE) 50 MCG/ACT nasal spray 16 mL 5     Si spray into the nostril(s) as directed by provider Every Night.      Last office visit with prescribing clinician: 22  Next office visit with prescribing clinician: Visit date not found            Yaritza Otoole LPN  22, 11:02 EDT

## 2022-06-30 NOTE — TELEPHONE ENCOUNTER
Rx Refill Note  Requested Prescriptions     Pending Prescriptions Disp Refills   • promethazine (PHENERGAN) 25 MG tablet 12 tablet 3     Sig: Take 1 tablet by mouth Every 8 (Eight) Hours As Needed for Nausea or Vomiting.      Last office visit with prescribing clinician: 6/11/2021      Next office visit with prescribing clinician: 6/30/2022            Jeanette Em MA  06/30/22, 10:12 EDT

## 2022-06-30 NOTE — TELEPHONE ENCOUNTER
Please call patient and let her know I sent in her medication.  She is due for a physical so please schedule that.

## 2022-07-25 PROBLEM — E61.1 IRON DEFICIENCY: Status: RESOLVED | Noted: 2019-01-11 | Resolved: 2022-07-25

## 2022-07-25 PROBLEM — E53.8 B12 DEFICIENCY: Status: RESOLVED | Noted: 2019-01-15 | Resolved: 2022-07-25

## 2022-07-29 ENCOUNTER — LAB (OUTPATIENT)
Dept: LAB | Facility: HOSPITAL | Age: 52
End: 2022-07-29

## 2022-07-29 ENCOUNTER — OFFICE VISIT (OUTPATIENT)
Dept: INTERNAL MEDICINE | Facility: CLINIC | Age: 52
End: 2022-07-29

## 2022-07-29 VITALS
BODY MASS INDEX: 25.16 KG/M2 | OXYGEN SATURATION: 99 % | SYSTOLIC BLOOD PRESSURE: 112 MMHG | DIASTOLIC BLOOD PRESSURE: 78 MMHG | HEIGHT: 64 IN | TEMPERATURE: 98.2 F | WEIGHT: 147.4 LBS | HEART RATE: 106 BPM

## 2022-07-29 DIAGNOSIS — R42 DIZZINESS: Chronic | ICD-10-CM

## 2022-07-29 DIAGNOSIS — E53.8 B12 DEFICIENCY: ICD-10-CM

## 2022-07-29 DIAGNOSIS — H92.01 RIGHT EAR PAIN: Chronic | ICD-10-CM

## 2022-07-29 DIAGNOSIS — E55.9 VITAMIN D DEFICIENCY: ICD-10-CM

## 2022-07-29 DIAGNOSIS — E78.00 HYPERCHOLESTEROLEMIA: Primary | ICD-10-CM

## 2022-07-29 DIAGNOSIS — G43.109 MIGRAINE WITH AURA AND WITHOUT STATUS MIGRAINOSUS, NOT INTRACTABLE: ICD-10-CM

## 2022-07-29 DIAGNOSIS — R09.81 SINUS CONGESTION: Chronic | ICD-10-CM

## 2022-07-29 DIAGNOSIS — E61.1 IRON DEFICIENCY: ICD-10-CM

## 2022-07-29 DIAGNOSIS — E66.3 OVERWEIGHT WITH BODY MASS INDEX (BMI) OF 25 TO 25.9 IN ADULT: Chronic | ICD-10-CM

## 2022-07-29 DIAGNOSIS — Z23 NEED FOR VACCINATION: ICD-10-CM

## 2022-07-29 DIAGNOSIS — F33.1 MAJOR DEPRESSIVE DISORDER, RECURRENT, MODERATE: ICD-10-CM

## 2022-07-29 DIAGNOSIS — J30.1 SEASONAL ALLERGIC RHINITIS DUE TO POLLEN: ICD-10-CM

## 2022-07-29 DIAGNOSIS — E78.00 HYPERCHOLESTEROLEMIA: ICD-10-CM

## 2022-07-29 PROBLEM — R05.9 COUGH: Chronic | Status: ACTIVE | Noted: 2022-07-29

## 2022-07-29 LAB
25(OH)D3 SERPL-MCNC: 26.3 NG/ML (ref 30–100)
ALBUMIN SERPL-MCNC: 4.7 G/DL (ref 3.5–5.2)
ALBUMIN/GLOB SERPL: 2 G/DL
ALP SERPL-CCNC: 76 U/L (ref 39–117)
ALT SERPL W P-5'-P-CCNC: 7 U/L (ref 1–33)
ANION GAP SERPL CALCULATED.3IONS-SCNC: 12 MMOL/L (ref 5–15)
ANISOCYTOSIS BLD QL: ABNORMAL
AST SERPL-CCNC: 12 U/L (ref 1–32)
BACTERIA UR QL AUTO: NORMAL /HPF
BASOPHILS # BLD MANUAL: 0.13 10*3/MM3 (ref 0–0.2)
BASOPHILS NFR BLD MANUAL: 2 % (ref 0–1.5)
BILIRUB SERPL-MCNC: <0.2 MG/DL (ref 0–1.2)
BILIRUB UR QL STRIP: NEGATIVE
BUN SERPL-MCNC: 14 MG/DL (ref 6–20)
BUN/CREAT SERPL: 19.7 (ref 7–25)
CALCIUM SPEC-SCNC: 9.1 MG/DL (ref 8.6–10.5)
CHLORIDE SERPL-SCNC: 102 MMOL/L (ref 98–107)
CHOLEST SERPL-MCNC: 185 MG/DL (ref 0–200)
CLARITY UR: ABNORMAL
CO2 SERPL-SCNC: 24 MMOL/L (ref 22–29)
COLOR UR: YELLOW
CREAT SERPL-MCNC: 0.71 MG/DL (ref 0.57–1)
DEPRECATED RDW RBC AUTO: 39.6 FL (ref 37–54)
EGFRCR SERPLBLD CKD-EPI 2021: 103.7 ML/MIN/1.73
EOSINOPHIL # BLD MANUAL: 0.06 10*3/MM3 (ref 0–0.4)
EOSINOPHIL NFR BLD MANUAL: 1 % (ref 0.3–6.2)
ERYTHROCYTE [DISTWIDTH] IN BLOOD BY AUTOMATED COUNT: 15.6 % (ref 12.3–15.4)
GLOBULIN UR ELPH-MCNC: 2.3 GM/DL
GLUCOSE SERPL-MCNC: 82 MG/DL (ref 65–99)
GLUCOSE UR STRIP-MCNC: NEGATIVE MG/DL
HCT VFR BLD AUTO: 33.5 % (ref 34–46.6)
HDLC SERPL-MCNC: 53 MG/DL (ref 40–60)
HGB BLD-MCNC: 10.1 G/DL (ref 12–15.9)
HGB UR QL STRIP.AUTO: NEGATIVE
HYALINE CASTS UR QL AUTO: NORMAL /LPF
KETONES UR QL STRIP: ABNORMAL
LDLC SERPL CALC-MCNC: 115 MG/DL (ref 0–100)
LDLC/HDLC SERPL: 2.15 {RATIO}
LEUKOCYTE ESTERASE UR QL STRIP.AUTO: NEGATIVE
LYMPHOCYTES # BLD MANUAL: 2.08 10*3/MM3 (ref 0.7–3.1)
LYMPHOCYTES NFR BLD MANUAL: 5.1 % (ref 5–12)
MCH RBC QN AUTO: 21.8 PG (ref 26.6–33)
MCHC RBC AUTO-ENTMCNC: 30.1 G/DL (ref 31.5–35.7)
MCV RBC AUTO: 72.2 FL (ref 79–97)
MICROCYTES BLD QL: ABNORMAL
MONOCYTES # BLD: 0.32 10*3/MM3 (ref 0.1–0.9)
NEUTROPHILS # BLD AUTO: 3.77 10*3/MM3 (ref 1.7–7)
NEUTROPHILS NFR BLD MANUAL: 59.2 % (ref 42.7–76)
NITRITE UR QL STRIP: NEGATIVE
NRBC BLD AUTO-RTO: 0 /100 WBC (ref 0–0.2)
OVALOCYTES BLD QL SMEAR: ABNORMAL
PH UR STRIP.AUTO: 6 [PH] (ref 5–8)
PLAT MORPH BLD: NORMAL
PLATELET # BLD AUTO: 346 10*3/MM3 (ref 140–450)
PMV BLD AUTO: 9.6 FL (ref 6–12)
POIKILOCYTOSIS BLD QL SMEAR: ABNORMAL
POTASSIUM SERPL-SCNC: 3.9 MMOL/L (ref 3.5–5.2)
PROT SERPL-MCNC: 7 G/DL (ref 6–8.5)
PROT UR QL STRIP: ABNORMAL
RBC # BLD AUTO: 4.64 10*6/MM3 (ref 3.77–5.28)
RBC # UR STRIP: NORMAL /HPF
REF LAB TEST METHOD: NORMAL
SODIUM SERPL-SCNC: 138 MMOL/L (ref 136–145)
SP GR UR STRIP: 1.02 (ref 1–1.03)
SQUAMOUS #/AREA URNS HPF: NORMAL /HPF
TRIGL SERPL-MCNC: 91 MG/DL (ref 0–150)
TSH SERPL DL<=0.05 MIU/L-ACNC: 2.36 UIU/ML (ref 0.27–4.2)
UROBILINOGEN UR QL STRIP: ABNORMAL
VARIANT LYMPHS NFR BLD MANUAL: 32.7 % (ref 19.6–45.3)
VLDLC SERPL-MCNC: 17 MG/DL (ref 5–40)
WBC # UR STRIP: NORMAL /HPF
WBC MORPH BLD: NORMAL
WBC NRBC COR # BLD: 6.36 10*3/MM3 (ref 3.4–10.8)

## 2022-07-29 PROCEDURE — 81001 URINALYSIS AUTO W/SCOPE: CPT

## 2022-07-29 PROCEDURE — 82306 VITAMIN D 25 HYDROXY: CPT

## 2022-07-29 PROCEDURE — 90471 IMMUNIZATION ADMIN: CPT | Performed by: INTERNAL MEDICINE

## 2022-07-29 PROCEDURE — 90715 TDAP VACCINE 7 YRS/> IM: CPT | Performed by: INTERNAL MEDICINE

## 2022-07-29 PROCEDURE — 80061 LIPID PANEL: CPT

## 2022-07-29 PROCEDURE — 85007 BL SMEAR W/DIFF WBC COUNT: CPT

## 2022-07-29 PROCEDURE — 99396 PREV VISIT EST AGE 40-64: CPT | Performed by: INTERNAL MEDICINE

## 2022-07-29 PROCEDURE — 80050 GENERAL HEALTH PANEL: CPT

## 2022-07-29 RX ORDER — GALCANEZUMAB 120 MG/ML
INJECTION, SOLUTION SUBCUTANEOUS
COMMUNITY
Start: 2022-07-06 | End: 2023-01-24 | Stop reason: SDUPTHER

## 2022-07-29 RX ORDER — OMEPRAZOLE 40 MG/1
40 CAPSULE, DELAYED RELEASE ORAL DAILY
Qty: 30 CAPSULE | Refills: 5 | Status: SHIPPED | OUTPATIENT
Start: 2022-07-29 | End: 2023-01-27

## 2022-07-29 NOTE — PROGRESS NOTES
Preventative Annual Visit    Mildred French  1970   [id number]    Patient Care Team:  Roula Clayton MD as PCP - General (Internal Medicine)  Reinaldo Oneil MD as Consulting Physician (Neurology)  Reinaldo Oneil MD as Consulting Physician (Neurology)    Chief Complaint::   Chief Complaint   Patient presents with   • Annual Exam   • Migraine        Subjective   History of Present Illness    Mildred French is a 52 y.o. female who presents for an Annual Wellness Visit.    Migraines  The patient states that she was seen by NNEKA Watt, in 04/2022, for migraines. She was prescribed a medication, atogepant daily for prevention and abrogepant as needed for acute headaches.. However, she reports that she was unable to take one of the medications because it made her dizzy, and it did not provide relief. The patient adds that her migraines were so severe that she went to see neurologist, Reinaldo Oneil MD, and [place] where she obtained 2 injections on the posterior aspect of her neck which may have been a steroid and numbing agent. She states that she was also given an Emgality injection which caused her to feel ill at first. Subsequently however, her migraines began to improve.  She has now had 3 Emgality monthly injections.  Headaches are definitely improving.  She reports that her neurologist ordered an MRI scan on 05/09/2022, although it has not yet been scheduled.     Today, the patient states that her migraines have improved as they have decreased in frequency, and her migraine medication is now able to provide relief. At this time, she takes Imitrex and naproxen for any acute migraines with no complaints of side effects. She notes that her migraines are sometimes in in and around her right ear. The patient recalls that in 05/2022, she had an acute onset of sharp pain in her head that felt like a gunshot which she had never experienced previously. She states that she would like to continue  "receiving the Emgality injection once per month even though one of the side effects is \"headaches\". She states that ibuprofen alleviates these mild headaches. The patient reports receiving Emgality injections in 05/2022, 06/2022, and 07/2022.  She adds that the next treatment option consists of Botox injections.     Vertigo   The patient reports intermittent symptoms of vertigo when standing quickly. She states that she went for an evaluation at a local clinic where she was told that her ears were normal, although she feels \"popping and cracking.\" The patient describes her vertigo symptoms as a \"sick\" feeling with a \"vice\" on her head. She denies noticing increased vertigo symptoms on one side versus the other.  She notes that she was told that one can have \"crystals\" in the head, and she has performed Epley maneuvers on herself after reading about it on the Internet  which has helped. The patient states that she recently obtained new glasses, and due to the adjustment, she is having dizziness when turning her head quickly. She denies any dizziness with turning over in bed at night however. She also denies taking Mucinex, although she has not tried autoinflation in the past. The patient notes that she uses a fluticasone nasal spray sparingly as it tends to worsen her migraines if she uses it ongoing.  However, when she uses it on and off as needed it seems to help a lot.    Left ear pain  The patient states that she is having pain/pressure  in her left ear today.     Cough  The patient states that she developed a slight cough and lost her voice 2 days ago. She reports that her voice is gradually returning. She adds that she took a COVID-19 test 2 days ago as well as this morning, and both were negative. The patient states that she took some ibuprofen to help alleviate her symptoms, but she has not needed to take any other medications.     Bleeding  The patient states that she saw her gynecologist last Friday, " 07/22/2022, who ordered a vaginal ultrasound for bleeding of unknown origin. Her ultrasound is scheduled for 08/08/2022, and she obtained laboratory work for her thyroid and blood counts this morning.    Gastroesophageal reflux  The patient states that she is having increased gastroesophageal reflux at night. She reports that she has increased her famotidine dosage from 1 dose in the morning and 1 dose at night to 1 dose in the morning to 2 at night. She has noticed that if she discontinues eating after a certain time, her symptoms improve.     Depression and anxiety  The patient reports that her son passed away 3 years ago, and her feelings fluctuate between having good and bad days.     Overweight   The patient states that was eating a high-protein, low-carbohydrate diet for approximately 1.5 years. However, she is not eating this way anymore, although she denies any weight gain. She denies exercising regularly at this time. Her blood pressure is 112/78 mmHg today.    Health maintenance  The patient is up-to-date on her colonoscopy as she has obtained one within the past 10 years. She is also up-to-date on her COVID-19 vaccinations. She is unsure if she has received the second hepatitis A vaccine, and she is due for her tetanus vaccination. The patient is open to receiving the tetanus vaccination today. She is interested in the shingles vaccine as well.     CHRONIC CONDITIONS    Patient Active Problem List   Diagnosis   • Primary insomnia   • Vitamin D deficiency   • Goiter, nontoxic, multinodular   • Cervicalgia   • Myalgia   • Major depressive disorder, recurrent, moderate (HCC)   • Generalized anxiety disorder   • Hair loss   • Screening mammogram, encounter for   • Irregular periods   • Seasonal allergic rhinitis due to pollen   • Other fatigue   • Migraine with aura and without status migrainosus, not intractable   • Slow transit constipation   • Obstructive sleep apnea   • Back pain   • Overweight with body  mass index (BMI) of 25 to 25.9 in adult   • Right hip pain   • Chest pain, atypical   • Arthralgia of multiple sites   • Sinus congestion   • Hypercholesterolemia   • Annual physical exam   • Memory loss   • Cough   • Right ear pain   • Dizziness        Past Medical History:   Diagnosis Date   • Anxiety    • B12 deficiency 1/15/2019    Recheck today   • Bipolar II disorder (HCC)    • Fibromyalgia    • GERD (gastroesophageal reflux disease)    • Goiter    • Iron deficiency 2019 Roula Clayton MD  Continue taking 2 of the [place] vitamins with iron daily.   • Major depressive disorder    • Migraines     4-5 times a week    • Scoliosis    • Sleep apnea        Past Surgical History:   Procedure Laterality Date   • BARIATRIC SURGERY     •  SECTION  1997   •  SECTION  1988   • TONSILLECTOMY     • WISDOM TOOTH EXTRACTION         Family History   Problem Relation Age of Onset   • Thyroid disease Mother    • Hypertension Mother    • Hyperlipidemia Mother    • Obesity Mother    • Depression Mother    • Obesity Sister    • Depression Sister    • Thyroid disease Sister    • Thyroid disease Maternal Aunt    • Coronary artery disease Maternal Grandmother    • Thyroid disease Father    • Diabetes Other    • Sudden death Son 22        Brugada syn   • Heart defect Son 21        Positive for Brugada syndrome.  No structural heart disease.  Asymptomatic   • Breast cancer Neg Hx        Social History     Socioeconomic History   • Marital status:    Tobacco Use   • Smoking status: Never Smoker   • Smokeless tobacco: Never Used   Substance and Sexual Activity   • Alcohol use: No   • Drug use: No       Allergies   Allergen Reactions   • Atenolol Other (See Comments)     Suicidal    • Hydrocodone Unknown (See Comments)     Patient is not aware of a reaction to this drug    • Topamax [Topiramate] Anxiety         Current Outpatient Medications:   •  ALPRAZolam (XANAX) 1 MG  tablet, TAKE 1 TABLET BY MOUTH EVERY DAY ** MAY REPEAT 1 TABLET FOR ANXIETY **, Disp: , Rfl: 1  •  ALPRAZolam XR (XANAX XR) 1 MG 24 hr tablet, Take 1 mg by mouth Every Morning., Disp: , Rfl:   •  cyclobenzaprine (FLEXERIL) 10 MG tablet, TAKE 1 TABLET BY MOUTH 2 TIMES A DAY AS NEEDED FOR MUSCLE SPASMS., Disp: 60 tablet, Rfl: 2  •  diazePAM (Valium) 10 MG tablet, Take 1 tablet by mouth Every 8 (Eight) Hours As Needed (migraine)., Disp: 10 tablet, Rfl: 0  •  Emgality 120 MG/ML solution prefilled syringe, INJECT ONE PREFILLED SYRINGE MONTHLY, Disp: , Rfl:   •  famotidine (PEPCID) 40 MG tablet, Take 1 tablet by mouth 2 (Two) Times a Day., Disp: 180 tablet, Rfl: 1  •  fluconazole (DIFLUCAN) 150 MG tablet, Take 1 tablet by mouth Daily As Needed (yeast vaginitis)., Disp: 3 tablet, Rfl: 0  •  fluticasone (FLONASE) 50 MCG/ACT nasal spray, 1 spray into the nostril(s) as directed by provider Every Night., Disp: 16 mL, Rfl: 5  •  lamoTRIgine (LaMICtal) 150 MG tablet, Take 300 mg by mouth Every Morning., Disp: , Rfl: 1  •  naproxen (NAPROSYN) 500 MG tablet, TAKE 1 TABLET BY MOUTH TWICE A DAY AS NEEDED FOR HEADACHE TAKE WITH FOOD, Disp: 60 tablet, Rfl: 4  •  polyethylene glycol (GlycoLax) 17 GM/SCOOP powder, Take 17 g by mouth Daily., Disp: 850 g, Rfl: 5  •  promethazine (PHENERGAN) 25 MG tablet, Take 1 tablet by mouth Every 8 (Eight) Hours As Needed for Nausea or Vomiting., Disp: 12 tablet, Rfl: 3  •  SUMAtriptan (IMITREX) 100 MG tablet, TAKE 1 TABLET BY MOUTH AT ONSET OF HEADACHE MAY REPEAT IN 2 HOURS IF NEEDED LIMIT OF 2 PER 24 HOURS, Disp: 18 tablet, Rfl: 1  •  vitamin D (ERGOCALCIFEROL) 1.25 MG (58790 UT) capsule capsule, Take 1 capsule by mouth 1 (One) Time Per Week., Disp: 4 capsule, Rfl: 3  •  VYVANSE 70 MG capsule, Take 70 mg by mouth Every Morning  , Disp: , Rfl:   •  ziprasidone (GEODON) 80 MG capsule, Take 80 mg by mouth 2 (Two) Times a Day., Disp: , Rfl: 1  •  omeprazole (priLOSEC) 40 MG capsule, Take 1 capsule by  "mouth Daily., Disp: 30 capsule, Rfl: 5    Immunization History   Administered Date(s) Administered   • COVID-19 (PFIZER) PURPLE CAP 07/20/2021, 08/12/2021   • Flu Vaccine Quad PF >36MO 10/06/2017   • Hepatitis A 04/08/2019   • Influenza, Unspecified 10/06/2017, 10/11/2021   • Tdap 02/21/2012, 07/29/2022   • flucelvax quad pfs =>4 YRS 09/20/2019        Health Maintenance Due   Topic Date Due   • COLORECTAL CANCER SCREENING  Never done   • HEPATITIS C SCREENING  Never done   • COVID-19 Vaccine (3 - Booster for Pfizer series) 01/12/2022   • ZOSTER VACCINE (1 of 2) Never done   • ANNUAL PHYSICAL  06/12/2022        Review of Systems   Constitutional: Negative for chills, fatigue and fever.   HENT: Negative for congestion, ear pain and sinus pressure.    Respiratory: Negative for cough, chest tightness, shortness of breath and wheezing.    Cardiovascular: Negative for chest pain and palpitations.   Gastrointestinal: Negative for abdominal pain, blood in stool and constipation.   Skin: Negative for color change.   Allergic/Immunologic: Negative for environmental allergies.   Neurological: Negative for dizziness, speech difficulty and headache.   Psychiatric/Behavioral: Negative for decreased concentration. The patient is not nervous/anxious.         Vital Signs  Vitals:    07/29/22 0907   BP: 112/78   BP Location: Left arm   Patient Position: Sitting   Cuff Size: Adult   Pulse: 106   Temp: 98.2 °F (36.8 °C)   TempSrc: Infrared   SpO2: 99%   Weight: 66.9 kg (147 lb 6.4 oz)   Height: 162.6 cm (64.02\")   PainSc: 0-No pain     BMI is >= 25 and <30. (Overweight) The following options were offered after discussion;: weight loss educational material (shared in after visit summary), exercise counseling/recommendations and nutrition counseling/recommendations    Physical Exam  Vitals and nursing note reviewed.   Constitutional:       Appearance: She is well-developed.      Comments: Mildly overweight.   HENT:      Head: " Normocephalic.      Right Ear: Tympanic membrane and ear canal normal.      Left Ear: Tympanic membrane and ear canal normal.   Eyes:      Conjunctiva/sclera: Conjunctivae normal.      Pupils: Pupils are equal, round, and reactive to light.   Neck:      Thyroid: No thyromegaly.   Cardiovascular:      Rate and Rhythm: Normal rate and regular rhythm.      Heart sounds: Normal heart sounds.   Pulmonary:      Effort: Pulmonary effort is normal.      Breath sounds: Normal breath sounds. No wheezing.   Abdominal:      General: Bowel sounds are normal.      Palpations: Abdomen is soft.      Tenderness: There is no abdominal tenderness.   Musculoskeletal:         General: No tenderness. Normal range of motion.      Cervical back: Normal range of motion and neck supple.   Lymphadenopathy:      Cervical: No cervical adenopathy.   Skin:     General: Skin is warm and dry.      Findings: No rash.   Neurological:      Mental Status: She is alert and oriented to person, place, and time.      Cranial Nerves: No cranial nerve deficit.      Sensory: No sensory deficit.      Coordination: Coordination normal.      Gait: Gait normal.   Psychiatric:         Speech: Speech normal.         Behavior: Behavior normal.         Thought Content: Thought content normal.         Judgment: Judgment normal.          Procedures     Fall Risk Screen:  [facility].    Health Habits and Functional and Cognitive Screening:  No flowsheet data found.    Smoking Status:  Social History     Tobacco Use   Smoking Status Never Smoker   Smokeless Tobacco Never Used       Alcohol Consumption:  Social History     Substance and Sexual Activity   Alcohol Use No       Depression [place]  PHQ-9:    PHQ-2/PHQ-9 Depression Screening 7/29/2022   Retired PHQ-9 Total Score -   Retired Total Score -   Little Interest or Pleasure in Doing Things 0-->not at all   Feeling Down, Depressed or Hopeless 0-->not at all   PHQ-9: Brief  Depression Severity Measure Score 0      Patient's depression is single episode and is mild without psychosis. Their depression is currently in partial remission and the condition is improving with treatment. This will be reassessed at the next regular appointment. F/U as described:patient will continue current medication therapy.     ACE III MINI        Labs  Results for orders placed or performed in visit on 07/29/22   Comprehensive Metabolic Panel    Specimen: Blood   Result Value Ref Range    Glucose 82 65 - 99 mg/dL    BUN 14 6 - 20 mg/dL    Creatinine 0.71 0.57 - 1.00 mg/dL    Sodium 138 136 - 145 mmol/L    Potassium 3.9 3.5 - 5.2 mmol/L    Chloride 102 98 - 107 mmol/L    CO2 24.0 22.0 - 29.0 mmol/L    Calcium 9.1 8.6 - 10.5 mg/dL    Total Protein 7.0 6.0 - 8.5 g/dL    Albumin 4.70 3.50 - 5.20 g/dL    ALT (SGPT) 7 1 - 33 U/L    AST (SGOT) 12 1 - 32 U/L    Alkaline Phosphatase 76 39 - 117 U/L    Total Bilirubin <0.2 0.0 - 1.2 mg/dL    Globulin 2.3 gm/dL    A/G Ratio 2.0 g/dL    BUN/Creatinine Ratio 19.7 7.0 - 25.0    Anion Gap 12.0 5.0 - 15.0 mmol/L    eGFR 103.7 >60.0 mL/min/1.73   Lipid Panel    Specimen: Blood   Result Value Ref Range    Total Cholesterol 185 0 - 200 mg/dL    Triglycerides 91 0 - 150 mg/dL    HDL Cholesterol 53 40 - 60 mg/dL    LDL Cholesterol  115 (H) 0 - 100 mg/dL    VLDL Cholesterol 17 5 - 40 mg/dL    LDL/HDL Ratio 2.15    TSH    Specimen: Blood   Result Value Ref Range    TSH 2.360 0.270 - 4.200 uIU/mL   Vitamin D 25 Hydroxy    Specimen: Blood   Result Value Ref Range    25 Hydroxy, Vitamin D 26.3 (L) 30.0 - 100.0 ng/ml   CBC Auto Differential    Specimen: Blood   Result Value Ref Range    WBC 6.36 3.40 - 10.80 10*3/mm3    RBC 4.64 3.77 - 5.28 10*6/mm3    Hemoglobin 10.1 (L) 12.0 - 15.9 g/dL    Hematocrit 33.5 (L) 34.0 - 46.6 %    MCV 72.2 (L) 79.0 - 97.0 fL    MCH 21.8 (L) 26.6 - 33.0 pg    MCHC 30.1 (L) 31.5 - 35.7 g/dL    RDW 15.6 (H) 12.3 - 15.4 %    RDW-SD 39.6 37.0 - 54.0 fl    MPV  9.6 6.0 - 12.0 fL    Platelets 346 140 - 450 10*3/mm3    nRBC 0.0 0.0 - 0.2 /100 WBC   Urinalysis without microscopic (no culture) - Urine, Clean Catch    Specimen: Urine, Clean Catch   Result Value Ref Range    Color, UA Yellow Yellow, Straw    Appearance, UA Cloudy (A) Clear    pH, UA 6.0 5.0 - 8.0    Specific Gravity, UA 1.020 1.005 - 1.030    Glucose, UA Negative Negative    Ketones, UA Trace (A) Negative    Bilirubin, UA Negative Negative    Blood, UA Negative Negative    Protein, UA Trace (A) Negative    Leuk Esterase, UA Negative Negative    Nitrite, UA Negative Negative    Urobilinogen, UA 1.0 E.U./dL 0.2 - 1.0 E.U./dL   Urinalysis, Microscopic Only - Urine, Clean Catch    Specimen: Urine, Clean Catch   Result Value Ref Range    RBC, UA 0-2 None Seen, 0-2 /HPF    WBC, UA None Seen None Seen, 0-2 /HPF    Bacteria, UA None Seen None Seen /HPF    Squamous Epithelial Cells, UA 0-2 None Seen, 0-2 /HPF    Hyaline Casts, UA None Seen None Seen /LPF    Methodology Manual Light Microscopy    Manual Differential    Specimen: Blood   Result Value Ref Range    Neutrophil % 59.2 42.7 - 76.0 %    Lymphocyte % 32.7 19.6 - 45.3 %    Monocyte % 5.1 5.0 - 12.0 %    Eosinophil % 1.0 0.3 - 6.2 %    Basophil % 2.0 (H) 0.0 - 1.5 %    Neutrophils Absolute 3.77 1.70 - 7.00 10*3/mm3    Lymphocytes Absolute 2.08 0.70 - 3.10 10*3/mm3    Monocytes Absolute 0.32 0.10 - 0.90 10*3/mm3    Eosinophils Absolute 0.06 0.00 - 0.40 10*3/mm3    Basophils Absolute 0.13 0.00 - 0.20 10*3/mm3    Anisocytosis Mod/2+ None Seen    Microcytes Mod/2+ None Seen    Ovalocytes Slight/1+ None Seen    Poikilocytes Slight/1+ None Seen    WBC Morphology Normal Normal    Platelet Morphology Normal Normal        Assessment & Plan     Patient Self-Management and Personalized Health Advice    The patient has been provided counseling and guidance about: diet, exercise, weight management and prevention of cardiac or vascular disease and preventive services including:    · Annual Wellness Visit (AWV)  · Bone Density Measurements.  Patient Instructions   Problem List Items Addressed This Visit        Allergies and Adverse Reactions    Seasonal allergic rhinitis due to pollen    Overview      fluticasone twice a day as needed for allergies.         Current Assessment & Plan     Continue fluticasone nasal spray as needed.         Relevant Medications    naproxen (NAPROSYN) 500 MG tablet       Cardiac and Vasculature    Hypercholesterolemia - Primary    Current Assessment & Plan     Continue to decrease sugars and fats in the diet.   Increase exercise and walking.         Relevant Orders    Comprehensive Metabolic Panel (Completed)    Lipid Panel (Completed)    TSH (Completed)    Urinalysis With Microscopic - Urine, Clean Catch (Completed)       ENT    Sinus congestion (Chronic)    Right ear pain (Chronic)    Current Assessment & Plan     Use the fluticasone nasal spray.   May also use Mucinex twice daily as needed.            Endocrine and Metabolic    RESOLVED: B12 deficiency    Overview     Recheck today         Vitamin D deficiency    Current Assessment & Plan     Checking labs today.         Relevant Medications    vitamin D (ERGOCALCIFEROL) 1.25 MG (61820 UT) capsule capsule    Other Relevant Orders    Vitamin D 25 Hydroxy (Completed)       Hematology and Neoplasia    RESOLVED: Iron deficiency    Overview     6/11/2021 Roula Clayton MD    Continue taking 2 of the [place] vitamins with iron daily.         Relevant Orders    CBC & Differential (Completed)       Mental Health    Major depressive disorder, recurrent, moderate (HCC)    Overview     Geodon, lamotrigine, Xanax, and Vyvanse.  Regular follow-up with the psychiatrist.             Current Assessment & Plan     Continue current medications and regular follow-up with the psychiatrist and counselor.         Relevant Medications    ziprasidone (GEODON) 80 MG capsule    VYVANSE 70 MG capsule    ALPRAZolam XR (XANAX XR) 1  MG 24 hr tablet    ALPRAZolam (XANAX) 1 MG tablet    diazePAM (Valium) 10 MG tablet       Neuro    Migraine with aura and without status migrainosus, not intractable    Overview     Seeing Dr. Oneil in May as scheduled. Had 3 emgality injections.  Using sumatriptan and naproxen as needed for acute headaches.             Current Assessment & Plan     Continue Emgality injections and regular follow-up with Dr. Oneil.   Continue sumatriptan with naproxen for acute headaches as needed.             Relevant Medications    lamoTRIgine (LaMICtal) 150 MG tablet    naproxen (NAPROSYN) 500 MG tablet    diazePAM (Valium) 10 MG tablet    cyclobenzaprine (FLEXERIL) 10 MG tablet    SUMAtriptan (IMITREX) 100 MG tablet    Emgality 120 MG/ML solution prefilled syringe       Symptoms and Signs    Dizziness (Chronic)    Current Assessment & Plan     She will continue the Epley maneuvers at home as needed.            Other    Overweight with body mass index (BMI) of 25 to 25.9 in adult (Chronic)    Current Assessment & Plan     She will continue to improve low-fat, healthy diet.   Increase regular exercise and walking.           Other Visit Diagnoses     Need for vaccination        Relevant Orders    Tdap Vaccine Greater Than or Equal To 6yo IM (Completed)             Diagnosis Plan   1. Hypercholesterolemia  Comprehensive Metabolic Panel    Lipid Panel    TSH    Urinalysis With Microscopic - Urine, Clean Catch   2. Right ear pain     3. Sinus congestion     4. Seasonal allergic rhinitis due to pollen     5. Dizziness     6. Migraine with aura and without status migrainosus, not intractable     7. Overweight with body mass index (BMI) of 25 to 25.9 in adult     8. Major depressive disorder, recurrent, moderate (HCC)     9. Vitamin D deficiency  Vitamin D 25 Hydroxy   10. B12 deficiency     11. Iron deficiency  CBC & Differential   12. Need for vaccination  Tdap Vaccine Greater Than or Equal To 6yo IM       Outpatient Encounter  Medications as of 7/29/2022   Medication Sig Dispense Refill   • ALPRAZolam (XANAX) 1 MG tablet TAKE 1 TABLET BY MOUTH EVERY DAY ** MAY REPEAT 1 TABLET FOR ANXIETY **  1   • ALPRAZolam XR (XANAX XR) 1 MG 24 hr tablet Take 1 mg by mouth Every Morning.     • cyclobenzaprine (FLEXERIL) 10 MG tablet TAKE 1 TABLET BY MOUTH 2 TIMES A DAY AS NEEDED FOR MUSCLE SPASMS. 60 tablet 2   • diazePAM (Valium) 10 MG tablet Take 1 tablet by mouth Every 8 (Eight) Hours As Needed (migraine). 10 tablet 0   • Emgality 120 MG/ML solution prefilled syringe INJECT ONE PREFILLED SYRINGE MONTHLY     • famotidine (PEPCID) 40 MG tablet Take 1 tablet by mouth 2 (Two) Times a Day. 180 tablet 1   • fluconazole (DIFLUCAN) 150 MG tablet Take 1 tablet by mouth Daily As Needed (yeast vaginitis). 3 tablet 0   • fluticasone (FLONASE) 50 MCG/ACT nasal spray 1 spray into the nostril(s) as directed by provider Every Night. 16 mL 5   • lamoTRIgine (LaMICtal) 150 MG tablet Take 300 mg by mouth Every Morning.  1   • naproxen (NAPROSYN) 500 MG tablet TAKE 1 TABLET BY MOUTH TWICE A DAY AS NEEDED FOR HEADACHE TAKE WITH FOOD 60 tablet 4   • polyethylene glycol (GlycoLax) 17 GM/SCOOP powder Take 17 g by mouth Daily. 850 g 5   • promethazine (PHENERGAN) 25 MG tablet Take 1 tablet by mouth Every 8 (Eight) Hours As Needed for Nausea or Vomiting. 12 tablet 3   • SUMAtriptan (IMITREX) 100 MG tablet TAKE 1 TABLET BY MOUTH AT ONSET OF HEADACHE MAY REPEAT IN 2 HOURS IF NEEDED LIMIT OF 2 PER 24 HOURS 18 tablet 1   • vitamin D (ERGOCALCIFEROL) 1.25 MG (94366 UT) capsule capsule Take 1 capsule by mouth 1 (One) Time Per Week. 4 capsule 3   • VYVANSE 70 MG capsule Take 70 mg by mouth Every Morning       • ziprasidone (GEODON) 80 MG capsule Take 80 mg by mouth 2 (Two) Times a Day.  1   • omeprazole (priLOSEC) 40 MG capsule Take 1 capsule by mouth Daily. 30 capsule 5   • [DISCONTINUED] Atogepant (Qulipta) 60 MG tablet Take 1 tablet by mouth Daily. 30 tablet 11     No  facility-administered encounter medications on file as of 7/29/2022.         Age appropriate preventive counseling done including age appropriate vaccines,regular  Mammogram and self breast exam, pap smear, colonoscopy, regular dental visits, mental health, injury prevention such as wearing seat belt and preventing falls, healthy  nutrition, healthy weight, regular physical exercise. Alcohol use is moderate.  Tobacco history-none. Drug use-none.  STD's-not at risk.    Follow Up:  Return in about 6 months (around 1/29/2023) for Recheck.         An After Visit Summary and PPPS with all of these plans were given to the patient.      Note: Part of this note may be an electronic transcription/translation of spoken language to printed text using the Dragon Dictation System.     Roula Clayton MD     Transcribed from ambient dictation for Roula Clayton MD by OLEGARIO MICHAELS.  07/29/22   12:39 EDT    Patient verbalized consent to the visit recording.  I have personally performed the services described in this document as transcribed by the above individual, and it is both accurate and complete.  Roula Clayton MD  7/29/2022  16:12 EDT

## 2022-07-29 NOTE — PATIENT INSTRUCTIONS
Patient Instructions   Problem List Items Addressed This Visit          Allergies and Adverse Reactions    Seasonal allergic rhinitis due to pollen    Overview      fluticasone twice a day as needed for allergies.         Current Assessment & Plan     Continue fluticasone nasal spray as needed.         Relevant Medications    naproxen (NAPROSYN) 500 MG tablet       Cardiac and Vasculature    Hypercholesterolemia - Primary    Current Assessment & Plan     Continue to decrease sugars and fats in the diet.   Increase exercise and walking.         Relevant Orders    Comprehensive Metabolic Panel (Completed)    Lipid Panel (Completed)    TSH (Completed)    Urinalysis With Microscopic - Urine, Clean Catch (Completed)       ENT    Sinus congestion (Chronic)    Right ear pain (Chronic)    Current Assessment & Plan     Use the fluticasone nasal spray.   May also use Mucinex twice daily as needed.            Endocrine and Metabolic    RESOLVED: B12 deficiency    Overview     Recheck today         Vitamin D deficiency    Current Assessment & Plan     Checking labs today.         Relevant Medications    vitamin D (ERGOCALCIFEROL) 1.25 MG (38344 UT) capsule capsule    Other Relevant Orders    Vitamin D 25 Hydroxy (Completed)       Hematology and Neoplasia    RESOLVED: Iron deficiency    Overview     6/11/2021 Roula Clayton MD    Continue taking 2 of the [place] vitamins with iron daily.         Relevant Orders    CBC & Differential (Completed)       Mental Health    Major depressive disorder, recurrent, moderate (HCC)    Overview     Geodon, lamotrigine, Xanax, and Vyvanse.  Regular follow-up with the psychiatrist.             Current Assessment & Plan     Continue current medications and regular follow-up with the psychiatrist and counselor.         Relevant Medications    ziprasidone (GEODON) 80 MG capsule    VYVANSE 70 MG capsule    ALPRAZolam XR (XANAX XR) 1 MG 24 hr tablet    ALPRAZolam (XANAX) 1 MG tablet     diazePAM (Valium) 10 MG tablet       Neuro    Migraine with aura and without status migrainosus, not intractable    Overview     Seeing Dr. Oneil in May as scheduled. Had 3 emgality injections.  Using sumatriptan and naproxen as needed for acute headaches.             Current Assessment & Plan     Continue Emgality injections and regular follow-up with Dr. Oneil.   Continue sumatriptan with naproxen for acute headaches as needed.             Relevant Medications    lamoTRIgine (LaMICtal) 150 MG tablet    naproxen (NAPROSYN) 500 MG tablet    diazePAM (Valium) 10 MG tablet    cyclobenzaprine (FLEXERIL) 10 MG tablet    SUMAtriptan (IMITREX) 100 MG tablet    Emgality 120 MG/ML solution prefilled syringe       Symptoms and Signs    Dizziness (Chronic)    Current Assessment & Plan     She will continue the Epley maneuvers at home as needed.            Other    Overweight with body mass index (BMI) of 25 to 25.9 in adult (Chronic)    Current Assessment & Plan     She will continue to improve low-fat, healthy diet.   Increase regular exercise and walking.               Other Visit Diagnoses       Need for vaccination        Relevant Orders    Tdap Vaccine Greater Than or Equal To 8yo IM (Completed)          BMI for Adults  What is BMI?  Body mass index (BMI) is a number that is calculated from a person's weight and height. BMI can help estimate how much of a person's weight is composed of fat. BMI does not measure body fat directly. Rather, it is an alternative to procedures that directly measure body fat, which can be difficult and expensive.  BMI can help identify people who may be at higher risk for certain medical problems.  What are BMI measurements used for?  BMI is used as a screening tool to identify possible weight problems. It helps determine whether a person is obese, overweight, a healthy weight, or underweight.  BMI is useful for:  Identifying a weight problem that may be related to a medical condition or may  "increase the risk for medical problems.  Promoting changes, such as changes in diet and exercise, to help reach a healthy weight. BMI screening can be repeated to see if these changes are working.  How is BMI calculated?  BMI involves measuring your weight in relation to your height. Both height and weight are measured, and the BMI is calculated from those numbers. This can be done either in English (U.S.) or metric measurements. Note that charts and online BMI calculators are available to help you find your BMI quickly and easily without having to do these calculations yourself.  To calculate your BMI in English (U.S.) measurements:    Measure your weight in pounds (lb).  Multiply the number of pounds by 703.  For example, for a person who weighs 180 lb, multiply that number by 703, which equals 126,540.  Measure your height in inches. Then multiply that number by itself to get a measurement called \"inches squared.\"  For example, for a person who is 70 inches tall, the \"inches squared\" measurement is 70 inches x 70 inches, which equals 4,900 inches squared.  Divide the total from step 2 (number of lb x 703) by the total from step 3 (inches squared): 126,540 ÷ 4,900 = 25.8. This is your BMI.    To calculate your BMI in metric measurements:  Measure your weight in kilograms (kg).  Measure your height in meters (m). Then multiply that number by itself to get a measurement called \"meters squared.\"  For example, for a person who is 1.75 m tall, the \"meters squared\" measurement is 1.75 m x 1.75 m, which is equal to 3.1 meters squared.  Divide the number of kilograms (your weight) by the meters squared number. In this example: 70 ÷ 3.1 = 22.6. This is your BMI.  What do the results mean?  BMI charts are used to identify whether you are underweight, normal weight, overweight, or obese. The following guidelines will be used:  Underweight: BMI less than 18.5.  Normal weight: BMI between 18.5 and 24.9.  Overweight: BMI " between 25 and 29.9.  Obese: BMI of 30 or above.  Keep these notes in mind:  Weight includes both fat and muscle, so someone with a muscular build, such as an athlete, may have a BMI that is higher than 24.9. In cases like these, BMI is not an accurate measure of body fat.  To determine if excess body fat is the cause of a BMI of 25 or higher, further assessments may need to be done by a health care provider.  BMI is usually interpreted in the same way for men and women.  Where to find more information  For more information about BMI, including tools to quickly calculate your BMI, go to these websites:  Centers for Disease Control and Prevention: www.cdc.gov  American Heart Association: www.heart.org  National Heart, Lung, and Blood [place]: www.nhlbi.nih.gov  Summary  Body mass index (BMI) is a number that is calculated from a person's weight and height.  BMI may help estimate how much of a person's weight is composed of fat. BMI can help identify those who may be at higher risk for certain medical problems.  BMI can be measured using English measurements or metric measurements.  BMI charts are used to identify whether you are underweight, normal weight, overweight, or obese.  This information is not intended to replace advice given to you by your health care provider. Make sure you discuss any questions you have with your health care provider.  Document Revised: 09/09/2020 Document Reviewed: 07/17/2020  [employer] Patient Education © 2021 [employer]

## 2022-07-29 NOTE — ASSESSMENT & PLAN NOTE
Continue Emgality injections and regular follow-up with Dr. Oneil.   Continue sumatriptan with naproxen for acute headaches as needed.

## 2022-07-31 DIAGNOSIS — D62 ANEMIA DUE TO BLOOD LOSS, ACUTE: Primary | ICD-10-CM

## 2022-08-10 DIAGNOSIS — G43.109 MIGRAINE WITH AURA AND WITHOUT STATUS MIGRAINOSUS, NOT INTRACTABLE: ICD-10-CM

## 2022-08-10 RX ORDER — SUMATRIPTAN 100 MG/1
TABLET, FILM COATED ORAL
Qty: 18 TABLET | Refills: 1 | Status: SHIPPED | OUTPATIENT
Start: 2022-08-10 | End: 2023-01-03

## 2022-08-12 ENCOUNTER — TELEPHONE (OUTPATIENT)
Dept: INTERNAL MEDICINE | Facility: CLINIC | Age: 52
End: 2022-08-12

## 2022-08-25 RX ORDER — FAMOTIDINE 40 MG/1
TABLET, FILM COATED ORAL
Qty: 180 TABLET | Refills: 1 | Status: SHIPPED | OUTPATIENT
Start: 2022-08-25 | End: 2023-02-15

## 2022-08-25 NOTE — TELEPHONE ENCOUNTER
Rx Refill Note  Requested Prescriptions     Pending Prescriptions Disp Refills   • famotidine (PEPCID) 40 MG tablet [Pharmacy Med Name: FAMOTIDINE 40 MG TABLET] 180 tablet 1     Sig: TAKE 1 TABLET BY MOUTH TWICE A DAY      Last office visit with prescribing clinician: 7/29/2022      Next office visit with prescribing clinician: 2/3/2023            Izabel Ireland LPN  08/25/22, 09:04 EDT

## 2022-08-29 DIAGNOSIS — G43.109 MIGRAINE WITH AURA AND WITHOUT STATUS MIGRAINOSUS, NOT INTRACTABLE: Primary | ICD-10-CM

## 2022-09-07 RX ORDER — PROMETHAZINE HYDROCHLORIDE 25 MG/1
25 TABLET ORAL EVERY 8 HOURS PRN
Qty: 12 TABLET | Refills: 3 | Status: SHIPPED | OUTPATIENT
Start: 2022-09-07

## 2022-09-07 NOTE — TELEPHONE ENCOUNTER
Rx Refill Note  Requested Prescriptions     Pending Prescriptions Disp Refills   • promethazine (PHENERGAN) 25 MG tablet 12 tablet 3     Sig: Take 1 tablet by mouth Every 8 (Eight) Hours As Needed for Nausea or Vomiting.      Last office visit with prescribing clinician: 7/29/2022      Next office visit with prescribing clinician: 2/3/2023            Yaritza Otoole LPN  09/07/22, 12:04 EDT

## 2022-09-14 RX ORDER — CYCLOBENZAPRINE HCL 10 MG
TABLET ORAL
Qty: 60 TABLET | Refills: 2 | Status: SHIPPED | OUTPATIENT
Start: 2022-09-14 | End: 2022-12-14

## 2022-10-25 DIAGNOSIS — G43.109 MIGRAINE WITH AURA AND WITHOUT STATUS MIGRAINOSUS, NOT INTRACTABLE: Primary | ICD-10-CM

## 2022-11-07 DIAGNOSIS — G43.109 MIGRAINE WITH AURA AND WITHOUT STATUS MIGRAINOSUS, NOT INTRACTABLE: ICD-10-CM

## 2022-11-07 RX ORDER — NAPROXEN 500 MG/1
500 TABLET ORAL 2 TIMES DAILY WITH MEALS
Qty: 60 TABLET | Refills: 4 | Status: SHIPPED | OUTPATIENT
Start: 2022-11-07 | End: 2023-04-03

## 2022-11-07 NOTE — TELEPHONE ENCOUNTER
Rx Refill Note  Requested Prescriptions     Pending Prescriptions Disp Refills   • naproxen (NAPROSYN) 500 MG tablet 60 tablet 4      Last office visit with prescribing clinician: 7/29/2022      Next office visit with prescribing clinician: 2/3/2023            Yaritza Otoole LPN  11/07/22, 11:34 EST

## 2022-11-29 NOTE — TELEPHONE ENCOUNTER
Adriana from [facility] called requesting the MRI that was done on the patient due to her migraines    They have been released to their fax number [phone]  
24

## 2022-12-09 ENCOUNTER — LAB (OUTPATIENT)
Dept: LAB | Facility: HOSPITAL | Age: 52
End: 2022-12-09

## 2022-12-09 DIAGNOSIS — D62 ANEMIA DUE TO BLOOD LOSS, ACUTE: ICD-10-CM

## 2022-12-09 LAB
BASOPHILS # BLD AUTO: 0.1 10*3/MM3 (ref 0–0.2)
BASOPHILS NFR BLD AUTO: 1.3 % (ref 0–1.5)
DEPRECATED RDW RBC AUTO: 45.2 FL (ref 37–54)
EOSINOPHIL # BLD AUTO: 0.07 10*3/MM3 (ref 0–0.4)
EOSINOPHIL NFR BLD AUTO: 0.9 % (ref 0.3–6.2)
ERYTHROCYTE [DISTWIDTH] IN BLOOD BY AUTOMATED COUNT: 17.7 % (ref 12.3–15.4)
FERRITIN SERPL-MCNC: 6.92 NG/ML (ref 13–150)
HCT VFR BLD AUTO: 33.9 % (ref 34–46.6)
HGB BLD-MCNC: 10.1 G/DL (ref 12–15.9)
IMM GRANULOCYTES # BLD AUTO: 0.03 10*3/MM3 (ref 0–0.05)
IMM GRANULOCYTES NFR BLD AUTO: 0.4 % (ref 0–0.5)
IRON 24H UR-MRATE: 21 MCG/DL (ref 37–145)
IRON SATN MFR SERPL: 4 % (ref 20–50)
LYMPHOCYTES # BLD AUTO: 0.91 10*3/MM3 (ref 0.7–3.1)
LYMPHOCYTES NFR BLD AUTO: 12.2 % (ref 19.6–45.3)
MCH RBC QN AUTO: 21.5 PG (ref 26.6–33)
MCHC RBC AUTO-ENTMCNC: 29.8 G/DL (ref 31.5–35.7)
MCV RBC AUTO: 72.3 FL (ref 79–97)
MONOCYTES # BLD AUTO: 0.34 10*3/MM3 (ref 0.1–0.9)
MONOCYTES NFR BLD AUTO: 4.6 % (ref 5–12)
NEUTROPHILS NFR BLD AUTO: 5.99 10*3/MM3 (ref 1.7–7)
NEUTROPHILS NFR BLD AUTO: 80.6 % (ref 42.7–76)
NRBC BLD AUTO-RTO: 0 /100 WBC (ref 0–0.2)
PLATELET # BLD AUTO: 339 10*3/MM3 (ref 140–450)
PMV BLD AUTO: 10 FL (ref 6–12)
RBC # BLD AUTO: 4.69 10*6/MM3 (ref 3.77–5.28)
TIBC SERPL-MCNC: 492 MCG/DL (ref 298–536)
TRANSFERRIN SERPL-MCNC: 330 MG/DL (ref 200–360)
WBC NRBC COR # BLD: 7.44 10*3/MM3 (ref 3.4–10.8)

## 2022-12-09 PROCEDURE — 83090 ASSAY OF HOMOCYSTEINE: CPT

## 2022-12-09 PROCEDURE — 82607 VITAMIN B-12: CPT

## 2022-12-09 PROCEDURE — 83540 ASSAY OF IRON: CPT

## 2022-12-09 PROCEDURE — 82728 ASSAY OF FERRITIN: CPT

## 2022-12-09 PROCEDURE — 84466 ASSAY OF TRANSFERRIN: CPT

## 2022-12-09 PROCEDURE — 85025 COMPLETE CBC W/AUTO DIFF WBC: CPT

## 2022-12-10 LAB
HCYS SERPL-MCNC: 6.8 UMOL/L (ref 0–15)
VIT B12 BLD-MCNC: 404 PG/ML (ref 211–946)

## 2022-12-14 RX ORDER — CYCLOBENZAPRINE HCL 10 MG
TABLET ORAL
Qty: 60 TABLET | Refills: 2 | Status: SHIPPED | OUTPATIENT
Start: 2022-12-14 | End: 2023-03-15

## 2022-12-19 RX ORDER — FLUTICASONE PROPIONATE 50 MCG
1 SPRAY, SUSPENSION (ML) NASAL NIGHTLY
Qty: 16 ML | Refills: 5 | Status: SHIPPED | OUTPATIENT
Start: 2022-12-19

## 2022-12-19 NOTE — TELEPHONE ENCOUNTER
Rx Refill Note  Requested Prescriptions     Pending Prescriptions Disp Refills   • fluticasone (FLONASE) 50 MCG/ACT nasal spray 16 mL 5     Si spray into the nostril(s) as directed by provider Every Night.      Last office visit with prescribing clinician: 2022   Last telemedicine visit with prescribing clinician: 2023   Next office visit with prescribing clinician: 2023                         Would you like a call back once the refill request has been completed: [] Yes [] No    If the office needs to give you a call back, can they leave a voicemail: [] Yes [] No    Izabel Ireland LPN  22, 09:58 EST

## 2023-01-02 DIAGNOSIS — G43.109 MIGRAINE WITH AURA AND WITHOUT STATUS MIGRAINOSUS, NOT INTRACTABLE: ICD-10-CM

## 2023-01-03 RX ORDER — SUMATRIPTAN 100 MG/1
TABLET, FILM COATED ORAL
Qty: 18 TABLET | Refills: 8 | Status: SHIPPED | OUTPATIENT
Start: 2023-01-03 | End: 2023-03-06 | Stop reason: SDUPTHER

## 2023-01-24 RX ORDER — TRAZODONE HYDROCHLORIDE 50 MG/1
TABLET ORAL
COMMUNITY
Start: 2022-12-09 | End: 2023-01-27

## 2023-01-27 ENCOUNTER — OFFICE VISIT (OUTPATIENT)
Dept: NEUROLOGY | Facility: CLINIC | Age: 53
End: 2023-01-27
Payer: COMMERCIAL

## 2023-01-27 VITALS
SYSTOLIC BLOOD PRESSURE: 112 MMHG | WEIGHT: 144 LBS | OXYGEN SATURATION: 99 % | HEIGHT: 64 IN | TEMPERATURE: 97.6 F | BODY MASS INDEX: 24.59 KG/M2 | DIASTOLIC BLOOD PRESSURE: 76 MMHG | RESPIRATION RATE: 18 BRPM | HEART RATE: 110 BPM

## 2023-01-27 DIAGNOSIS — G43.011 INTRACTABLE MIGRAINE WITHOUT AURA AND WITH STATUS MIGRAINOSUS: Primary | ICD-10-CM

## 2023-01-27 PROCEDURE — 99204 OFFICE O/P NEW MOD 45 MIN: CPT | Performed by: NURSE PRACTITIONER

## 2023-01-27 RX ORDER — ATOGEPANT 60 MG/1
60 TABLET ORAL DAILY
Qty: 30 TABLET | Refills: 11 | Status: SHIPPED | OUTPATIENT
Start: 2023-01-27 | End: 2023-03-17

## 2023-01-27 RX ORDER — LASMIDITAN 100 MG/1
100 TABLET ORAL ONCE AS NEEDED
Qty: 8 TABLET | Refills: 5 | Status: SHIPPED | OUTPATIENT
Start: 2023-01-27

## 2023-01-27 RX ORDER — LORATADINE 10 MG/1
10 TABLET ORAL DAILY
COMMUNITY
End: 2023-03-17 | Stop reason: SDUPTHER

## 2023-01-27 RX ORDER — ATOGEPANT 60 MG/1
60 TABLET ORAL DAILY
Qty: 4 TABLET | Refills: 0 | COMMUNITY
Start: 2023-01-27 | End: 2023-03-17

## 2023-01-27 NOTE — PROGRESS NOTES
New Neurology Patient Office Visit      Patient Name: Mildred French    Referring Physician: Roula Clayton MD    Chief Complaint:    Chief Complaint   Patient presents with   • Migraine       History of Present Illness: Mildred French is a 53 y.o. female who is here today to establish care with Neurology. Pt  has had migraine headaches for 19 years. Currently taking Imitrex 100mg and naproxen 500mg.  has taken many medications for migraines over the years and nothing has helped. Has an allergy to Topamax and Emgality. States migraines have recently gotten worse (within the past year and a half). States currently experiencing around 30 per month.     Age onset: 34    Headache description: initially started retro orbital OD, typically resolved with Imitrex and ibuprofen. She has been taking Imitrex and naproxen daily. May also experience visual changes and slurred speech.     + Nausea/+Photophobia (uncertain if related to previous eye surgery)/+ Phonophobia    In the past year, migraine intensity and frequency has increased.     HAD/week: 7    MHD/week: 3    Meds tried: Topamax (hair loss), Emgality (hair loss, facial hives), beta blockers, Imitrex, Maxalt, Lamictal, occipital nerve blocks     Her son passed away three years ago.     The following portions of the patient's history were reviewed and updated as appropriate: allergies, current medications, past family history, past medical history, past social history, past surgical history and problem list.    Subjective      Review of Systems:   Review of Systems   Constitutional: Positive for activity change and fatigue.   HENT: Positive for ear pain.    Respiratory: Positive for chest tightness.    Cardiovascular: Positive for chest pain.   Gastrointestinal: Positive for constipation and nausea.   Genitourinary: Positive for vaginal discharge.   Musculoskeletal: Positive for neck pain and neck stiffness.   Allergic/Immunologic:  Positive for environmental allergies.   Neurological: Positive for dizziness, light-headedness, headache and confusion.   Psychiatric/Behavioral: The patient is nervous/anxious.    All other systems reviewed and are negative.      Past Medical History:   Past Medical History:   Diagnosis Date   • Anxiety    • B12 deficiency 1/15/2019    Recheck today   • Bipolar II disorder (HCC)    • Fibromyalgia    • GERD (gastroesophageal reflux disease)    • Goiter    • Iron deficiency 2019 Roula Clayton MD  Continue taking 2 of the [place] vitamins with iron daily.   • Major depressive disorder    • Migraines     4-5 times a week    • Scoliosis    • Sleep apnea      Past Surgical History:   Past Surgical History:   Procedure Laterality Date   • BARIATRIC SURGERY     •  SECTION  1997   •  SECTION  1988   • TONSILLECTOMY     • WISDOM TOOTH EXTRACTION         Family History:   Family History   Problem Relation Age of Onset   • Thyroid disease Mother    • Hypertension Mother    • Hyperlipidemia Mother    • Obesity Mother    • Depression Mother    • Obesity Sister    • Depression Sister    • Thyroid disease Sister    • Thyroid disease Maternal Aunt    • Coronary artery disease Maternal Grandmother    • Thyroid disease Father    • Diabetes Other    • Sudden death Son 22        Brugada syn   • Heart defect Son 21        Positive for Brugada syndrome.  No structural heart disease.  Asymptomatic   • Breast cancer Neg Hx      Social History:   Social History     Socioeconomic History   • Marital status:    Tobacco Use   • Smoking status: Never   • Smokeless tobacco: Never   Vaping Use   • Vaping Use: Never used   Substance and Sexual Activity   • Alcohol use: No   • Drug use: No   • Sexual activity: Defer     Medications:     Current Outpatient Medications:   •  ALPRAZolam XR (XANAX XR) 1 MG 24 hr tablet, Take 1 mg by mouth Every Morning., Disp: , Rfl:   •  cyclobenzaprine  (FLEXERIL) 10 MG tablet, TAKE 1 TABLET BY MOUTH 2 TIMES A DAY AS NEEDED FOR MUSCLE SPASMS., Disp: 60 tablet, Rfl: 2  •  famotidine (PEPCID) 40 MG tablet, TAKE 1 TABLET BY MOUTH TWICE A DAY, Disp: 180 tablet, Rfl: 1  •  fluticasone (FLONASE) 50 MCG/ACT nasal spray, 1 spray into the nostril(s) as directed by provider Every Night., Disp: 16 mL, Rfl: 5  •  lamoTRIgine (LaMICtal) 150 MG tablet, Take 300 mg by mouth Every Morning., Disp: , Rfl: 1  •  loratadine (Claritin) 10 MG tablet, Take 10 mg by mouth Daily., Disp: , Rfl:   •  naproxen (NAPROSYN) 500 MG tablet, Take 1 tablet by mouth 2 (Two) Times a Day With Meals., Disp: 60 tablet, Rfl: 4  •  promethazine (PHENERGAN) 25 MG tablet, Take 1 tablet by mouth Every 8 (Eight) Hours As Needed for Nausea or Vomiting., Disp: 12 tablet, Rfl: 3  •  SUMAtriptan (IMITREX) 100 MG tablet, TAKE ONE TABLET AT ONSET OF HEADACHE. MAY REPEAT DOSE ONE TIME IN 2 HOURS IF HEADACHE NOT RELIEVED., Disp: 18 tablet, Rfl: 8  •  VYVANSE 70 MG capsule, Take 70 mg by mouth Every Morning  , Disp: , Rfl:   •  ziprasidone (GEODON) 80 MG capsule, Take 80 mg by mouth 2 (Two) Times a Day., Disp: , Rfl: 1  •  Atogepant (Qulipta) 60 MG tablet, Take 1 tablet by mouth Daily., Disp: 30 tablet, Rfl: 11  •  Atogepant (Qulipta) 60 MG tablet, Take 1 tablet by mouth Daily., Disp: 4 tablet, Rfl: 0  •  fluconazole (DIFLUCAN) 150 MG tablet, Take 1 tablet by mouth Daily As Needed (yeast vaginitis)., Disp: 3 tablet, Rfl: 0  •  Lasmiditan Succinate (Reyvow) 100 MG tablet, Take 100 mg by mouth 1 (One) Time As Needed (migraine) for up to 1 dose. Do not drive for 8 hours after taking medication., Disp: 8 tablet, Rfl: 5  •  vitamin D (ERGOCALCIFEROL) 1.25 MG (24749 UT) capsule capsule, Take 1 capsule by mouth 1 (One) Time Per Week., Disp: 4 capsule, Rfl: 3    Allergies:   Allergies   Allergen Reactions   • Atenolol Other (See Comments)     Suicidal    • Emgality [Galcanezumab-Gnlm] Unknown - Low Severity   • Hydrocodone  "Unknown (See Comments) and Rash     Patient is not aware of a reaction to this drug   Patient is not aware of a reaction to this drug    • Topamax [Topiramate] Anxiety       Objective     Physical Exam:  Vital Signs:   Vitals:    01/27/23 1031   BP: 112/76   BP Location: Left arm   Patient Position: Sitting   Cuff Size: Adult   Pulse: 110   Resp: 18   Temp: 97.6 °F (36.4 °C)   TempSrc: Temporal   SpO2: 99%   Weight: 65.3 kg (144 lb)   Height: 162.6 cm (64\")   PainSc:   1   PainLoc: Head       Physical Exam  Vitals and nursing note reviewed.   Eyes:      Extraocular Movements: EOM normal.      Pupils: Pupils are equal, round, and reactive to light.   Neck:      Vascular: No carotid bruit.   Cardiovascular:      Rate and Rhythm: Regular rhythm.      Heart sounds: Normal heart sounds.   Pulmonary:      Effort: Pulmonary effort is normal.      Breath sounds: Normal breath sounds.   Neurological:      General: No focal deficit present.      Mental Status: She is oriented to person, place, and time.      Motor: Motor strength is normal.      Coordination: Heel to Shin Test and Romberg Test normal.      Gait: Gait is intact.   Psychiatric:         Mood and Affect: Mood normal.         Speech: Speech normal.         Behavior: Behavior normal.         Neurologic Exam     Mental Status   Oriented to person, place, and time.   Attention: normal. Concentration: normal.   Speech: speech is normal   Level of consciousness: alert    Cranial Nerves     CN II   Visual fields full to confrontation.   Visual acuity: normal with correction    CN III, IV, VI   Pupils are equal, round, and reactive to light.  Extraocular motions are normal.   Right pupil: Shape: regular. Reactivity: brisk.   Left pupil: Shape: regular. Reactivity: brisk.   Diplopia: none  Ophthalmoparesis: none  Upgaze: normal  Downgaze: normal    CN V   Facial sensation intact.     CN VII   Facial expression full, symmetric.     CN VIII   CN VIII normal.     CN IX, X "   CN IX normal.   CN X normal.     CN XI   CN XI normal.     CN XII   CN XII normal.     Motor Exam   Muscle bulk: normal  Overall muscle tone: normal  Right arm pronator drift: absent  Left arm pronator drift: absent    Strength   Strength 5/5 throughout.     Sensory Exam   Light touch normal.     Gait, Coordination, and Reflexes     Gait  Gait: normal    Coordination   Romberg: negative  Heel to shin coordination: normal    Tremor   Resting tremor: absent    Reflexes   Reflexes 2+ except as noted.      Results Review:   I reviewed the patient's new clinical results.  I have reviewed the patient's other medical records to include, labs, radiology and referrals.   Previous neurology records, referral notes reviewed    Assessment / Plan      Assessment/Plan:   Diagnoses and all orders for this visit:    1. Intractable migraine without aura and with status migrainosus (Primary)  -     Atogepant (Qulipta) 60 MG tablet; Take 1 tablet by mouth Daily.  Dispense: 30 tablet; Refill: 11  -     Lasmiditan Succinate (Reyvow) 100 MG tablet; Take 100 mg by mouth 1 (One) Time As Needed (migraine) for up to 1 dose. Do not drive for 8 hours after taking medication.  Dispense: 8 tablet; Refill: 5    Other orders  -     Atogepant (Qulipta) 60 MG tablet; Take 1 tablet by mouth Daily.  Dispense: 4 tablet; Refill: 0    Patient has not experienced relief with multiple triptans, or abortive -gepants including Nurtec and Ubrelvy.  She has average 12 MHD/month. We discussed trying Reyvow for relief of migraine attacks, and patient agreed to this today.  I discussed side effects including dizziness and somnolence, and instructed her not to drive for 8 hours after taking medication which she acknowledged.  We discussed headache treatment options at length, patient opted to begin Qulipta and samples provided today, dosing and potential side effects reviewed.  I explained first dose effect, and asked her to give medication at least 6 weeks  trial.  She has also been counseled to limit using Google search for medications and potential side effects.  If patient experiences 14 or more headache days per month despite Qulipta, we discussed initiating Botox therapy and she is in agreement with this plan today.  Follow Up:   Return in about 3 months (around 4/27/2023) for Next scheduled follow up.     [facility]shad [facility]   AS THE PROVIDER, I PERSONALLY WORE PPE DURING ENTIRE FACE TO FACE ENCOUNTER IN CLINIC WITH THE PATIENT. PATIENT ALSO WORE PPE DURING ENTIRE FACE TO FACE ENCOUNTER EXCEPT FOR A MAX OF 30 SECONDS DURING NEUROLOGICAL EVALUATION OF CRANIAL NERVES AND THEN MASK WAS PLACED BACK OVER PATIENT FACE FOR REMAINDER OF VISIT. I WASHED MY HANDS BEFORE AND AFTER VISIT.    Please note that portions of this note may have been completed with a voice recognition program. Efforts were made to edit the dictations, but occasionally words are mistranscribed.

## 2023-02-01 ENCOUNTER — TELEPHONE (OUTPATIENT)
Dept: NEUROLOGY | Facility: CLINIC | Age: 53
End: 2023-02-01
Payer: COMMERCIAL

## 2023-02-01 NOTE — TELEPHONE ENCOUNTER
Spoke with patient and let her know that her Qulitpa has been approved and that I had notified [facility], she should be receiving her medicine in the mail in the next few days.     Pt verbalized understanding.

## 2023-02-14 ENCOUNTER — PATIENT MESSAGE (OUTPATIENT)
Dept: INTERNAL MEDICINE | Facility: CLINIC | Age: 53
End: 2023-02-14
Payer: COMMERCIAL

## 2023-02-14 NOTE — TELEPHONE ENCOUNTER
From: Mildred French  To: Roula Clayton  Sent: 2/14/2023 1:46 PM EST  Subject: Reyvow    Hello. I saw Julissa Arshad on January 27, 2023. She prescribed Reyvow 100 mg tablet for my migraines. I believe that she has since moved from that office. I never received a prescription for this medication. I received an approval letter from my insurance last Friday. How do I get this medication? The other medication that she prescribed, Quilipta, had to come through an online pharmacy. I do not know the name of the pharmacy. She just glossed over it and I had to contact her to see about that prescription as well. She is no longer listed on my [employer] page as a provider. What do I need to do?

## 2023-02-15 ENCOUNTER — TELEPHONE (OUTPATIENT)
Dept: NEUROLOGY | Facility: CLINIC | Age: 53
End: 2023-02-15
Payer: COMMERCIAL

## 2023-02-15 RX ORDER — FAMOTIDINE 40 MG/1
TABLET, FILM COATED ORAL
Qty: 180 TABLET | Refills: 1 | Status: SHIPPED | OUTPATIENT
Start: 2023-02-15

## 2023-02-15 NOTE — TELEPHONE ENCOUNTER
Called patient and let her know that it went through and she should be able to get her medicine.     Gave patient phone number to contact pharmacy to get set up for it to be shipped.

## 2023-02-15 NOTE — TELEPHONE ENCOUNTER
PATIENT OF SLOANE OLIVER.  PT CALLING IN TODAY BECAUSE AT LAST OV SHE WAS PRESCRIBED REYVOW 100 MG, SHE HAS NOT RECEIVED THIS MEDICATION FROM     Psychiatric  PH: [phone]  FX: [phone]    AND DOES NOT KNOW WHY.    CAN PRESCRIPTION BE SENT AGAIN? SHOULD A DIFFERENT PHARMACY BE USED?    PLEASE ADVISE PATIENT    THANK YOU

## 2023-02-16 ENCOUNTER — TELEPHONE (OUTPATIENT)
Dept: NEUROLOGY | Facility: CLINIC | Age: 53
End: 2023-02-16
Payer: COMMERCIAL

## 2023-02-16 RX ORDER — FAMOTIDINE 40 MG/1
40 TABLET, FILM COATED ORAL 2 TIMES DAILY
Qty: 180 TABLET | Refills: 1 | OUTPATIENT
Start: 2023-02-16

## 2023-02-16 NOTE — TELEPHONE ENCOUNTER
Provider: MALU BRAR  Caller: [facility] - PHARMACIST  Relationship to Patient: PHARMACY  Phone Number: [phone]  Reason for Call: [facility] CALLED  REGARDING PATIENTS PRESCRIPTION FOR Lasmiditan Succinate (Reyvow) 100 MG tablet. PHARMACIST STATED AS THIS IS A CONTROLLED SUBSTANCE THEY NEED PRACTICE TO PROVIDE PATIENTS SSN. HUB UNABLE TO RELEASE INFO, PLEASE REVIEW AND ADVISE. THANK YOU.

## 2023-03-06 DIAGNOSIS — G43.109 MIGRAINE WITH AURA AND WITHOUT STATUS MIGRAINOSUS, NOT INTRACTABLE: ICD-10-CM

## 2023-03-06 RX ORDER — SUMATRIPTAN 100 MG/1
TABLET, FILM COATED ORAL
Qty: 18 TABLET | Refills: 8 | Status: SHIPPED | OUTPATIENT
Start: 2023-03-06

## 2023-03-06 NOTE — TELEPHONE ENCOUNTER
Rx Refill Note  Requested Prescriptions     Pending Prescriptions Disp Refills   • SUMAtriptan (IMITREX) 100 MG tablet 18 tablet 8     Sig: Take one tablet at onset of headache. May repeat dose one time in 2 hours if headache not relieved.      Last office visit with prescribing clinician: 7/29/2022   Last telemedicine visit with prescribing clinician: 3/17/2023   Next office visit with prescribing clinician: 3/17/2023                         Would you like a call back once the refill request has been completed: [] Yes [] No    If the office needs to give you a call back, can they leave a voicemail: [] Yes [] No    Izabel Ireland LPN  03/06/23, 10:51 EST

## 2023-03-15 RX ORDER — CYCLOBENZAPRINE HCL 10 MG
TABLET ORAL
Qty: 60 TABLET | Refills: 2 | Status: SHIPPED | OUTPATIENT
Start: 2023-03-15

## 2023-03-17 ENCOUNTER — OFFICE VISIT (OUTPATIENT)
Dept: INTERNAL MEDICINE | Facility: CLINIC | Age: 53
End: 2023-03-17
Payer: COMMERCIAL

## 2023-03-17 VITALS
DIASTOLIC BLOOD PRESSURE: 80 MMHG | SYSTOLIC BLOOD PRESSURE: 122 MMHG | HEIGHT: 64 IN | OXYGEN SATURATION: 99 % | WEIGHT: 147.2 LBS | HEART RATE: 115 BPM | BODY MASS INDEX: 25.13 KG/M2 | TEMPERATURE: 98 F

## 2023-03-17 DIAGNOSIS — N92.6 IRREGULAR PERIODS: ICD-10-CM

## 2023-03-17 DIAGNOSIS — E55.9 VITAMIN D DEFICIENCY: ICD-10-CM

## 2023-03-17 DIAGNOSIS — D62 ANEMIA DUE TO BLOOD LOSS, ACUTE: ICD-10-CM

## 2023-03-17 DIAGNOSIS — G43.109 MIGRAINE WITH AURA AND WITHOUT STATUS MIGRAINOSUS, NOT INTRACTABLE: ICD-10-CM

## 2023-03-17 DIAGNOSIS — E66.3 OVERWEIGHT WITH BODY MASS INDEX (BMI) OF 25 TO 25.9 IN ADULT: Chronic | ICD-10-CM

## 2023-03-17 DIAGNOSIS — E78.00 HYPERCHOLESTEROLEMIA: Primary | ICD-10-CM

## 2023-03-17 DIAGNOSIS — F33.1 MAJOR DEPRESSIVE DISORDER, RECURRENT, MODERATE: ICD-10-CM

## 2023-03-17 PROCEDURE — 99214 OFFICE O/P EST MOD 30 MIN: CPT | Performed by: INTERNAL MEDICINE

## 2023-03-17 RX ORDER — LORATADINE 10 MG/1
10 TABLET ORAL DAILY
Qty: 90 TABLET | Refills: 1 | Status: SHIPPED | OUTPATIENT
Start: 2023-03-17

## 2023-03-17 RX ORDER — ALPRAZOLAM 1 MG/1
1 TABLET ORAL DAILY PRN
COMMUNITY

## 2023-03-17 NOTE — ASSESSMENT & PLAN NOTE
She will continue to monitor and note any bleeding, even spotting, on her calendar and follow up with the GYN.

## 2023-03-17 NOTE — ASSESSMENT & PLAN NOTE
She will continue using sumatriptan or the Reyvow as needed for acute headaches. She will follow up with the neurologist as planned. I have encouraged her to try the Botox injections that they have recommended. Also doing neck stretches and using moist heat pack on the neck can definitely help.

## 2023-03-17 NOTE — ASSESSMENT & PLAN NOTE
The patient had been having very heavy periods close together. Her ferritin was 26 at last lab draw. She had side effects with the iron tablet that we had prescribed. She is now trying over-the-counter Vitron-C. I have advised her to take it twice per day.

## 2023-03-17 NOTE — PROGRESS NOTES
[facility]     Mildred French  1970   [id number]      Patient Care Team:  Roula Clayton MD as PCP - General (Internal Medicine)  Reinaldo Oneil MD as Consulting Physician (Neurology)  Reinaldo Oneil MD as Consulting Physician (Neurology)    Chief Complaint   Patient presents with   • Migraine            HPI  Patient is a 53 y.o. female who presents today for a follow-up visit.    Anemia due to blood loss  The patient states that her iron has been low. She cannot take the prescription iron. When she took it she had a heartburn and a metallic taste in her mouth. Her ferritin in 12/2022 was 6.92. She will try to take the over-the-counter iron tablet twice per day.     Irregular periods  She saw her gynecologist, Dr. Roel Graff, who wants to monitor her for 6 weeks. He told her that she is so close to menopause. He did some blood work last week to see if she was in menopause or perimenopausal, but she has not received the results yet. She was having a period at least 1 week, sometimes 3 days in between. The patient has not had a period in 3 to 4 weeks. She had an ultrasound in the office, and he told her that her lining was supposed to be a 6, and her lining was 2.5.    Allergies  The patient started back on Claritin 10 mg. She never had allergy symptoms until she had the COVID-19 vaccine.    Migraines  Her head has been horrible for about 1.5 years. She sees Dr. Oneil for her migraines. The patient saw a nurse practitioner in [place], and they told her that she should try Botox. She has read online that it can cause neck and shoulders problems. The patient has fibromyalgia. Her insurance had approved the Botox twice, but she backed out every time. She is using the sumatriptan as needed, and it does help. The patient also has Reyvow to use as needed. It did help, but she cannot drive for 8 hours after taking it. She has tried taking the Qulipta twice, but she had side effects. One of  them was being really dizzy, like she was falling into the bed, and when she would try to walk, the other time she took it, she got really sick, and she could not go to work. She tried Wellbutrin, and it made her very sick.    Depression  She is not doing well with the death of her son. It took her 3.5 years for it to really hit. She was seeing a counselor, and it was helping, but she has a new company, and they knew less about insurance than she did. The patient had to get her booklet from her insurance company, and send them her explanation and benefits showing that her psychiatrist was 20 dollars. She is not sure if they got it fixed yet, but she needs to call them and ask them if they have. The patient has been doing psychiatry for 20 years. She called and apologized, but they could not get it fixed up. They charged 290 dollars per visit, and she cannot afford that. She will contact another place, but there are not many in [place], and they have not contacted her back.    Vitamin D deficiency  The patient is not taking her weekly vitamin D. She forgets to take it and will try a daily tablet to see if that is easier to remember. Her last vitamin D check in 12/2022 was 26 ng/mL.     She denies swelling in her ankles. The patient states her hands and feet stay cold.     Health maintenance  She postponed her colonoscopy until she gets some results back from him.   The patient had a Pap smear in 07/2022.   She had a mammogram.  The patient asked about the shingles vaccine and will check with her insurance to see where to get it.  She had a colonoscopy 7 years ago and was told to repeat it in 10 years.       CHRONIC CONDITIONS      Past Medical History:   Diagnosis Date   • Anxiety    • B12 deficiency 1/15/2019    Recheck today   • Bipolar II disorder (HCC)    • Fibromyalgia    • GERD (gastroesophageal reflux disease)    • Goiter    • Iron deficiency 1/11/2019 6/11/2021 Roula Clayton MD  Continue taking 2 of the  "[place] vitamins with iron daily.   • Major depressive disorder    • Migraines     4-5 times a week    • Scoliosis    • Sleep apnea        Past Surgical History:   Procedure Laterality Date   • BARIATRIC SURGERY     •  SECTION  1997   •  SECTION  1988   • TONSILLECTOMY     • WISDOM TOOTH EXTRACTION         Family History   Problem Relation Age of Onset   • Thyroid disease Mother    • Hypertension Mother    • Hyperlipidemia Mother    • Obesity Mother    • Depression Mother    • Obesity Sister    • Depression Sister    • Thyroid disease Sister    • Thyroid disease Maternal Aunt    • Coronary artery disease Maternal Grandmother    • Thyroid disease Father    • Diabetes Other    • Sudden death Son 22        Brugada syn   • Heart defect Son 21        Positive for Brugada syndrome.  No structural heart disease.  Asymptomatic   • Breast cancer Neg Hx        Social History     Socioeconomic History   • Marital status:    Tobacco Use   • Smoking status: Never   • Smokeless tobacco: Never   Vaping Use   • Vaping Use: Never used   Substance and Sexual Activity   • Alcohol use: No   • Drug use: No   • Sexual activity: Defer       Allergies   Allergen Reactions   • Atenolol Other (See Comments)     Suicidal    • Emgality [Galcanezumab-Gnlm] Unknown - Low Severity   • Hydrocodone Unknown (See Comments) and Rash     Patient is not aware of a reaction to this drug   Patient is not aware of a reaction to this drug    • Qulipta [Atogepant] Nausea Only     Nausea and sleepiness    • Topamax [Topiramate] Anxiety         Vital Signs  Vitals:    23 1059   BP: 122/80   BP Location: Left arm   Patient Position: Sitting   Cuff Size: Adult   Pulse: 115   Temp: 98 °F (36.7 °C)   TempSrc: Infrared   SpO2: 99%   Weight: 66.8 kg (147 lb 3.2 oz)   Height: 162.6 cm (64.02\")     Body mass index is 25.25 kg/m².  BMI is >= 25 and <30. (Overweight) The following options were offered after discussion;: " weight loss educational material (shared in after visit summary), exercise counseling/recommendations and nutrition counseling/recommendations        Current Outpatient Medications:   •  ALPRAZolam (XANAX) 1 MG tablet, Take 1 tablet by mouth Daily As Needed for Anxiety., Disp: , Rfl:   •  ALPRAZolam XR (XANAX XR) 1 MG 24 hr tablet, Take 1 tablet by mouth Every Morning., Disp: , Rfl:   •  cyclobenzaprine (FLEXERIL) 10 MG tablet, TAKE 1 TABLET BY MOUTH 2 TIMES A DAY AS NEEDED FOR MUSCLE SPASMS., Disp: 60 tablet, Rfl: 2  •  famotidine (PEPCID) 40 MG tablet, TAKE 1 TABLET BY MOUTH TWICE A DAY, Disp: 180 tablet, Rfl: 1  •  fluconazole (DIFLUCAN) 150 MG tablet, Take 1 tablet by mouth Daily As Needed (yeast vaginitis)., Disp: 3 tablet, Rfl: 0  •  fluticasone (FLONASE) 50 MCG/ACT nasal spray, 1 spray into the nostril(s) as directed by provider Every Night. (Patient taking differently: 1 spray into the nostril(s) as directed by provider Daily As Needed.), Disp: 16 mL, Rfl: 5  •  lamoTRIgine (LaMICtal) 150 MG tablet, Take 2 tablets by mouth Every Morning., Disp: , Rfl: 1  •  Lasmiditan Succinate (Reyvow) 100 MG tablet, Take 100 mg by mouth 1 (One) Time As Needed (migraine) for up to 1 dose. Do not drive for 8 hours after taking medication., Disp: 8 tablet, Rfl: 5  •  loratadine (CLARITIN) 10 MG tablet, Take 1 tablet by mouth Daily., Disp: 90 tablet, Rfl: 1  •  naproxen (NAPROSYN) 500 MG tablet, Take 1 tablet by mouth 2 (Two) Times a Day With Meals., Disp: 60 tablet, Rfl: 4  •  promethazine (PHENERGAN) 25 MG tablet, Take 1 tablet by mouth Every 8 (Eight) Hours As Needed for Nausea or Vomiting., Disp: 12 tablet, Rfl: 3  •  SUMAtriptan (IMITREX) 100 MG tablet, Take one tablet at onset of headache. May repeat dose one time in 2 hours if headache not relieved., Disp: 18 tablet, Rfl: 8  •  VYVANSE 70 MG capsule, Take 1 capsule by mouth Every Morning, Disp: , Rfl:   •  ziprasidone (GEODON) 80 MG capsule, Take 1 capsule by mouth 2  (Two) Times a Day., Disp: , Rfl: 1  •  Cholecalciferol (vitamin D3) 125 MCG (5000 UT) tablet tablet, Take 1 tablet by mouth Daily., Disp: 90 tablet, Rfl: 3  •  Iron-Vitamin C  MG tablet, Take 1 tablet by mouth 2 (Two) Times a Day., Disp: , Rfl:     Physical Exam:    Physical Exam  Vitals and nursing note reviewed.   Constitutional:       Appearance: She is well-developed.      Comments: Mild overweight.   HENT:      Head: Normocephalic.   Eyes:      Conjunctiva/sclera: Conjunctivae normal.      Pupils: Pupils are equal, round, and reactive to light.   Neck:      Thyroid: No thyromegaly.   Cardiovascular:      Rate and Rhythm: Normal rate and regular rhythm.      Heart sounds: Normal heart sounds.   Pulmonary:      Effort: Pulmonary effort is normal.      Breath sounds: Normal breath sounds.   Musculoskeletal:         General: Normal range of motion.      Cervical back: Normal range of motion and neck supple.   Lymphadenopathy:      Cervical: No cervical adenopathy.   Neurological:      Mental Status: She is alert and oriented to person, place, and time.   Psychiatric:         Thought Content: Thought content normal.          ACE III MINI        Results Review:    None    CMP:  Lab Results   Component Value Date    BUN 14 07/29/2022    CREATININE 0.71 07/29/2022    EGFRIFNONA 78 06/11/2021    BCR 19.7 07/29/2022     07/29/2022    K 3.9 07/29/2022    CO2 24.0 07/29/2022    CALCIUM 9.1 07/29/2022    ALBUMIN 4.70 07/29/2022    BILITOT <0.2 07/29/2022    ALKPHOS 76 07/29/2022    AST 12 07/29/2022    ALT 7 07/29/2022     HbA1c:  No results found for: HGBA1C  Microalbumin:  No results found for: MICROALBUR, POCMALB, POCCREAT  Lipid Panel  Lab Results   Component Value Date    CHOL 185 07/29/2022    TRIG 91 07/29/2022    HDL 53 07/29/2022     (H) 07/29/2022    AST 12 07/29/2022    ALT 7 07/29/2022     Labs Reviewed:   Her ferritin level in 12/2022 was 6.92 ng/mL.    Medication Review: Medications reviewed  and noted  Patient Instructions   Problem List Items Addressed This Visit        Cardiac and Vasculature    Hypercholesterolemia - Primary    Current Assessment & Plan     The patient will continue trying to get more physical activity. She will continue to decrease fats and sugars in the diet.            Endocrine and Metabolic    Vitamin D deficiency    Current Assessment & Plan     We will change the weekly tablet to a 5000 unit tablet daily. Prescription was sent to the pharmacy.            Genitourinary and Reproductive     Irregular periods    Current Assessment & Plan     She will continue to monitor and note any bleeding, even spotting, on her calendar and follow up with the GYN.            Hematology and Neoplasia    Anemia due to blood loss, acute    Current Assessment & Plan     The patient had been having very heavy periods close together. Her ferritin was 26 at last lab draw. She had side effects with the iron tablet that we had prescribed. She is now trying over-the-counter Vitron-C. I have advised her to take it twice per day.         Relevant Medications    Iron-Vitamin C  MG tablet       Mental Health    Major depressive disorder, recurrent, moderate (HCC)    Overview     Geodon, lamotrigine, Xanax, and Vyvanse.  Regular follow-up with the psychiatrist.             Current Assessment & Plan     I have encouraged her to follow up with the psychiatrist and counselor near her home. She will try to get the insurance straightened out. In the meantime, she will continue taking lamotrigine, Geodon, Vyvanse, and Xanax as needed.         Relevant Medications    ziprasidone (GEODON) 80 MG capsule    VYVANSE 70 MG capsule    ALPRAZolam XR (XANAX XR) 1 MG 24 hr tablet    ALPRAZolam (XANAX) 1 MG tablet       Neuro    Migraine with aura and without status migrainosus, not intractable    Overview     Seeing Dr. Oneil in May as scheduled. Had 3 emgality injections.  Using sumatriptan and naproxen as needed for  acute headaches.             Current Assessment & Plan     She will continue using sumatriptan or the Reyvow as needed for acute headaches. She will follow up with the neurologist as planned. I have encouraged her to try the Botox injections that they have recommended. Also doing neck stretches and using moist heat pack on the neck can definitely help.             Relevant Medications    lamoTRIgine (LaMICtal) 150 MG tablet    naproxen (NAPROSYN) 500 MG tablet    Lasmiditan Succinate (Reyvow) 100 MG tablet    SUMAtriptan (IMITREX) 100 MG tablet    cyclobenzaprine (FLEXERIL) 10 MG tablet       Other    Overweight with body mass index (BMI) of 25 to 25.9 in adult (Chronic)    Current Assessment & Plan     She will continue to improve exercise and low-fat diet.                Diagnosis Plan   1. Hypercholesterolemia        2. Irregular periods        3. Anemia due to blood loss, acute        4. Migraine with aura and without status migrainosus, not intractable        5. Overweight with body mass index (BMI) of 25 to 25.9 in adult        6. Vitamin D deficiency        7. Major depressive disorder, recurrent, moderate (HCC)          Follow-up:  She will come back to see me in 07/2023 for annual physical exam.        Plan of care reviewed with patient at the conclusion of today's visit. Education was provided regarding diagnosis, management, and any prescribed or recommended OTC medications.Patient verbalizes understanding of and agreement with management plan.         Roula Clayton MD      Transcribed from ambient dictation for Roula Clayton MD by Ashlyn Mack.  03/17/23   14:08 EDT    Patient or patient representative verbalized consent to the visit recording.  I have personally performed the services described in this document as transcribed by the above individual, and it is both accurate and complete.

## 2023-03-17 NOTE — PATIENT INSTRUCTIONS
Patient Instructions   Problem List Items Addressed This Visit          Cardiac and Vasculature    Hypercholesterolemia - Primary    Current Assessment & Plan     The patient will continue trying to get more physical activity. She will continue to decrease fats and sugars in the diet.            Endocrine and Metabolic    Vitamin D deficiency    Current Assessment & Plan     We will change the weekly tablet to a 5000 unit tablet daily. Prescription was sent to the pharmacy.            Genitourinary and Reproductive     Irregular periods    Current Assessment & Plan     She will continue to monitor and note any bleeding, even spotting, on her calendar and follow up with the GYN.            Hematology and Neoplasia    Anemia due to blood loss, acute    Current Assessment & Plan     The patient had been having very heavy periods close together. Her ferritin was 26 at last lab draw. She had side effects with the iron tablet that we had prescribed. She is now trying over-the-counter Vitron-C. I have advised her to take it twice per day.         Relevant Medications    Iron-Vitamin C  MG tablet       Mental Health    Major depressive disorder, recurrent, moderate (HCC)    Overview     Geodon, lamotrigine, Xanax, and Vyvanse.  Regular follow-up with the psychiatrist.             Current Assessment & Plan     I have encouraged her to follow up with the psychiatrist and counselor near her home. She will try to get the insurance straightened out. In the meantime, she will continue taking lamotrigine, Geodon, Vyvanse, and Xanax as needed.         Relevant Medications    ziprasidone (GEODON) 80 MG capsule    VYVANSE 70 MG capsule    ALPRAZolam XR (XANAX XR) 1 MG 24 hr tablet    ALPRAZolam (XANAX) 1 MG tablet       Neuro    Migraine with aura and without status migrainosus, not intractable    Overview     Seeing Dr. Oneil in May as scheduled. Had 3 emgality injections.  Using sumatriptan and naproxen as needed for acute  headaches.             Current Assessment & Plan     She will continue using sumatriptan or the Reyvow as needed for acute headaches. She will follow up with the neurologist as planned. I have encouraged her to try the Botox injections that they have recommended. Also doing neck stretches and using moist heat pack on the neck can definitely help.             Relevant Medications    lamoTRIgine (LaMICtal) 150 MG tablet    naproxen (NAPROSYN) 500 MG tablet    Lasmiditan Succinate (Reyvow) 100 MG tablet    SUMAtriptan (IMITREX) 100 MG tablet    cyclobenzaprine (FLEXERIL) 10 MG tablet       Other    Overweight with body mass index (BMI) of 25 to 25.9 in adult (Chronic)    Current Assessment & Plan     She will continue to improve exercise and low-fat diet.

## 2023-03-17 NOTE — ASSESSMENT & PLAN NOTE
I have encouraged her to follow up with the psychiatrist and counselor near her home. She will try to get the insurance straightened out. In the meantime, she will continue taking lamotrigine, Geodon, Vyvanse, and Xanax as needed.

## 2023-03-17 NOTE — ASSESSMENT & PLAN NOTE
The patient will continue trying to get more physical activity. She will continue to decrease fats and sugars in the diet.

## 2023-03-17 NOTE — ASSESSMENT & PLAN NOTE
We will change the weekly tablet to a 5000 unit tablet daily. Prescription was sent to the pharmacy.

## 2023-03-30 RX ORDER — BISACODYL 5 MG/1
5 TABLET, DELAYED RELEASE ORAL DAILY PRN
Qty: 30 TABLET | Refills: 0 | Status: SHIPPED | OUTPATIENT
Start: 2023-03-30

## 2023-03-30 RX ORDER — POLYETHYLENE GLYCOL 3350 17 G/17G
17 POWDER, FOR SOLUTION ORAL 2 TIMES DAILY
Qty: 850 G | Refills: 11 | Status: SHIPPED | OUTPATIENT
Start: 2023-03-30

## 2023-03-30 RX ORDER — SODIUM PHOSPHATE, DIBASIC AND SODIUM PHOSPHATE, MONOBASIC, UNSPECIFIED FORM 7; 19 G/118ML; G/118ML
1 ENEMA RECTAL ONCE
Qty: 133 ML | Refills: 0 | Status: SHIPPED | OUTPATIENT
Start: 2023-03-30 | End: 2023-03-30

## 2023-04-02 DIAGNOSIS — G43.109 MIGRAINE WITH AURA AND WITHOUT STATUS MIGRAINOSUS, NOT INTRACTABLE: ICD-10-CM

## 2023-04-03 RX ORDER — NAPROXEN 500 MG/1
TABLET ORAL
Qty: 60 TABLET | Refills: 4 | Status: SHIPPED | OUTPATIENT
Start: 2023-04-03

## 2023-04-21 ENCOUNTER — CLINICAL SUPPORT (OUTPATIENT)
Dept: INTERNAL MEDICINE | Facility: CLINIC | Age: 53
End: 2023-04-21
Payer: COMMERCIAL

## 2023-04-21 DIAGNOSIS — Z23 NEED FOR VACCINATION: Primary | ICD-10-CM

## 2023-04-21 PROCEDURE — 90750 HZV VACC RECOMBINANT IM: CPT | Performed by: INTERNAL MEDICINE

## 2023-05-26 ENCOUNTER — OFFICE VISIT (OUTPATIENT)
Dept: INTERNAL MEDICINE | Facility: CLINIC | Age: 53
End: 2023-05-26
Payer: COMMERCIAL

## 2023-05-26 VITALS
OXYGEN SATURATION: 96 % | SYSTOLIC BLOOD PRESSURE: 126 MMHG | TEMPERATURE: 97.7 F | WEIGHT: 149.4 LBS | DIASTOLIC BLOOD PRESSURE: 76 MMHG | HEIGHT: 64 IN | BODY MASS INDEX: 25.51 KG/M2 | HEART RATE: 88 BPM

## 2023-05-26 DIAGNOSIS — E66.3 OVERWEIGHT WITH BODY MASS INDEX (BMI) OF 25 TO 25.9 IN ADULT: Chronic | ICD-10-CM

## 2023-05-26 DIAGNOSIS — F33.1 MAJOR DEPRESSIVE DISORDER, RECURRENT, MODERATE: ICD-10-CM

## 2023-05-26 DIAGNOSIS — T38.0X5A STEROID SIDE EFFECTS, INITIAL ENCOUNTER: Primary | ICD-10-CM

## 2023-05-26 DIAGNOSIS — M62.552 ATROPHY OF MUSCLE OF LEFT THIGH: ICD-10-CM

## 2023-05-26 DIAGNOSIS — E78.00 HYPERCHOLESTEROLEMIA: ICD-10-CM

## 2023-05-26 DIAGNOSIS — F41.1 GENERALIZED ANXIETY DISORDER: Chronic | ICD-10-CM

## 2023-05-26 PROBLEM — T50.905A MEDICATION ADVERSE EFFECT: Status: ACTIVE | Noted: 2023-05-26

## 2023-05-26 NOTE — ASSESSMENT & PLAN NOTE
She is encouraged to do a little more walking and exercise. Try to lose about 5 pounds since that will decrease the fatty areas on the sides of her thighs, which will even out the asymmetry a little.

## 2023-05-26 NOTE — ASSESSMENT & PLAN NOTE
On the exam of the left thigh area, there is atrophy of muscle and fat secondary to a steroid injection. The area of atrophy measures about 7 cm. There are no worrisome skin changes. No tenderness. This causes her hip area to be asymmetrical which affects her body image. It is causing the patient distress. The steroid injection occurred probably in 12/2022, but was not given in our office. We are referring to Dr. Edd Dorsey, plastic surgeon, for evaluation to see if he can even out the asymmetry.

## 2023-05-26 NOTE — PATIENT INSTRUCTIONS
Patient Instructions   Problem List Items Addressed This Visit          Cardiac and Vasculature    Hypercholesterolemia    Current Assessment & Plan     She will continue to improve low-fat, healthy diet, and try to increase walking and physical activity.            Mental Health    Generalized anxiety disorder (Chronic)    Overview     Geodon, lamotrigine, Xanax, and Vyvanse.  Regular follow-up with the psychiatrist.             Current Assessment & Plan     She will continue her current medications, lamotrigine and Geodon. She will continue Vyvanse and alprazolam as needed. She will continue regular follow-up with her psychiatrist. I have encouraged her to get out of the house on days that she is not working and walk a little. Physical activity also helps decrease symptoms of stress, anxiety, and mood.         Relevant Medications    ziprasidone (GEODON) 80 MG capsule    VYVANSE 70 MG capsule    ALPRAZolam XR (XANAX XR) 1 MG 24 hr tablet    ALPRAZolam (XANAX) 1 MG tablet    Major depressive disorder, recurrent, moderate (HCC)    Overview     Geodon, lamotrigine, Xanax, and Vyvanse.  Regular follow-up with the psychiatrist.             Current Assessment & Plan     She will continue her current medications, lamotrigine and Geodon. She will continue Vyvanse and alprazolam as needed. She will continue regular follow-up with her psychiatrist. I have encouraged her to get out of the house on days that she is not working and walk a little. Physical activity also helps decrease symptoms of stress, anxiety, and mood.         Relevant Medications    ziprasidone (GEODON) 80 MG capsule    VYVANSE 70 MG capsule    ALPRAZolam XR (XANAX XR) 1 MG 24 hr tablet    ALPRAZolam (XANAX) 1 MG tablet       Musculoskeletal and Injuries    Atrophy of muscle of left thigh    Current Assessment & Plan     On the exam of the left thigh area, there is atrophy of muscle and fat secondary to a steroid injection. The area of atrophy measures  about 7 cm. There are no worrisome skin changes. No tenderness. This causes her hip area to be asymmetrical which affects her body image. It is causing the patient distress. The steroid injection occurred probably in 12/2022, but was not given in our office. We are referring to Dr. Edd Dorsey, plastic surgeon, for evaluation to see if he can even out the asymmetry.         Relevant Orders    Ambulatory Referral to Plastic Surgery (Completed)       Other    Overweight with body mass index (BMI) of 25 to 25.9 in adult (Chronic)    Current Assessment & Plan     She is encouraged to do a little more walking and exercise. Try to lose about 5 pounds since that will decrease the fatty areas on the sides of her thighs, which will even out the asymmetry a little.         Steroid side effects, initial encounter - Primary    Current Assessment & Plan     On the exam of the left thigh area, there is atrophy of muscle and fat secondary to a steroid injection. The area of atrophy measures about 7 cm. There are no worrisome skin changes. No tenderness. This causes her hip area to be asymmetrical which affects her body image. It is causing the patient distress. The steroid injection occurred probably in 12/2022, but was not given in our office. We are referring to Dr. Edd Dorsey, plastic surgeon, for evaluation to see if he can even out the asymmetry.          Relevant Orders    Ambulatory Referral to Plastic Surgery (Completed)       BMI for Adults  What is BMI?  Body mass index (BMI) is a number that is calculated from a person's weight and height. BMI can help estimate how much of a person's weight is composed of fat. BMI does not measure body fat directly. Rather, it is an alternative to procedures that directly measure body fat, which can be difficult and expensive.  BMI can help identify people who may be at higher risk for certain medical problems.  What are BMI measurements used for?  BMI is used as a screening tool to  "identify possible weight problems. It helps determine whether a person is obese, overweight, a healthy weight, or underweight.  BMI is useful for:  Identifying a weight problem that may be related to a medical condition or may increase the risk for medical problems.  Promoting changes, such as changes in diet and exercise, to help reach a healthy weight. BMI screening can be repeated to see if these changes are working.  How is BMI calculated?  BMI involves measuring your weight in relation to your height. Both height and weight are measured, and the BMI is calculated from those numbers. This can be done either in English ([place] or metric measurements. Note that charts and online BMI calculators are available to help you find your BMI quickly and easily without having to do these calculations yourself.  To calculate your BMI in English ([place] measurements:    Measure your weight in pounds (lb).  Multiply the number of pounds by 703.  For example, for a person who weighs 180 lb, multiply that number by 703, which equals 126,540.  Measure your height in inches. Then multiply that number by itself to get a measurement called \"inches squared.\"  For example, for a person who is 70 inches tall, the \"inches squared\" measurement is 70 inches x 70 inches, which equals 4,900 inches squared.  Divide the total from step 2 (number of lb x 703) by the total from step 3 (inches squared): 126,540 ÷ 4,900 = 25.8. This is your BMI.  To calculate your BMI in metric measurements:  Measure your weight in kilograms (kg).  Measure your height in meters (m). Then multiply that number by itself to get a measurement called \"meters squared.\"  For example, for a person who is 1.75 m tall, the \"meters squared\" measurement is 1.75 m x 1.75 m, which is equal to 3.1 meters squared.  Divide the number of kilograms (your weight) by the meters squared number. In this example: 70 ÷ 3.1 = 22.6. This is your BMI.  What do the results mean?  BMI charts " are used to identify whether you are underweight, normal weight, overweight, or obese. The following guidelines will be used:  Underweight: BMI less than 18.5.  Normal weight: BMI between 18.5 and 24.9.  Overweight: BMI between 25 and 29.9.  Obese: BMI of 30 or above.  Keep these notes in mind:  Weight includes both fat and muscle, so someone with a muscular build, such as an athlete, may have a BMI that is higher than 24.9. In cases like these, BMI is not an accurate measure of body fat.  To determine if excess body fat is the cause of a BMI of 25 or higher, further assessments may need to be done by a health care provider.  BMI is usually interpreted in the same way for men and women.  Where to find more information  For more information about BMI, including tools to quickly calculate your BMI, go to these websites:  Centers for Disease Control and Prevention: www.cdc.gov  American Heart Association: www.heart.org  National Heart, Lung, and Blood [place]: www.nhlbi.nih.gov  Summary  Body mass index (BMI) is a number that is calculated from a person's weight and height.  BMI may help estimate how much of a person's weight is composed of fat. BMI can help identify those who may be at higher risk for certain medical problems.  BMI can be measured using English measurements or metric measurements.  BMI charts are used to identify whether you are underweight, normal weight, overweight, or obese.  This information is not intended to replace advice given to you by your health care provider. Make sure you discuss any questions you have with your health care provider.  Document Revised: 09/09/2020 Document Reviewed: 07/17/2020  [employer] Patient Education © 2022 [employer]  BMI for Adults  What is BMI?  Body mass index (BMI) is a number that is calculated from a person's weight and height. BMI can help estimate how much of a person's weight is composed of fat. BMI does not measure body fat directly. Rather, it is an  "alternative to procedures that directly measure body fat, which can be difficult and expensive.  BMI can help identify people who may be at higher risk for certain medical problems.  What are BMI measurements used for?  BMI is used as a screening tool to identify possible weight problems. It helps determine whether a person is obese, overweight, a healthy weight, or underweight.  BMI is useful for:  Identifying a weight problem that may be related to a medical condition or may increase the risk for medical problems.  Promoting changes, such as changes in diet and exercise, to help reach a healthy weight. BMI screening can be repeated to see if these changes are working.  How is BMI calculated?  BMI involves measuring your weight in relation to your height. Both height and weight are measured, and the BMI is calculated from those numbers. This can be done either in English ([place] or metric measurements. Note that charts and online BMI calculators are available to help you find your BMI quickly and easily without having to do these calculations yourself.  To calculate your BMI in English ([place] measurements:    Measure your weight in pounds (lb).  Multiply the number of pounds by 703.  For example, for a person who weighs 180 lb, multiply that number by 703, which equals 126,540.  Measure your height in inches. Then multiply that number by itself to get a measurement called \"inches squared.\"  For example, for a person who is 70 inches tall, the \"inches squared\" measurement is 70 inches x 70 inches, which equals 4,900 inches squared.  Divide the total from step 2 (number of lb x 703) by the total from step 3 (inches squared): 126,540 ÷ 4,900 = 25.8. This is your BMI.  To calculate your BMI in metric measurements:  Measure your weight in kilograms (kg).  Measure your height in meters (m). Then multiply that number by itself to get a measurement called \"meters squared.\"  For example, for a person who is 1.75 m tall, the " "\"meters squared\" measurement is 1.75 m x 1.75 m, which is equal to 3.1 meters squared.  Divide the number of kilograms (your weight) by the meters squared number. In this example: 70 ÷ 3.1 = 22.6. This is your BMI.  What do the results mean?  BMI charts are used to identify whether you are underweight, normal weight, overweight, or obese. The following guidelines will be used:  Underweight: BMI less than 18.5.  Normal weight: BMI between 18.5 and 24.9.  Overweight: BMI between 25 and 29.9.  Obese: BMI of 30 or above.  Keep these notes in mind:  Weight includes both fat and muscle, so someone with a muscular build, such as an athlete, may have a BMI that is higher than 24.9. In cases like these, BMI is not an accurate measure of body fat.  To determine if excess body fat is the cause of a BMI of 25 or higher, further assessments may need to be done by a health care provider.  BMI is usually interpreted in the same way for men and women.  Where to find more information  For more information about BMI, including tools to quickly calculate your BMI, go to these websites:  Centers for Disease Control and Prevention: www.cdc.gov  American Heart Association: www.heart.org  National Heart, Lung, and Blood [place]: www.nhlbi.nih.gov  Summary  Body mass index (BMI) is a number that is calculated from a person's weight and height.  BMI may help estimate how much of a person's weight is composed of fat. BMI can help identify those who may be at higher risk for certain medical problems.  BMI can be measured using English measurements or metric measurements.  BMI charts are used to identify whether you are underweight, normal weight, overweight, or obese.  This information is not intended to replace advice given to you by your health care provider. Make sure you discuss any questions you have with your health care provider.  Document Revised: 09/09/2020 Document Reviewed: 07/17/2020  [employer] Patient Education © 2022 [employer] " Inc[employer]  Exercising to Lose Weight  Getting regular exercise is important for everyone. It is especially important if you are overweight. Being overweight increases your risk of heart disease, stroke, diabetes, high blood pressure, and several types of cancer. Exercising, and reducing the calories you consume, can help you lose weight and improve fitness and health.  Exercise can be moderate or vigorous intensity. To lose weight, most people need to do a certain amount of moderate or vigorous-intensity exercise each week.  How can exercise affect me?  You lose weight when you exercise enough to burn more calories than you eat. Exercise also reduces body fat and builds muscle. The more muscle you have, the more calories you burn. Exercise also:  Improves mood.  Reduces stress and tension.  Improves your overall fitness, flexibility, and endurance.  Increases bone strength.  Moderate-intensity exercise  A person riding a bicycle wearing a safety helmet.      Moderate-intensity exercise is any activity that gets you moving enough to burn at least three times more energy (calories) than if you were sitting.  Examples of moderate exercise include:  Walking a mile in 15 minutes.  Doing light yard work.  Biking at an easy pace.  Most people should get at least 150 minutes of moderate-intensity exercise a week to maintain their body weight.  Vigorous-intensity exercise  Vigorous-intensity exercise is any activity that gets you moving enough to burn at least six times more calories than if you were sitting. When you exercise at this intensity, you should be working hard enough that you are not able to carry on a conversation.  Examples of vigorous exercise include:  Running.  Playing a team sport, such as football, basketball, and soccer.  Jumping rope.  Most people should get at least 75 minutes a week of vigorous exercise to maintain their body weight.  What actions can I take to lose weight?  The amount of exercise you need  to lose weight depends on:  Your age.  The type of exercise.  Any health conditions you have.  Your overall physical ability.  Talk to your health care provider about how much exercise you need and what types of activities are safe for you.  Nutrition  A plate with healthy, colorful foods.      Make changes to your diet as told by your health care provider or diet and nutrition specialist (dietitian). This may include:  Eating fewer calories.  Eating more protein.  Eating less unhealthy fats.  Eating a diet that includes fresh fruits and vegetables, whole grains, low-fat dairy products, and lean protein.  Avoiding foods with added fat, salt, and sugar.  Drink plenty of water while you exercise to prevent dehydration or heat stroke.  Activity  Choose an activity that you enjoy and set realistic goals. Your health care provider can help you make an exercise plan that works for you.  Exercise at a moderate or vigorous intensity most days of the week.  The intensity of exercise may vary from person to person. You can tell how intense a workout is for you by paying attention to your breathing and heartbeat. Most people will notice their breathing and heartbeat get faster with more intense exercise.  Do resistance training twice each week, such as:  Push-ups.  Sit-ups.  Lifting weights.  Using resistance bands.  Getting short amounts of exercise can be just as helpful as long, structured periods of exercise. If you have trouble finding time to exercise, try doing these things as part of your daily routine:  Get up, stretch, and walk around every 30 minutes throughout the day.  Go for a walk during your lunch break.  Park your car farther away from your destination.  If you take public transportation, get off one stop early and walk the rest of the way.  Make phone calls while standing up and walking around.  Take the stairs instead of elevators or escalators.  Wear comfortable clothes and shoes with good support.  Do not  exercise so much that you hurt yourself, feel dizzy, or get very short of breath.  Where to find more information  [place] Department of Health and Human Services: www.hhs.gov  Centers for Disease Control and Prevention: www.cdc.gov  Contact a health care provider:  Before starting a new exercise program.  If you have questions or concerns about your weight.  If you have a medical problem that keeps you from exercising.  Get help right away if:  You have any of the following while exercising:  Injury.  Dizziness.  Difficulty breathing or shortness of breath that does not go away when you stop exercising.  Chest pain.  Rapid heartbeat.  These symptoms may represent a serious problem that is an emergency. Do not wait to see if the symptoms will go away. Get medical help right away. Call your local emergency services (911 in the [place] Do not drive yourself to the hospital.  Summary  Getting regular exercise is especially important if you are overweight.  Being overweight increases your risk of heart disease, stroke, diabetes, high blood pressure, and several types of cancer.  Losing weight happens when you burn more calories than you eat.  Reducing the amount of calories you eat, and getting regular moderate or vigorous exercise each week, helps you lose weight.  This information is not intended to replace advice given to you by your health care provider. Make sure you discuss any questions you have with your health care provider.

## 2023-05-26 NOTE — PROGRESS NOTES
[facility]     Mildred French  1970   [id number]      Patient Care Team:  Roula Clayton MD as PCP - General (Internal Medicine)  Reinaldo Oneil MD as Consulting Physician (Neurology)  Reinaldo Oneil MD as Consulting Physician (Neurology)    Chief Complaint   Patient presents with   • Lower Extremity Issue     Pt states there is an indent in the left hip. It started in January and has turned into a deep indent            HPI  Patient is a 53 y.o. female who presents today for a follow-up.    Left thigh atrophy  The patient states that in 12/2022, everything was symmetrical. She states that in 01/2023 or 02/2023, she started losing fat or bone on her left hip. The patient states her aunt lost her hips and buttocks at some point. She had a steroid injection before Christmas 2022. She has had some aching down her leg twice and a few times in her right leg. The patient states her left leg has always been shorter than her right leg, but it seems to have worsened. She denies any low back pain. The patient has been more active. She had changed all her video tapes changed over to DVD because her son passed away. She was bending up and down a lot to do the DVD.    Anxiety and depression  The patient states that her anxiety and depression is pretty bad. She states that she talked to Dr. Cool about it. The patient is afraid to do anything because she thinks she is as good as she is going to get. She does not want the Wellbutrin because it makes her gain weight. The patient tried buspirone, but she did not like it. She is on Geodon, lamotrigine, Vyvanse for ADD, and Xanax. The patient does not want to change her medications. She is working part-time and enjoys her work. All she does is work, watch TV, and sleep because she cannot do anymore than that. If she does more than that, she cannot work. She goes outside and walks a little bit. The patient denies suicidal ideation.  She is concerned  about the health of her 13-year-old dog.         CHRONIC CONDITIONS      Past Medical History:   Diagnosis Date   • Allergic    • Anemia    • Anxiety    • B12 deficiency 01/15/2019    Recheck today   • Bipolar II disorder    • Fibromyalgia    • GERD (gastroesophageal reflux disease)    • Goiter    • Iron deficiency 2019 Roula Clayton MD  Continue taking 2 of the [place] vitamins with iron daily.   • Major depressive disorder    • Migraines     4-5 times a week    • Scoliosis    • Sleep apnea        Past Surgical History:   Procedure Laterality Date   • BARIATRIC SURGERY     •  SECTION  1997   •  SECTION  1988   • COLONOSCOPY     • EYE SURGERY  Lasik ?   • TONSILLECTOMY     • WISDOM TOOTH EXTRACTION         Family History   Problem Relation Age of Onset   • Thyroid disease Mother    • Hypertension Mother    • Hyperlipidemia Mother    • Obesity Mother    • Depression Mother    • Obesity Sister    • Depression Sister    • Thyroid disease Sister    • Anxiety disorder Sister    • Thyroid disease Maternal Aunt    • Coronary artery disease Maternal Grandmother    • Thyroid disease Father    • Hyperlipidemia Father    • Hypertension Father    • Vision loss Father    • Diabetes Other    • Sudden death Son 22        Brugada syn   • Heart defect Son 21        Positive for Brugada syndrome.  No structural heart disease.  Asymptomatic   • Breast cancer Neg Hx        Social History     Socioeconomic History   • Marital status:    Tobacco Use   • Smoking status: Never   • Smokeless tobacco: Never   Vaping Use   • Vaping Use: Never used   Substance and Sexual Activity   • Alcohol use: No   • Drug use: No   • Sexual activity: Yes     Partners: Male     Birth control/protection: Essure       Allergies   Allergen Reactions   • Atenolol Other (See Comments)     Suicidal    • Emgality [Galcanezumab-Gnlm] Unknown - Low Severity   • Hydrocodone Unknown (See Comments)  "and Rash     Patient is not aware of a reaction to this drug   Patient is not aware of a reaction to this drug    • Qulipta [Atogepant] Nausea Only     Nausea and sleepiness    • Topamax [Topiramate] Anxiety         Vital Signs  Vitals:    05/26/23 0839   BP: 126/76   BP Location: Left arm   Patient Position: Sitting   Cuff Size: Adult   Pulse: 88   Temp: 97.7 °F (36.5 °C)   TempSrc: Infrared   SpO2: 96%   Weight: 67.8 kg (149 lb 6.4 oz)   Height: 162.6 cm (64.02\")     Body mass index is 25.63 kg/m².  BMI is >= 25 and <30. (Overweight) The following options were offered after discussion;: weight loss educational material (shared in after visit summary), exercise counseling/recommendations and nutrition counseling/recommendations        Current Outpatient Medications:   •  ALPRAZolam (XANAX) 1 MG tablet, Take 1 tablet by mouth Daily As Needed for Anxiety., Disp: , Rfl:   •  ALPRAZolam XR (XANAX XR) 1 MG 24 hr tablet, Take 1 tablet by mouth Every Morning., Disp: , Rfl:   •  bisacodyl 5 MG EC tablet, Take 1 tablet by mouth Daily As Needed for Constipation., Disp: 30 tablet, Rfl: 0  •  Cholecalciferol (vitamin D3) 125 MCG (5000 UT) tablet tablet, Take 1 tablet by mouth Daily., Disp: 90 tablet, Rfl: 3  •  cyclobenzaprine (FLEXERIL) 10 MG tablet, TAKE 1 TABLET BY MOUTH 2 TIMES A DAY AS NEEDED FOR MUSCLE SPASMS., Disp: 60 tablet, Rfl: 2  •  famotidine (PEPCID) 40 MG tablet, TAKE 1 TABLET BY MOUTH TWICE A DAY, Disp: 180 tablet, Rfl: 1  •  fluconazole (DIFLUCAN) 150 MG tablet, Take 1 tablet by mouth Daily As Needed (yeast vaginitis)., Disp: 3 tablet, Rfl: 0  •  fluticasone (FLONASE) 50 MCG/ACT nasal spray, 1 spray into the nostril(s) as directed by provider Every Night. (Patient taking differently: 1 spray into the nostril(s) as directed by provider Daily As Needed.), Disp: 16 mL, Rfl: 5  •  Iron-Vitamin C  MG tablet, Take 1 tablet by mouth 2 (Two) Times a Day., Disp: , Rfl:   •  lamoTRIgine (LaMICtal) 150 MG tablet, " Take 2 tablets by mouth Every Morning., Disp: , Rfl: 1  •  Lasmiditan Succinate (Reyvow) 100 MG tablet, Take 100 mg by mouth 1 (One) Time As Needed (migraine) for up to 1 dose. Do not drive for 8 hours after taking medication., Disp: 8 tablet, Rfl: 5  •  loratadine (CLARITIN) 10 MG tablet, Take 1 tablet by mouth Daily., Disp: 90 tablet, Rfl: 1  •  naproxen (NAPROSYN) 500 MG tablet, TAKE 1 TABLET BY MOUTH TWICE A DAY WITH MEALS, Disp: 60 tablet, Rfl: 4  •  polyethylene glycol (GlycoLax) 17 GM/SCOOP powder, Take 17 g by mouth 2 (Two) Times a Day. (Patient taking differently: Take 17 g by mouth 2 (Two) Times a Day As Needed.), Disp: 850 g, Rfl: 11  •  promethazine (PHENERGAN) 25 MG tablet, Take 1 tablet by mouth Every 8 (Eight) Hours As Needed for Nausea or Vomiting., Disp: 12 tablet, Rfl: 3  •  SUMAtriptan (IMITREX) 100 MG tablet, Take one tablet at onset of headache. May repeat dose one time in 2 hours if headache not relieved., Disp: 18 tablet, Rfl: 8  •  VYVANSE 70 MG capsule, Take 1 capsule by mouth Every Morning, Disp: , Rfl:   •  ziprasidone (GEODON) 80 MG capsule, Take 1 capsule by mouth 2 (Two) Times a Day., Disp: , Rfl: 1    Physical Exam:    Physical Exam  Vitals and nursing note reviewed.   Constitutional:       Appearance: She is well-developed.      Comments: Mild overweight   HENT:      Head: Normocephalic.   Eyes:      Conjunctiva/sclera: Conjunctivae normal.      Pupils: Pupils are equal, round, and reactive to light.   Neck:      Thyroid: No thyromegaly.   Cardiovascular:      Rate and Rhythm: Normal rate and regular rhythm.      Heart sounds: Normal heart sounds.   Pulmonary:      Effort: Pulmonary effort is normal.      Breath sounds: Normal breath sounds.   Musculoskeletal:         General: Normal range of motion.      Cervical back: Normal range of motion and neck supple.      Comments: On the exam of the left thigh area, there is atrophy of muscle and fat secondary to a steroid injection. The  area of atrophy measures about 7 cm. There are no worrisome skin changes. No tenderness. This causes her hip area to be asymmetrical which affects her body image. It is causing the patient distress. The steroid injection occurred probably in 12/2022, but was not given in our office.   Lymphadenopathy:      Cervical: No cervical adenopathy.   Skin:     Comments: Fat atrophy on the left hip area above the trochanter.    Neurological:      Mental Status: She is alert and oriented to person, place, and time.   Psychiatric:         Thought Content: Thought content normal.          ACE III MINI        Results Review:    None    CMP:  Lab Results   Component Value Date    BUN 14 07/29/2022    CREATININE 0.71 07/29/2022    EGFRIFNONA 78 06/11/2021    BCR 19.7 07/29/2022     07/29/2022    K 3.9 07/29/2022    CO2 24.0 07/29/2022    CALCIUM 9.1 07/29/2022    ALBUMIN 4.70 07/29/2022    BILITOT <0.2 07/29/2022    ALKPHOS 76 07/29/2022    AST 12 07/29/2022    ALT 7 07/29/2022     HbA1c:  No results found for: HGBA1C  Microalbumin:  No results found for: MICROALBUR, POCMALB, POCCREAT  Lipid Panel  Lab Results   Component Value Date    CHOL 185 07/29/2022    TRIG 91 07/29/2022    HDL 53 07/29/2022     (H) 07/29/2022    AST 12 07/29/2022    ALT 7 07/29/2022       Medication Review: Medications reviewed and noted  Patient Instructions   Problem List Items Addressed This Visit        Cardiac and Vasculature    Hypercholesterolemia    Current Assessment & Plan     She will continue to improve low-fat, healthy diet, and try to increase walking and physical activity.            Mental Health    Generalized anxiety disorder (Chronic)    Overview     Geodon, lamotrigine, Xanax, and Vyvanse.  Regular follow-up with the psychiatrist.             Current Assessment & Plan     She will continue her current medications, lamotrigine and Geodon. She will continue Vyvanse and alprazolam as needed. She will continue regular follow-up  with her psychiatrist. I have encouraged her to get out of the house on days that she is not working and walk a little. Physical activity also helps decrease symptoms of stress, anxiety, and mood.         Relevant Medications    ziprasidone (GEODON) 80 MG capsule    VYVANSE 70 MG capsule    ALPRAZolam XR (XANAX XR) 1 MG 24 hr tablet    ALPRAZolam (XANAX) 1 MG tablet    Major depressive disorder, recurrent, moderate (HCC)    Overview     Geodon, lamotrigine, Xanax, and Vyvanse.  Regular follow-up with the psychiatrist.             Current Assessment & Plan     She will continue her current medications, lamotrigine and Geodon. She will continue Vyvanse and alprazolam as needed. She will continue regular follow-up with her psychiatrist. I have encouraged her to get out of the house on days that she is not working and walk a little. Physical activity also helps decrease symptoms of stress, anxiety, and mood.         Relevant Medications    ziprasidone (GEODON) 80 MG capsule    VYVANSE 70 MG capsule    ALPRAZolam XR (XANAX XR) 1 MG 24 hr tablet    ALPRAZolam (XANAX) 1 MG tablet       Musculoskeletal and Injuries    Atrophy of muscle of left thigh    Current Assessment & Plan     On the exam of the left thigh area, there is atrophy of muscle and fat secondary to a steroid injection. The area of atrophy measures about 7 cm. There are no worrisome skin changes. No tenderness. This causes her hip area to be asymmetrical which affects her body image. It is causing the patient distress. The steroid injection occurred probably in 12/2022, but was not given in our office. We are referring to Dr. Edd Dorsey, plastic surgeon, for evaluation to see if he can even out the asymmetry.         Relevant Orders    Ambulatory Referral to Plastic Surgery (Completed)       Other    Overweight with body mass index (BMI) of 25 to 25.9 in adult (Chronic)    Current Assessment & Plan     She is encouraged to do a little more walking and  exercise. Try to lose about 5 pounds since that will decrease the fatty areas on the sides of her thighs, which will even out the asymmetry a little.         Steroid side effects, initial encounter - Primary    Current Assessment & Plan     On the exam of the left thigh area, there is atrophy of muscle and fat secondary to a steroid injection. The area of atrophy measures about 7 cm. There are no worrisome skin changes. No tenderness. This causes her hip area to be asymmetrical which affects her body image. It is causing the patient distress. The steroid injection occurred probably in 12/2022, but was not given in our office. We are referring to Dr. Edd Dorsey, plastic surgeon, for evaluation to see if he can even out the asymmetry.          Relevant Orders    Ambulatory Referral to Plastic Surgery (Completed)          Diagnosis Plan   1. Steroid side effects, initial encounter  Ambulatory Referral to Plastic Surgery      2. Atrophy of muscle of left thigh  Ambulatory Referral to Plastic Surgery      3. Hypercholesterolemia        4. Major depressive disorder, recurrent, moderate (HCC)        5. Generalized anxiety disorder        6. Overweight with body mass index (BMI) of 25 to 25.9 in adult          She will come back to see me in 07/2023 for her annual physical.        Plan of care reviewed with patient at the conclusion of today's visit. Education was provided regarding diagnosis, management, and any prescribed or recommended OTC medications.Patient verbalizes understanding of and agreement with management plan.         Roula Clayton MD      Transcribed from ambient dictation for Roula Clayton MD by Ashlyn Mack.  05/26/23   10:23 EDT    Patient or patient representative verbalized consent to the visit recording.  I have personally performed the services described in this document as transcribed by the above individual, and it is both accurate and complete.

## 2023-05-26 NOTE — ASSESSMENT & PLAN NOTE
She will continue her current medications, lamotrigine and Geodon. She will continue Vyvanse and alprazolam as needed. She will continue regular follow-up with her psychiatrist. I have encouraged her to get out of the house on days that she is not working and walk a little. Physical activity also helps decrease symptoms of stress, anxiety, and mood.

## 2023-05-26 NOTE — ASSESSMENT & PLAN NOTE
She will continue to improve low-fat, healthy diet, and try to increase walking and physical activity.

## 2023-06-12 RX ORDER — CYCLOBENZAPRINE HCL 10 MG
TABLET ORAL
Qty: 60 TABLET | Refills: 2 | Status: SHIPPED | OUTPATIENT
Start: 2023-06-12

## 2023-07-21 ENCOUNTER — LAB (OUTPATIENT)
Dept: LAB | Facility: HOSPITAL | Age: 53
End: 2023-07-21
Payer: COMMERCIAL

## 2023-07-21 DIAGNOSIS — E55.9 VITAMIN D DEFICIENCY: ICD-10-CM

## 2023-07-21 DIAGNOSIS — E04.2 GOITER, NONTOXIC, MULTINODULAR: ICD-10-CM

## 2023-07-21 DIAGNOSIS — D62 ANEMIA DUE TO BLOOD LOSS, ACUTE: ICD-10-CM

## 2023-07-21 DIAGNOSIS — E78.00 HYPERCHOLESTEROLEMIA: ICD-10-CM

## 2023-07-21 LAB
25(OH)D3 SERPL-MCNC: 61.9 NG/ML (ref 30–100)
ALBUMIN SERPL-MCNC: 4.1 G/DL (ref 3.5–5.2)
ALBUMIN/GLOB SERPL: 1.6 G/DL
ALP SERPL-CCNC: 64 U/L (ref 39–117)
ALT SERPL W P-5'-P-CCNC: 7 U/L (ref 1–33)
ANION GAP SERPL CALCULATED.3IONS-SCNC: 9.1 MMOL/L (ref 5–15)
AST SERPL-CCNC: 12 U/L (ref 1–32)
BACTERIA UR QL AUTO: ABNORMAL /HPF
BASOPHILS # BLD AUTO: 0.08 10*3/MM3 (ref 0–0.2)
BASOPHILS NFR BLD AUTO: 2.1 % (ref 0–1.5)
BILIRUB SERPL-MCNC: 0.2 MG/DL (ref 0–1.2)
BILIRUB UR QL STRIP: ABNORMAL
BUN SERPL-MCNC: 14 MG/DL (ref 6–20)
BUN/CREAT SERPL: 17.5 (ref 7–25)
CALCIUM SPEC-SCNC: 8.9 MG/DL (ref 8.6–10.5)
CHLORIDE SERPL-SCNC: 104 MMOL/L (ref 98–107)
CHOLEST SERPL-MCNC: 163 MG/DL (ref 0–200)
CLARITY UR: CLEAR
CO2 SERPL-SCNC: 22.9 MMOL/L (ref 22–29)
COLOR UR: ABNORMAL
CREAT SERPL-MCNC: 0.8 MG/DL (ref 0.57–1)
DEPRECATED RDW RBC AUTO: 44.6 FL (ref 37–54)
EGFRCR SERPLBLD CKD-EPI 2021: 89.3 ML/MIN/1.73
EOSINOPHIL # BLD AUTO: 0.09 10*3/MM3 (ref 0–0.4)
EOSINOPHIL NFR BLD AUTO: 2.3 % (ref 0.3–6.2)
ERYTHROCYTE [DISTWIDTH] IN BLOOD BY AUTOMATED COUNT: 15 % (ref 12.3–15.4)
FERRITIN SERPL-MCNC: 9.15 NG/ML (ref 13–150)
GLOBULIN UR ELPH-MCNC: 2.6 GM/DL
GLUCOSE SERPL-MCNC: 94 MG/DL (ref 65–99)
GLUCOSE UR STRIP-MCNC: NEGATIVE MG/DL
HCT VFR BLD AUTO: 35 % (ref 34–46.6)
HDLC SERPL-MCNC: 51 MG/DL (ref 40–60)
HGB BLD-MCNC: 10.8 G/DL (ref 12–15.9)
HGB UR QL STRIP.AUTO: NEGATIVE
HYALINE CASTS UR QL AUTO: ABNORMAL /LPF
IMM GRANULOCYTES # BLD AUTO: 0.01 10*3/MM3 (ref 0–0.05)
IMM GRANULOCYTES NFR BLD AUTO: 0.3 % (ref 0–0.5)
IRON 24H UR-MRATE: 26 MCG/DL (ref 37–145)
IRON SATN MFR SERPL: 6 % (ref 20–50)
KETONES UR QL STRIP: ABNORMAL
LDLC SERPL CALC-MCNC: 95 MG/DL (ref 0–100)
LDLC/HDLC SERPL: 1.84 {RATIO}
LEUKOCYTE ESTERASE UR QL STRIP.AUTO: NEGATIVE
LYMPHOCYTES # BLD AUTO: 1.23 10*3/MM3 (ref 0.7–3.1)
LYMPHOCYTES NFR BLD AUTO: 31.6 % (ref 19.6–45.3)
MCH RBC QN AUTO: 25.3 PG (ref 26.6–33)
MCHC RBC AUTO-ENTMCNC: 30.9 G/DL (ref 31.5–35.7)
MCV RBC AUTO: 82 FL (ref 79–97)
MONOCYTES # BLD AUTO: 0.35 10*3/MM3 (ref 0.1–0.9)
MONOCYTES NFR BLD AUTO: 9 % (ref 5–12)
NEUTROPHILS NFR BLD AUTO: 2.13 10*3/MM3 (ref 1.7–7)
NEUTROPHILS NFR BLD AUTO: 54.7 % (ref 42.7–76)
NITRITE UR QL STRIP: NEGATIVE
NRBC BLD AUTO-RTO: 0 /100 WBC (ref 0–0.2)
PH UR STRIP.AUTO: 5.5 [PH] (ref 5–8)
PLATELET # BLD AUTO: 293 10*3/MM3 (ref 140–450)
PMV BLD AUTO: 9.6 FL (ref 6–12)
POTASSIUM SERPL-SCNC: 3.7 MMOL/L (ref 3.5–5.2)
PROT SERPL-MCNC: 6.7 G/DL (ref 6–8.5)
PROT UR QL STRIP: ABNORMAL
RBC # BLD AUTO: 4.27 10*6/MM3 (ref 3.77–5.28)
RBC # UR STRIP: ABNORMAL /HPF
REF LAB TEST METHOD: ABNORMAL
SODIUM SERPL-SCNC: 136 MMOL/L (ref 136–145)
SP GR UR STRIP: >1.03 (ref 1–1.03)
SQUAMOUS #/AREA URNS HPF: ABNORMAL /HPF
T4 FREE SERPL-MCNC: 1.15 NG/DL (ref 0.93–1.7)
TIBC SERPL-MCNC: 417 MCG/DL (ref 298–536)
TRANSFERRIN SERPL-MCNC: 280 MG/DL (ref 200–360)
TRIGL SERPL-MCNC: 91 MG/DL (ref 0–150)
TSH SERPL DL<=0.05 MIU/L-ACNC: 1.7 UIU/ML (ref 0.27–4.2)
UROBILINOGEN UR QL STRIP: ABNORMAL
VLDLC SERPL-MCNC: 17 MG/DL (ref 5–40)
WBC # UR STRIP: ABNORMAL /HPF
WBC NRBC COR # BLD: 3.89 10*3/MM3 (ref 3.4–10.8)

## 2023-07-21 PROCEDURE — 81001 URINALYSIS AUTO W/SCOPE: CPT

## 2023-07-21 PROCEDURE — 80061 LIPID PANEL: CPT

## 2023-07-21 PROCEDURE — 82306 VITAMIN D 25 HYDROXY: CPT

## 2023-07-21 PROCEDURE — 84466 ASSAY OF TRANSFERRIN: CPT

## 2023-07-21 PROCEDURE — 82728 ASSAY OF FERRITIN: CPT

## 2023-07-21 PROCEDURE — 80050 GENERAL HEALTH PANEL: CPT

## 2023-07-21 PROCEDURE — 84439 ASSAY OF FREE THYROXINE: CPT

## 2023-07-21 PROCEDURE — 83540 ASSAY OF IRON: CPT

## 2023-07-28 ENCOUNTER — OFFICE VISIT (OUTPATIENT)
Dept: INTERNAL MEDICINE | Facility: CLINIC | Age: 53
End: 2023-07-28
Payer: COMMERCIAL

## 2023-07-28 VITALS
DIASTOLIC BLOOD PRESSURE: 72 MMHG | HEART RATE: 120 BPM | TEMPERATURE: 97.8 F | WEIGHT: 146 LBS | OXYGEN SATURATION: 98 % | HEIGHT: 64 IN | BODY MASS INDEX: 24.92 KG/M2 | SYSTOLIC BLOOD PRESSURE: 136 MMHG

## 2023-07-28 DIAGNOSIS — M54.2 CERVICALGIA: ICD-10-CM

## 2023-07-28 DIAGNOSIS — D62 ANEMIA DUE TO BLOOD LOSS, ACUTE: ICD-10-CM

## 2023-07-28 DIAGNOSIS — E78.00 HYPERCHOLESTEROLEMIA: ICD-10-CM

## 2023-07-28 DIAGNOSIS — Z00.00 ANNUAL PHYSICAL EXAM: Primary | ICD-10-CM

## 2023-07-28 DIAGNOSIS — G43.109 MIGRAINE WITH AURA AND WITHOUT STATUS MIGRAINOSUS, NOT INTRACTABLE: ICD-10-CM

## 2023-07-28 DIAGNOSIS — E55.9 VITAMIN D DEFICIENCY: ICD-10-CM

## 2023-07-28 DIAGNOSIS — F41.1 GENERALIZED ANXIETY DISORDER: Chronic | ICD-10-CM

## 2023-07-28 DIAGNOSIS — K59.01 SLOW TRANSIT CONSTIPATION: ICD-10-CM

## 2023-07-28 DIAGNOSIS — E66.3 OVERWEIGHT WITH BODY MASS INDEX (BMI) OF 25 TO 25.9 IN ADULT: Chronic | ICD-10-CM

## 2023-07-28 DIAGNOSIS — F33.1 MAJOR DEPRESSIVE DISORDER, RECURRENT, MODERATE: ICD-10-CM

## 2023-07-28 PROBLEM — M79.651 PAIN OF RIGHT THIGH: Chronic | Status: ACTIVE | Noted: 2023-07-28

## 2023-07-28 PROCEDURE — 90750 HZV VACC RECOMBINANT IM: CPT | Performed by: INTERNAL MEDICINE

## 2023-07-28 PROCEDURE — 99396 PREV VISIT EST AGE 40-64: CPT | Performed by: INTERNAL MEDICINE

## 2023-07-28 PROCEDURE — 90471 IMMUNIZATION ADMIN: CPT | Performed by: INTERNAL MEDICINE

## 2023-07-28 PROCEDURE — 99214 OFFICE O/P EST MOD 30 MIN: CPT | Performed by: INTERNAL MEDICINE

## 2023-07-28 PROCEDURE — 93000 ELECTROCARDIOGRAM COMPLETE: CPT | Performed by: INTERNAL MEDICINE

## 2023-08-09 RX ORDER — FAMOTIDINE 40 MG/1
TABLET, FILM COATED ORAL
Qty: 180 TABLET | Refills: 1 | Status: SHIPPED | OUTPATIENT
Start: 2023-08-09

## 2023-08-29 DIAGNOSIS — G43.109 MIGRAINE WITH AURA AND WITHOUT STATUS MIGRAINOSUS, NOT INTRACTABLE: ICD-10-CM

## 2023-08-29 RX ORDER — PROMETHAZINE HYDROCHLORIDE 25 MG/1
25 TABLET ORAL EVERY 8 HOURS PRN
Qty: 12 TABLET | Refills: 3 | Status: SHIPPED | OUTPATIENT
Start: 2023-08-29

## 2023-08-29 RX ORDER — SUMATRIPTAN 100 MG/1
TABLET, FILM COATED ORAL
Qty: 18 TABLET | Refills: 3 | Status: SHIPPED | OUTPATIENT
Start: 2023-08-29

## 2023-08-29 NOTE — TELEPHONE ENCOUNTER
Rx Refill Note  Requested Prescriptions     Pending Prescriptions Disp Refills    promethazine (PHENERGAN) 25 MG tablet 12 tablet 3     Sig: Take 1 tablet by mouth Every 8 (Eight) Hours As Needed for Nausea or Vomiting.    SUMAtriptan (IMITREX) 100 MG tablet 18 tablet 8     Sig: Take one tablet at onset of headache. May repeat dose one time in 2 hours if headache not relieved.      Last office visit with prescribing clinician: 7/28/2023   Last telemedicine visit with prescribing clinician: Visit date not found   Next office visit with prescribing clinician: Visit date not found                         Would you like a call back once the refill request has been completed: [] Yes [] No    If the office needs to give you a call back, can they leave a voicemail: [] Yes [] No    Bridget Bustamante MA  08/29/23, 14:03 EDT

## 2023-09-19 DIAGNOSIS — G43.109 MIGRAINE WITH AURA AND WITHOUT STATUS MIGRAINOSUS, NOT INTRACTABLE: ICD-10-CM

## 2023-09-20 RX ORDER — LORATADINE 10 MG/1
10 TABLET ORAL DAILY
Qty: 90 TABLET | Refills: 1 | Status: SHIPPED | OUTPATIENT
Start: 2023-09-20

## 2023-09-20 RX ORDER — SUMATRIPTAN 100 MG/1
TABLET, FILM COATED ORAL
Qty: 18 TABLET | Refills: 3 | Status: SHIPPED | OUTPATIENT
Start: 2023-09-20

## 2023-09-20 NOTE — TELEPHONE ENCOUNTER
Rx Refill Note  Requested Prescriptions     Pending Prescriptions Disp Refills    SUMAtriptan (IMITREX) 100 MG tablet 18 tablet 3     Sig: Take one tablet at onset of headache. May repeat dose one time in 2 hours if headache not relieved.      Last office visit with prescribing clinician: Visit date not found   Last telemedicine visit with prescribing clinician: Visit date not found   Next office visit with prescribing clinician: Visit date not found                         Would you like a call back once the refill request has been completed: [] Yes [] No    If the office needs to give you a call back, can they leave a voicemail: [] Yes [] No    Izabel Ireland LPN  09/20/23, 09:03 EDT

## 2023-09-29 ENCOUNTER — FLU SHOT (OUTPATIENT)
Dept: INTERNAL MEDICINE | Facility: CLINIC | Age: 53
End: 2023-09-29
Payer: COMMERCIAL

## 2023-09-29 DIAGNOSIS — Z23 NEED FOR INFLUENZA VACCINATION: Primary | ICD-10-CM

## 2023-09-29 PROCEDURE — 90686 IIV4 VACC NO PRSV 0.5 ML IM: CPT | Performed by: INTERNAL MEDICINE

## 2023-09-29 PROCEDURE — 90471 IMMUNIZATION ADMIN: CPT | Performed by: INTERNAL MEDICINE

## 2023-10-06 RX ORDER — CYCLOBENZAPRINE HCL 10 MG
TABLET ORAL
Qty: 60 TABLET | Refills: 2 | Status: SHIPPED | OUTPATIENT
Start: 2023-10-06

## 2023-10-10 ENCOUNTER — PATIENT MESSAGE (OUTPATIENT)
Dept: INTERNAL MEDICINE | Facility: CLINIC | Age: 53
End: 2023-10-10
Payer: COMMERCIAL

## 2023-10-10 NOTE — TELEPHONE ENCOUNTER
From: Mildred French  To: Roula Clayton  Sent: 10/10/2023 2:09 PM EDT  Subject: Referral    [facility] Dr. Clayton. At my last visit, I showed you my hip and the muscle atrophy that happened due to a steroid shot. You referred me to a plastic surgeon. I have forgotten his name and info. Could you please send me this info? Also, do you think that insurance will cover the cost of correcting my hip, as it was a medical procedure that caused the problem? Thank you. Mildred

## 2023-10-27 DIAGNOSIS — B37.31 YEAST VAGINITIS: ICD-10-CM

## 2023-10-27 RX ORDER — FLUCONAZOLE 150 MG/1
150 TABLET ORAL DAILY PRN
Qty: 3 TABLET | Refills: 0 | Status: SHIPPED | OUTPATIENT
Start: 2023-10-27

## 2023-10-27 NOTE — TELEPHONE ENCOUNTER
Rx Refill Note  Requested Prescriptions     Pending Prescriptions Disp Refills    fluconazole (DIFLUCAN) 150 MG tablet 3 tablet 0     Sig: Take 1 tablet by mouth Daily As Needed (yeast vaginitis).      Last office visit with prescribing clinician: 7/28/2023   Last telemedicine visit with prescribing clinician: Visit date not found   Next office visit with prescribing clinician: Visit date not found                         Would you like a call back once the refill request has been completed: [] Yes [] No    If the office needs to give you a call back, can they leave a voicemail: [] Yes [] No    Patricio Gallego MA  10/27/23, 13:22 EDT

## 2023-11-28 DIAGNOSIS — G43.109 MIGRAINE WITH AURA AND WITHOUT STATUS MIGRAINOSUS, NOT INTRACTABLE: ICD-10-CM

## 2023-11-29 RX ORDER — SUMATRIPTAN 100 MG/1
TABLET, FILM COATED ORAL
Qty: 18 TABLET | Refills: 3 | Status: SHIPPED | OUTPATIENT
Start: 2023-11-29

## 2023-12-29 RX ORDER — CYCLOBENZAPRINE HCL 10 MG
TABLET ORAL
Qty: 60 TABLET | Refills: 2 | Status: SHIPPED | OUTPATIENT
Start: 2023-12-29

## 2024-01-09 RX ORDER — ERGOCALCIFEROL 1.25 MG/1
50000 CAPSULE ORAL
Qty: 4 CAPSULE | Refills: 3 | Status: SHIPPED | OUTPATIENT
Start: 2024-01-09

## 2024-01-09 NOTE — TELEPHONE ENCOUNTER
Rx Refill Note  Requested Prescriptions     Pending Prescriptions Disp Refills    vitamin D (ERGOCALCIFEROL) 1.25 MG (11556 UT) capsule capsule [Pharmacy Med Name: VITAMIN D2 1.25MG(50,000 UNIT)] 4 capsule 3     Sig: TAKE 1 CAPSULE BY MOUTH 1 TIME PER WEEK.      Last office visit with prescribing clinician: 7/28/23   Last telemedicine visit with prescribing clinician: Visit date not found   Next office visit with prescribing clinician: Visit date not found                         Would you like a call back once the refill request has been completed: [] Yes [] No    If the office needs to give you a call back, can they leave a voicemail: [] Yes [] No    Yaritza Otoole LPN  01/09/24, 15:56 EST

## 2024-01-31 RX ORDER — FAMOTIDINE 40 MG/1
TABLET, FILM COATED ORAL
Qty: 180 TABLET | Refills: 1 | Status: SHIPPED | OUTPATIENT
Start: 2024-01-31

## 2024-02-02 RX ORDER — FLUTICASONE PROPIONATE 50 MCG
2 SPRAY, SUSPENSION (ML) NASAL NIGHTLY
Qty: 16 ML | Refills: 5 | Status: SHIPPED | OUTPATIENT
Start: 2024-02-02

## 2024-02-02 NOTE — TELEPHONE ENCOUNTER
Rx Refill Note  Requested Prescriptions     Pending Prescriptions Disp Refills    fluticasone (FLONASE) 50 MCG/ACT nasal spray [Pharmacy Med Name: FLUTICASONE PROP 50 MCG SPRAY] 16 mL 5     Sig: USE 1 SPRAY INTO THE NOSTRIL(S) AS DIRECTED BY PROVIDER EVERY NIGHT.      Last office visit with prescribing clinician: 7/28/2023   Last telemedicine visit with prescribing clinician: Visit date not found   Next office visit with prescribing clinician: Visit date not found                         Would you like a call back once the refill request has been completed: [] Yes [] No    If the office needs to give you a call back, can they leave a voicemail: [] Yes [] No    Josefina Lenz RN  02/02/24, 10:52 EST

## 2024-02-16 RX ORDER — LORATADINE 10 MG/1
10 TABLET ORAL DAILY
Qty: 90 TABLET | Refills: 1 | Status: SHIPPED | OUTPATIENT
Start: 2024-02-16

## 2024-02-21 DIAGNOSIS — G43.011 INTRACTABLE MIGRAINE WITHOUT AURA AND WITH STATUS MIGRAINOSUS: ICD-10-CM

## 2024-02-21 RX ORDER — LASMIDITAN 100 MG/1
100 TABLET ORAL ONCE AS NEEDED
Qty: 8 TABLET | Refills: 0 | Status: SHIPPED | OUTPATIENT
Start: 2024-02-21

## 2024-02-29 DIAGNOSIS — G43.109 MIGRAINE WITH AURA AND WITHOUT STATUS MIGRAINOSUS, NOT INTRACTABLE: ICD-10-CM

## 2024-02-29 RX ORDER — LORATADINE 10 MG/1
10 TABLET ORAL DAILY
Qty: 90 TABLET | Refills: 1 | Status: SHIPPED | OUTPATIENT
Start: 2024-02-29

## 2024-02-29 RX ORDER — SUMATRIPTAN 100 MG/1
TABLET, FILM COATED ORAL
Qty: 18 TABLET | Refills: 3 | Status: SHIPPED | OUTPATIENT
Start: 2024-02-29

## 2024-03-19 RX ORDER — PROMETHAZINE HYDROCHLORIDE 25 MG/1
25 TABLET ORAL EVERY 8 HOURS PRN
Qty: 12 TABLET | Refills: 3 | Status: SHIPPED | OUTPATIENT
Start: 2024-03-19

## 2024-03-19 NOTE — TELEPHONE ENCOUNTER
Rx Refill Note  Requested Prescriptions     Pending Prescriptions Disp Refills    promethazine (PHENERGAN) 25 MG tablet [Pharmacy Med Name: PROMETHAZINE 25 MG TABLET] 12 tablet 3     Sig: TAKE 1 TABLET BY MOUTH EVERY 8 HOURS AS NEEDED FOR NAUSEA OR VOMITING.      Last office visit with prescribing clinician: 7/28/23  Last telemedicine visit with prescribing clinician: Visit date not found   Next office visit with prescribing clinician: none                        Would you like a call back once the refill request has been completed: [] Yes [] No    If the office needs to give you a call back, can they leave a voicemail: [] Yes [] No    Josefina Lenz RN  03/19/24, 14:55 EDT

## 2024-03-21 DIAGNOSIS — G43.109 MIGRAINE WITH AURA AND WITHOUT STATUS MIGRAINOSUS, NOT INTRACTABLE: ICD-10-CM

## 2024-03-21 RX ORDER — NAPROXEN 500 MG/1
500 TABLET ORAL 2 TIMES DAILY WITH MEALS
Qty: 60 TABLET | Refills: 4 | Status: SHIPPED | OUTPATIENT
Start: 2024-03-21

## 2024-04-04 RX ORDER — ISOPROPYL ALCOHOL 91 %
1 SPRAY, NON-AEROSOL (ML) TOPICAL DAILY
Qty: 90 CAPSULE | Refills: 3 | Status: SHIPPED | OUTPATIENT
Start: 2024-04-04

## 2024-04-04 NOTE — TELEPHONE ENCOUNTER
Rx Refill Note  Requested Prescriptions     Pending Prescriptions Disp Refills    [employer] D3 125 MCG (5000 UT) capsule capsule [Pharmacy Med Name: [facility] VITAMIN D3 125 MCG SOFTGEL] 90 capsule 3     Sig: TAKE 1 CAPSULE BY MOUTH EVERY DAY      Last office visit with prescribing clinician: 7/28/2023   Last telemedicine visit with prescribing clinician: Visit date not found   Next office visit with prescribing clinician: Visit date not found                         Would you like a call back once the refill request has been completed: [] Yes [] No    If the office needs to give you a call back, can they leave a voicemail: [] Yes [] No    Sophia Case MA  04/04/24, 08:57 EDT

## 2024-07-02 RX ORDER — CYCLOBENZAPRINE HCL 10 MG
TABLET ORAL
Qty: 60 TABLET | Refills: 2 | Status: SHIPPED | OUTPATIENT
Start: 2024-07-02

## 2024-07-25 DIAGNOSIS — G43.011 INTRACTABLE MIGRAINE WITHOUT AURA AND WITH STATUS MIGRAINOSUS: ICD-10-CM

## 2024-07-25 RX ORDER — LASMIDITAN 100 MG/1
100 TABLET ORAL ONCE AS NEEDED
Qty: 8 TABLET | Refills: 0 | Status: SHIPPED | OUTPATIENT
Start: 2024-07-25

## 2024-08-15 RX ORDER — FAMOTIDINE 40 MG/1
40 TABLET, FILM COATED ORAL 2 TIMES DAILY
Qty: 180 TABLET | Refills: 1 | Status: SHIPPED | OUTPATIENT
Start: 2024-08-15

## 2024-08-18 DIAGNOSIS — G43.109 MIGRAINE WITH AURA AND WITHOUT STATUS MIGRAINOSUS, NOT INTRACTABLE: ICD-10-CM

## 2024-08-19 NOTE — TELEPHONE ENCOUNTER
Rx Refill Note  Requested Prescriptions     Pending Prescriptions Disp Refills    naproxen (NAPROSYN) 500 MG tablet [Pharmacy Med Name: NAPROXEN 500 MG TABLET] 60 tablet 4     Sig: TAKE 1 TABLET BY MOUTH TWICE A DAY WITH MEALS      Last office visit with prescribing clinician: 7/28/2023   Last telemedicine visit with prescribing clinician: Visit date not found   Next office visit with prescribing clinician: Visit date not found                         Would you like a call back once the refill request has been completed: [] Yes [] No    If the office needs to give you a call back, can they leave a voicemail: [] Yes [] No    Izabel Ireland LPN  08/19/24, 11:42 EDT

## 2024-08-20 RX ORDER — NAPROXEN 500 MG/1
500 TABLET ORAL 2 TIMES DAILY WITH MEALS
Qty: 60 TABLET | Refills: 4 | Status: SHIPPED | OUTPATIENT
Start: 2024-08-20

## 2024-11-05 DIAGNOSIS — J30.1 SEASONAL ALLERGIC RHINITIS DUE TO POLLEN: Primary | ICD-10-CM

## 2024-11-11 RX ORDER — FLUTICASONE PROPIONATE 50 MCG
2 SPRAY, SUSPENSION (ML) NASAL NIGHTLY
Qty: 16 G | Refills: 5 | Status: SHIPPED | OUTPATIENT
Start: 2024-11-11

## 2024-11-11 NOTE — TELEPHONE ENCOUNTER
Rx Refill Note  Requested Prescriptions     Pending Prescriptions Disp Refills    fluticasone (FLONASE) 50 MCG/ACT nasal spray       Sig: Administer 2 sprays into the nostril(s) as directed by provider Every Night.      Last office visit with prescribing clinician: 7/28/2023   Last telemedicine visit with prescribing clinician: Visit date not found   Next office visit with prescribing clinician: 12/13/2024                         Would you like a call back once the refill request has been completed: [] Yes [] No    If the office needs to give you a call back, can they leave a voicemail: [] Yes [] No    Josefina Lenz RN  11/11/24, 15:18 EST

## 2024-12-13 ENCOUNTER — OFFICE VISIT (OUTPATIENT)
Dept: INTERNAL MEDICINE | Facility: CLINIC | Age: 54
End: 2024-12-13
Payer: COMMERCIAL

## 2024-12-13 VITALS
DIASTOLIC BLOOD PRESSURE: 78 MMHG | OXYGEN SATURATION: 99 % | BODY MASS INDEX: 25.37 KG/M2 | HEART RATE: 108 BPM | TEMPERATURE: 97.7 F | HEIGHT: 64 IN | WEIGHT: 148.6 LBS | SYSTOLIC BLOOD PRESSURE: 104 MMHG

## 2024-12-13 DIAGNOSIS — E55.9 VITAMIN D DEFICIENCY: ICD-10-CM

## 2024-12-13 DIAGNOSIS — J30.1 SEASONAL ALLERGIC RHINITIS DUE TO POLLEN: ICD-10-CM

## 2024-12-13 DIAGNOSIS — Z00.00 ANNUAL PHYSICAL EXAM: Primary | ICD-10-CM

## 2024-12-13 DIAGNOSIS — F41.1 GENERALIZED ANXIETY DISORDER: Chronic | ICD-10-CM

## 2024-12-13 DIAGNOSIS — E78.00 HYPERCHOLESTEROLEMIA: ICD-10-CM

## 2024-12-13 DIAGNOSIS — G43.109 MIGRAINE WITH AURA AND WITHOUT STATUS MIGRAINOSUS, NOT INTRACTABLE: ICD-10-CM

## 2024-12-13 DIAGNOSIS — R41.840 ATTENTION OR CONCENTRATION DEFICIT: Chronic | ICD-10-CM

## 2024-12-13 DIAGNOSIS — F33.1 MAJOR DEPRESSIVE DISORDER, RECURRENT, MODERATE: ICD-10-CM

## 2024-12-13 DIAGNOSIS — E66.3 OVERWEIGHT WITH BODY MASS INDEX (BMI) OF 25 TO 25.9 IN ADULT: Chronic | ICD-10-CM

## 2024-12-13 PROCEDURE — 99396 PREV VISIT EST AGE 40-64: CPT | Performed by: INTERNAL MEDICINE

## 2024-12-13 PROCEDURE — 93000 ELECTROCARDIOGRAM COMPLETE: CPT | Performed by: INTERNAL MEDICINE

## 2024-12-13 RX ORDER — BUPROPION HYDROCHLORIDE 150 MG/1
2 TABLET, EXTENDED RELEASE ORAL DAILY
COMMUNITY
Start: 2024-12-07

## 2024-12-13 RX ORDER — SUMATRIPTAN SUCCINATE 100 MG/1
TABLET ORAL
Qty: 18 TABLET | Refills: 3 | Status: SHIPPED | OUTPATIENT
Start: 2024-12-13

## 2024-12-13 NOTE — PATIENT INSTRUCTIONS
Patient Instructions  Problem List Items Addressed This Visit          Cardiac and Vasculature    Hypercholesterolemia    Relevant Orders    ECG 12 Lead    CBC & Differential    Comprehensive Metabolic Panel    Lipid Panel    Microalbumin / Creatinine Urine Ratio - Urine, Clean Catch    Urinalysis With Microscopic - Urine, Clean Catch    TSH    Hemoglobin A1c    Vitamin B12       Endocrine and Metabolic    Vitamin D deficiency    Relevant Orders    Vitamin D,25-Hydroxy       Health Encounters    Annual physical exam - Primary       Mental Health    Generalized anxiety disorder (Chronic)    Overview     Geodon, lamotrigine, Xanax, and Vyvanse.  Regular follow-up with the psychiatrist.             Relevant Medications    ziprasidone (GEODON) 80 MG capsule    VYVANSE 70 MG capsule    ALPRAZolam XR (XANAX XR) 1 MG 24 hr tablet    ALPRAZolam (XANAX) 1 MG tablet    buPROPion SR (WELLBUTRIN SR) 150 MG 12 hr tablet    Major depressive disorder, recurrent, moderate    Overview     Geodon, lamotrigine, Xanax, and Vyvanse.  Regular follow-up with the psychiatrist.             Relevant Medications    ziprasidone (GEODON) 80 MG capsule    VYVANSE 70 MG capsule    ALPRAZolam XR (XANAX XR) 1 MG 24 hr tablet    ALPRAZolam (XANAX) 1 MG tablet    buPROPion SR (WELLBUTRIN SR) 150 MG 12 hr tablet       Neuro    Attention or concentration deficit (Chronic)    Migraine with aura and without status migrainosus, not intractable    Overview     Seeing Dr. Oneil for Botox injections.Using sumatriptan and naproxen as needed for acute headaches.             Relevant Medications    lamoTRIgine (LaMICtal) 150 MG tablet    cyclobenzaprine (FLEXERIL) 10 MG tablet    naproxen (NAPROSYN) 500 MG tablet    buPROPion SR (WELLBUTRIN SR) 150 MG 12 hr tablet    SUMAtriptan (IMITREX) 100 MG tablet       Other    Overweight with body mass index (BMI) of 25 to 25.9 in adult (Chronic)       Exercising to Stay Healthy  To become healthy and stay healthy, it is  recommended that you do moderate-intensity and vigorous-intensity exercise. You can tell that you are exercising at a moderate intensity if your heart starts beating faster and you start breathing faster but can still hold a conversation. You can tell that you are exercising at a vigorous intensity if you are breathing much harder and faster and cannot hold a conversation while exercising.  How can exercise benefit me?  Exercising regularly is important. It has many health benefits, such as:  Improving overall fitness, flexibility, and endurance.  Increasing bone density.  Helping with weight control.  Decreasing body fat.  Increasing muscle strength and endurance.  Reducing stress and tension, anxiety, depression, or anger.  Improving overall health.  What guidelines should I follow while exercising?  Before you start a new exercise program, talk with your health care provider.  Do not exercise so much that you hurt yourself, feel dizzy, or get very short of breath.  Wear comfortable clothes and wear shoes with good support.  Drink plenty of water while you exercise to prevent dehydration or heat stroke.  Work out until your breathing and your heartbeat get faster (moderate intensity).  How often should I exercise?  Choose an activity that you enjoy, and set realistic goals. Your health care provider can help you make an activity plan that is individually designed and works best for you.  Exercise regularly as told by your health care provider. This may include:  Doing strength training two times a week, such as:  Lifting weights.  Using resistance bands.  Push-ups.  Sit-ups.  Yoga.  Doing a certain intensity of exercise for a given amount of time. Choose from these options:  A total of 150 minutes of moderate-intensity exercise every week.  A total of 75 minutes of vigorous-intensity exercise every week.  A mix of moderate-intensity and vigorous-intensity exercise every week.  Children, pregnant women, people who  have not exercised regularly, people who are overweight, and older adults may need to talk with a health care provider about what activities are safe to perform. If you have a medical condition, be sure to talk with your health care provider before you start a new exercise program.  What are some exercise ideas?  Moderate-intensity exercise ideas include:  Walking 1 mile (1.6 km) in about 15 minutes.  Biking.  Hiking.  Golfing.  Dancing.  Water aerobics.  Vigorous-intensity exercise ideas include:  Walking 4.5 miles (7.2 km) or more in about 1 hour.  Jogging or running 5 miles (8 km) in about 1 hour.  Biking 10 miles (16.1 km) or more in about 1 hour.  Lap swimming.  Roller-skating or in-line skating.  Cross-country skiing.  Vigorous competitive sports, such as football, basketball, and soccer.  Jumping rope.  Aerobic dancing.  What are some everyday activities that can help me get exercise?  Yard work, such as:  Pushing a .  Raking and bagging leaves.  Washing your car.  Pushing a stroller.  Shoveling snow.  Gardening.  Washing windows or floors.  How can I be more active in my day-to-day activities?  Use stairs instead of an elevator.  Take a walk during your lunch break.  If you drive, park your car farther away from your work or school.  If you take public transportation, get off one stop early and walk the rest of the way.  Stand up or walk around during all of your indoor phone calls.  Get up, stretch, and walk around every 30 minutes throughout the day.  Enjoy exercise with a friend. Support to continue exercising will help you keep a regular routine of activity.  Where to find more information  You can find more information about exercising to stay healthy from:  [place] Department of Health and Human Services: www.hhs.gov  Centers for Disease Control and Prevention (CDC): www.cdc.gov  Summary  Exercising regularly is important. It will improve your overall fitness, flexibility, and  "endurance.  Regular exercise will also improve your overall health. It can help you control your weight, reduce stress, and improve your bone density.  Do not exercise so much that you hurt yourself, feel dizzy, or get very short of breath.  Before you start a new exercise program, talk with your health care provider.  This information is not intended to replace advice given to you by your health care provider. Make sure you discuss any questions you have with your health care provider.  Document Revised: 04/15/2022 Document Reviewed: 04/15/2022  Elsevier Patient Education © 2023 [employer] BMI for Adults  Body mass index (BMI) is a number found using a person's weight and height. BMI can help tell how much of a person's weight is made up of fat. BMI does not measure body fat directly. It is used instead of tests that directly measure body fat, which can be difficult and expensive.  What are BMI measurements used for?  BMI is useful to:  Find out if your weight puts you at higher risk for medical problems.  Help recommend changes, such as in diet and exercise. This can help you reach a healthy weight. BMI screening can be done again to see if these changes are working.  How is BMI calculated?  Your height and weight are measured. The BMI is found from those numbers. This can be done with [place] or metric measurements. Note that charts and online BMI calculators are available to help you find your BMI quickly and easily without doing these calculations.  To calculate your BMI in [place] measurements:  Measure your weight in pounds (lb).  Multiply the number of pounds by 703.  So, for an adult who weighs 150 lb, multiply that number by 703: 150 x 703, which equals 105,450.  Measure your height in inches. Then multiply that number by itself to get a measurement called \"inches squared.\"  So, for an adult who is 70 inches tall, the \"inches squared\" measurement is 70 inches x 70 inches, which equals 4,900 inches " "squared.  Divide the total from step 2 (number of lb x 703) by the total from step 3 (inches squared): 105,450 ÷ 4,900 = 21.5. This is your BMI.  To calculate your BMI in metric measurements:    Measure your weight in kilograms (kg).  For this example, the weight is 70 kg.  Measure your height in meters (m). Then multiply that number by itself to get a measurement called \"meters squared.\"  So, for an adult who is 1.75 m tall, the \"meters squared\" measurement is 1.75 m x 1.75 m, which equals 3.1 meters squared.  Divide the number of kilograms (your weight) by the meters squared number. In this example: 70 ÷ 3.1 = 22.6. This is your BMI.  What do the results mean?  BMI charts are used to see if you are underweight, normal weight, overweight, or obese. The following guidelines will be used:  Underweight: BMI less than 18.5.  Normal weight: BMI between 18.5 and 24.9.  Overweight: BMI between 25 and 29.9.  Obese: BMI of 30 or above.  BMI is a tool and cannot diagnose a condition. Talk with your health care provider about what your BMI means for you. Keep these notes in mind:  Weight includes fat and muscle. Someone with a muscular build, such as an athlete, may have a BMI that is higher than 24.9. In cases like these, BMI is not a correct measure of body fat.  If you have a BMI of 25 or higher, your provider may need to do more testing to find out if excess body fat is the cause.  BMI is measured the same way for males and females. Females usually have more body fat than males of the same height and weight.  Where to find more information  For more information about BMI, including tools to quickly find your BMI, go to:  Centers for Disease Control and Prevention: cdc.gov  American Heart Association: heart.org  National Heart, Lung, and [employer] [place]: nhlbi.nih.gov  This information is not intended to replace advice given to you by your health care provider. Make sure you discuss any questions you have with your health " care provider.  Document Revised: 09/07/2023 Document Reviewed: 08/31/2023  Elsevier Patient Education © 2024 [employer]

## 2024-12-13 NOTE — PROGRESS NOTES
Preventative Annual Visit    Mildred French  1970   [id number]    Patient Care Team:  Roula Clayton MD as PCP - General (Internal Medicine)  Reinaldo Oneil MD as Consulting Physician (Neurology)  Reinaldo Oneil MD as Consulting Physician (Neurology)  Reynaldo Cool MD as Consulting Physician (Psychiatry)    Chief Complaint::   Chief Complaint   Patient presents with    Hyperlipidemia    Allergies    Annual Exam        Subjective   History of Present Illness  The patient presents for an annual physical.    She has been experiencing sinus issues since receiving the COVID-19 vaccine in August or September 2021, despite no prior history of allergies or sinus problems. She has been advised to consult an allergist and has scheduled an appointment for April 2025 in [place]. She will be getting allergy injections at [facility]. Allergy testing has not been performed yet. Initial treatment with Claritin provided temporary relief, but subsequent use of Flonase, Zyrtec, Allegra, and Singulair resulted in anxiety. She discontinued Singulair after 2 days due to this side effect. She also takes Mucinex All-in-One twice daily, which she finds beneficial, and Flonase twice daily without any adverse effects.    She has been receiving Botox injections from Dr. Oneil for her migraines, with the fourth injection administered this morning. She reports that these injections have been beneficial. On particularly severe headache days, she takes all her sinus and migraine medications. She has tried various preventive treatments for her headaches, but none have been effective. She is requesting a refill of her sumatriptan prescription. She also takes naproxen with sumatriptan without any gastrointestinal upset. She rarely uses Reyvow for her migraines as it is not very effective and is expensive.    She experienced a severe episode of vertigo approximately 1 month ago, which resolved after taking sinus medication  and Mucinex. She suspects this may be related to allergies or sinus issues. She reports significant ear pressure and fluid in her ears, with the right ear being more bothersome. An allergist confirmed the presence of fluid in one ear.    She has not yet received her influenza vaccine and plans to get it on Monday. She is up to date on her tetanus, diphtheria, and pertussis vaccines, and has received both doses of the shingles vaccine. She is overdue for her mammogram, which she plans to reschedule for January 2025. She underwent a colonoscopy in 2017 and was advised to repeat it in 10 years, but she wishes to have it done sooner. She does not engage in regular exercise. She had a dermatology check-up last year, during which several spots were treated with cryotherapy. She sees an ophthalmologist annually, with her last visit being 1.5 years ago and her next appointment scheduled for January 2025. She has not seen a dentist recently.    She is currently on Geodon twice daily, Vyvanse, Xanax, Lamictal, and bupropion for anxiety and depression. She has been diagnosed with ADD. She reports that Lamictal does not help with her migraines but is effective for her depression and anxiety. She is under the care of Dr. Cool for behavioral health, whom she sees every 2 months. She was prescribed Wellbutrin for racing thoughts following the death of her dog in January 2024, which she found distressing. She reports that her heart rate has increased since the death of her son, which she attributes to increased anxiety and stress.    Supplemental Information  She is on Pepcid twice daily for GERD symptoms. She is not currently taking vitamin D supplements but plans to start. She reports low iron levels, as evidenced by her rings turning her fingers blue. She had muscle atrophy in her leg, which has since resolved.    SOCIAL HISTORY  She works part-time.    FAMILY HISTORY  Her mother had melanoma on her face.    ALLERGIES  The  patient has had an allergic reaction to ZYRTEC, ALLEGRA, and SINGULAIR.    MEDICATIONS  Current: Claritin, Flonase, Mucinex, sumatriptan, naproxen, Geodon, Vyvanse, Xanax, Lamictal, Pepcid, bupropion, Wellbutrin, Reyvow (rarely used)  Discontinued: Zyrtec, Allegra, Singulair    IMMUNIZATIONS  She is up to date on her tetanus, diphtheria, and pertussis vaccines, and has received both doses of the shingles vaccine. She has not yet received her influenza vaccine.       Mildred French is a 54 y.o. female who presents for an Annual Wellness Visit.    CHRONIC CONDITIONS    Patient Active Problem List   Diagnosis    Primary insomnia    Vitamin D deficiency    Goiter, nontoxic, multinodular    Cervicalgia    Myalgia    Major depressive disorder, recurrent, moderate    Generalized anxiety disorder    Hair loss    Screening mammogram, encounter for    Irregular periods    Seasonal allergic rhinitis due to pollen    Other fatigue    Migraine with aura and without status migrainosus, not intractable    Slow transit constipation    Obstructive sleep apnea    Back pain    Overweight with body mass index (BMI) of 25 to 25.9 in adult    Right hip pain    Chest pain, atypical    Arthralgia of multiple sites    Hypercholesterolemia    Annual physical exam    Memory loss    Dizziness    Anemia due to blood loss, acute    Steroid side effects, initial encounter    Atrophy of muscle of left thigh    Pain of right thigh    Attention or concentration deficit        Past Medical History:   Diagnosis Date    Allergic     Anemia     Anxiety     B12 deficiency 01/15/2019    Recheck today    Bipolar II disorder     Fibromyalgia     Fibromyalgia, primary     GERD (gastroesophageal reflux disease)     Goiter     Iron deficiency 01/11/2019 6/11/2021 Roula Clayton MD  Continue taking 2 of the [place] vitamins with iron daily.    Major depressive disorder     Migraines     4-5 times a week     Scoliosis     Sleep apnea        Past  Surgical History:   Procedure Laterality Date    BARIATRIC SURGERY      BREAST SURGERY       SECTION  1997     SECTION  1988    COLONOSCOPY  2016    EYE SURGERY  Lasik 2008?    TONSILLECTOMY      WISDOM TOOTH EXTRACTION         Family History   Problem Relation Age of Onset    Thyroid disease Mother     Hypertension Mother     Hyperlipidemia Mother     Obesity Mother     Depression Mother     Obesity Sister     Depression Sister     Thyroid disease Sister     Anxiety disorder Sister     Thyroid disease Maternal Aunt     Coronary artery disease Maternal Grandmother     Thyroid disease Father     Hyperlipidemia Father     Hypertension Father     Vision loss Father     Diabetes Other     Sudden death Son 22        Brugada syn    Heart defect Son 21        Positive for Brugada syndrome.  No structural heart disease.  Asymptomatic    Breast cancer Neg Hx        Social History     Socioeconomic History    Marital status:    Tobacco Use    Smoking status: Never    Smokeless tobacco: Never   Vaping Use    Vaping status: Never Used   Substance and Sexual Activity    Alcohol use: Never    Drug use: Never    Sexual activity: Yes     Partners: Male     Birth control/protection: Essure       Allergies   Allergen Reactions    Atenolol Other (See Comments) and Unknown - Low Severity     Suicidal    Suicidal       Other reaction(s): Not available    Other Reaction(s) from [facility]: Suicidal thoughts      Other Reaction(s): Suicidal thoughts    Emgality [Galcanezumab-Gnlm] Unknown - Low Severity    Allegra Allergy [Fexofenadine Hcl] Anxiety    Cetirizine Anxiety and Other (See Comments)     Urinary Retention    Hydrocodone Unknown (See Comments) and Rash     Patient is not aware of a reaction to this drug   Patient is not aware of a reaction to this drug     Montelukast Sodium Anxiety    Qulipta [Atogepant] Nausea Only     Nausea and sleepiness     Topamax [Topiramate] Anxiety          Current Outpatient Medications:     ALPRAZolam (XANAX) 1 MG tablet, Take 1 tablet by mouth 2 (Two) Times a Day As Needed for Anxiety., Disp: , Rfl:     ALPRAZolam XR (XANAX XR) 1 MG 24 hr tablet, Take 1 tablet by mouth Every Morning., Disp: , Rfl:     bisacodyl 5 MG EC tablet, Take 1 tablet by mouth Daily As Needed for Constipation., Disp: 30 tablet, Rfl: 0    buPROPion SR (WELLBUTRIN SR) 150 MG 12 hr tablet, Take 2 tablets by mouth Daily., Disp: , Rfl:     cyclobenzaprine (FLEXERIL) 10 MG tablet, TAKE 1 TABLET BY MOUTH 2 TIMES A DAY AS NEEDED FOR MUSCLE SPASMS., Disp: 60 tablet, Rfl: 2    famotidine (PEPCID) 40 MG tablet, Take 1 tablet by mouth 2 (Two) Times a Day., Disp: 180 tablet, Rfl: 1    fluticasone (FLONASE) 50 MCG/ACT nasal spray, Administer 2 sprays into the nostril(s) as directed by provider Every Night., Disp: 16 g, Rfl: 5    lamoTRIgine (LaMICtal) 150 MG tablet, Take 2 tablets by mouth Every Morning., Disp: , Rfl: 1    naproxen (NAPROSYN) 500 MG tablet, TAKE 1 TABLET BY MOUTH TWICE A DAY WITH MEALS, Disp: 60 tablet, Rfl: 4    polyethylene glycol (GlycoLax) 17 GM/SCOOP powder, Take 17 g by mouth 2 (Two) Times a Day. (Patient taking differently: Take 17 g by mouth 2 (Two) Times a Day As Needed.), Disp: 850 g, Rfl: 11    promethazine (PHENERGAN) 25 MG tablet, TAKE 1 TABLET BY MOUTH EVERY 8 HOURS AS NEEDED FOR NAUSEA OR VOMITING., Disp: 12 tablet, Rfl: 3    SUMAtriptan (IMITREX) 100 MG tablet, Take one tablet at onset of headache. May repeat dose one time in 2 hours if headache not relieved.  Indications: Migraine Headache, Disp: 18 tablet, Rfl: 3    vitamin D3 (CVS D3) 125 MCG (5000 UT) capsule capsule, Take 1 capsule by mouth Daily., Disp: 90 capsule, Rfl: 3    VYVANSE 70 MG capsule, Take 1 capsule by mouth Every Morning, Disp: , Rfl:     ziprasidone (GEODON) 80 MG capsule, Take 1 capsule by mouth 2 (Two) Times a Day., Disp: , Rfl: 1    Immunization History   Administered Date(s) Administered     "COVID-19 (PFIZER) Purple Cap Monovalent 07/20/2021, 08/12/2021    Covid-19 (Pfizer) Gray Cap Monovalent 07/20/2021, 08/12/2021    Flu Vaccine Quad PF >36MO 10/06/2017    Fluzone (or Fluarix & Flulaval for VFC) >6mos 09/29/2023    Hep A, 2 Dose 04/08/2019    Hepatitis A 04/08/2019    Influenza, Unspecified 10/06/2017, 10/11/2021, 10/24/2022    Shingrix 04/21/2023, 07/28/2023    Tdap 02/21/2012, 07/29/2022    flucelvax quad pfs =>4 YRS 09/20/2019        Health Maintenance Due   Topic Date Due    HEPATITIS C SCREENING  Never done    MAMMOGRAM  09/22/2023    INFLUENZA VACCINE  07/01/2024    LIPID PANEL  07/21/2024    ANNUAL PHYSICAL  07/28/2024    COVID-19 Vaccine (5 - 2024-25 season) 09/01/2024        Review of Systems     Vital Signs  Vitals:    12/13/24 1333   BP: 104/78   BP Location: Left arm   Patient Position: Sitting   Cuff Size: Adult   Pulse: 108   Temp: 97.7 °F (36.5 °C)   TempSrc: Infrared   SpO2: 99%   Weight: 67.4 kg (148 lb 9.6 oz)   Height: 162.6 cm (64\")   PainSc: 0-No pain     BMI is >= 25 and <30. (Overweight) The following options were offered after discussion;: weight loss educational material (shared in after visit summary), exercise counseling/recommendations, nutrition counseling/recommendations, and Information on healthy weight added to patient's after visit summary.     Physical Exam  Vitals and nursing note reviewed.   Constitutional:       Appearance: She is well-developed and overweight.   HENT:      Head: Normocephalic.   Eyes:      Conjunctiva/sclera: Conjunctivae normal.      Pupils: Pupils are equal, round, and reactive to light.   Neck:      Thyroid: No thyromegaly.   Cardiovascular:      Rate and Rhythm: Normal rate and regular rhythm.      Heart sounds: Normal heart sounds.   Pulmonary:      Effort: Pulmonary effort is normal.      Breath sounds: Normal breath sounds. No wheezing.   Chest:   Breasts:     Right: No inverted nipple, mass, nipple discharge, skin change or tenderness.    "   Left: No inverted nipple, mass, nipple discharge, skin change or tenderness.   Abdominal:      General: Bowel sounds are normal.      Palpations: Abdomen is soft.      Tenderness: There is no abdominal tenderness.   Musculoskeletal:         General: No tenderness. Normal range of motion.      Cervical back: Normal range of motion and neck supple.   Lymphadenopathy:      Cervical: No cervical adenopathy.   Skin:     General: Skin is warm and dry.      Findings: No rash.   Neurological:      Mental Status: She is alert and oriented to person, place, and time.      Cranial Nerves: No cranial nerve deficit.      Sensory: No sensory deficit.      Coordination: Coordination normal.      Gait: Gait normal.   Psychiatric:         Attention and Perception: Attention normal.         Mood and Affect: Mood normal.         Speech: Speech normal.         Behavior: Behavior normal.         Thought Content: Thought content normal.         Cognition and Memory: Cognition normal.         Judgment: Judgment normal.            ECG 12 Lead    Date/Time: 12/13/2024 2:03 PM  Performed by: Roula Clayton MD    Authorized by: Roula Clayton MD  Comparison: compared with previous ECG   Similar to previous ECG  Rhythm: sinus tachycardia  Rate: normal  BPM: 108  Conduction: conduction normal  ST Segments: ST segments normal  T Waves: T waves normal  QRS axis: normal    Clinical impression: abnormal EKG           Fall Risk Screen:  STEADI Fall Risk Assessment has not been completed.    Health Habits and Functional and Cognitive Screening:       No data to display                Smoking Status:  Social History     Tobacco Use   Smoking Status Never   Smokeless Tobacco Never       Alcohol Consumption:  Social History     Substance and Sexual Activity   Alcohol Use Never       Depression [place]  PHQ-9:        12/13/2024     1:32 PM   PHQ-2/PHQ-9 Depression Screening   Little interest or pleasure in doing things Several days   Feeling  down, depressed, or hopeless Several days   Trouble falling or staying asleep, or sleeping too much Not at all   Feeling tired or having little energy Not at all   Poor appetite or overeating Not at all   Feeling bad about yourself - or that you are a failure or have let yourself or your family down Not at all   Trouble concentrating on things, such as reading the newspaper or watching television Not at all   Moving or speaking so slowly that other people could have noticed? Or the opposite - being so fidgety or restless that you have been moving around a lot more than usual. Not at all   Thoughts that you would be better off dead or hurting yourself in some way Not at all   Patient Health Questionnaire-9 Score 2   How difficult have these problems made it for you to do your work, take care of things at home, or get along with other people? Not difficult at all      Patient's depression is single episode and is mild without psychosis. Their depression is currently in full remission and the condition is improving with treatment. This will be reassessed  with behavioural health . F/U as described:patient will continue current medication therapy.     ACE III MINI        Labs  Results for orders placed or performed in visit on 07/21/23   Comprehensive Metabolic Panel    Collection Time: 07/21/23  7:49 AM    Specimen: Blood   Result Value Ref Range    Glucose 94 65 - 99 mg/dL    BUN 14 6 - 20 mg/dL    Creatinine 0.80 0.57 - 1.00 mg/dL    Sodium 136 136 - 145 mmol/L    Potassium 3.7 3.5 - 5.2 mmol/L    Chloride 104 98 - 107 mmol/L    CO2 22.9 22.0 - 29.0 mmol/L    Calcium 8.9 8.6 - 10.5 mg/dL    Total Protein 6.7 6.0 - 8.5 g/dL    Albumin 4.1 3.5 - 5.2 g/dL    ALT (SGPT) 7 1 - 33 U/L    AST (SGOT) 12 1 - 32 U/L    Alkaline Phosphatase 64 39 - 117 U/L    Total Bilirubin 0.2 0.0 - 1.2 mg/dL    Globulin 2.6 gm/dL    A/G Ratio 1.6 g/dL    BUN/Creatinine Ratio 17.5 7.0 - 25.0    Anion Gap 9.1 5.0 - 15.0 mmol/L    eGFR 89.3  >60.0 mL/min/1.73   Ferritin    Collection Time: 07/21/23  7:49 AM    Specimen: Blood   Result Value Ref Range    Ferritin 9.15 (L) 13.00 - 150.00 ng/mL   Iron Profile    Collection Time: 07/21/23  7:49 AM    Specimen: Blood   Result Value Ref Range    Iron 26 (L) 37 - 145 mcg/dL    Iron Saturation (TSAT) 6 (L) 20 - 50 %    Transferrin 280 200 - 360 mg/dL    TIBC 417 298 - 536 mcg/dL   Lipid Panel    Collection Time: 07/21/23  7:49 AM    Specimen: Blood   Result Value Ref Range    Total Cholesterol 163 0 - 200 mg/dL    Triglycerides 91 0 - 150 mg/dL    HDL Cholesterol 51 40 - 60 mg/dL    LDL Cholesterol  95 0 - 100 mg/dL    VLDL Cholesterol 17 5 - 40 mg/dL    LDL/HDL Ratio 1.84    TSH    Collection Time: 07/21/23  7:49 AM    Specimen: Blood   Result Value Ref Range    TSH 1.700 0.270 - 4.200 uIU/mL   T4, Free    Collection Time: 07/21/23  7:49 AM    Specimen: Blood   Result Value Ref Range    Free T4 1.15 0.93 - 1.70 ng/dL   Vitamin D,25-Hydroxy    Collection Time: 07/21/23  7:49 AM    Specimen: Blood   Result Value Ref Range    25 Hydroxy, Vitamin D 61.9 30.0 - 100.0 ng/ml   CBC Auto Differential    Collection Time: 07/21/23  7:49 AM    Specimen: Blood   Result Value Ref Range    WBC 3.89 3.40 - 10.80 10*3/mm3    RBC 4.27 3.77 - 5.28 10*6/mm3    Hemoglobin 10.8 (L) 12.0 - 15.9 g/dL    Hematocrit 35.0 34.0 - 46.6 %    MCV 82.0 79.0 - 97.0 fL    MCH 25.3 (L) 26.6 - 33.0 pg    MCHC 30.9 (L) 31.5 - 35.7 g/dL    RDW 15.0 12.3 - 15.4 %    RDW-SD 44.6 37.0 - 54.0 fl    MPV 9.6 6.0 - 12.0 fL    Platelets 293 140 - 450 10*3/mm3    Neutrophil % 54.7 42.7 - 76.0 %    Lymphocyte % 31.6 19.6 - 45.3 %    Monocyte % 9.0 5.0 - 12.0 %    Eosinophil % 2.3 0.3 - 6.2 %    Basophil % 2.1 (H) 0.0 - 1.5 %    Immature Grans % 0.3 0.0 - 0.5 %    Neutrophils, Absolute 2.13 1.70 - 7.00 10*3/mm3    Lymphocytes, Absolute 1.23 0.70 - 3.10 10*3/mm3    Monocytes, Absolute 0.35 0.10 - 0.90 10*3/mm3    Eosinophils, Absolute 0.09 0.00 - 0.40  10*3/mm3    Basophils, Absolute 0.08 0.00 - 0.20 10*3/mm3    Immature Grans, Absolute 0.01 0.00 - 0.05 10*3/mm3    nRBC 0.0 0.0 - 0.2 /100 WBC   Urinalysis without microscopic (no culture) - Urine, Clean Catch    Collection Time: 07/21/23  7:53 AM    Specimen: Urine, Clean Catch   Result Value Ref Range    Color, UA Dark Yellow (A) Yellow, Straw    Appearance, UA Clear Clear    pH, UA 5.5 5.0 - 8.0    Specific Gravity, UA >1.030 (H) 1.005 - 1.030    Glucose, UA Negative Negative    Ketones, UA Trace (A) Negative    Bilirubin, UA Small (1+) (A) Negative    Blood, UA Negative Negative    Protein, UA 30 mg/dL (1+) (A) Negative    Leuk Esterase, UA Negative Negative    Nitrite, UA Negative Negative    Urobilinogen, UA 1.0 E.U./dL 0.2 - 1.0 E.U./dL   Urinalysis, Microscopic Only - Urine, Clean Catch    Collection Time: 07/21/23  7:53 AM    Specimen: Urine, Clean Catch   Result Value Ref Range    RBC, UA None Seen None Seen, 0-2 /HPF    WBC, UA None Seen None Seen, 0-2 /HPF    Bacteria, UA Trace (A) None Seen /HPF    Squamous Epithelial Cells, UA 0-2 None Seen, 0-2 /HPF    Hyaline Casts, UA None Seen None Seen /LPF    Methodology Manual Light Microscopy       Results  Testing  EKG is normal.    Assessment & Plan     Patient Self-Management and Personalized Health Advice    The patient has been provided counseling and guidance about: diet, exercise, weight management, and mental health concerns and preventive services including:   Annual Wellness Visit (AWV).  Patient Instructions   Problem List Items Addressed This Visit          Cardiac and Vasculature    Hypercholesterolemia    Relevant Orders    ECG 12 Lead    CBC & Differential    Comprehensive Metabolic Panel    Lipid Panel    Microalbumin / Creatinine Urine Ratio - Urine, Clean Catch    Urinalysis With Microscopic - Urine, Clean Catch    TSH    Hemoglobin A1c    Vitamin B12       Endocrine and Metabolic    Vitamin D deficiency    Relevant Orders    Vitamin  D,25-Hydroxy       Health Encounters    Annual physical exam - Primary       Mental Health    Generalized anxiety disorder (Chronic)    Overview     Geodon, lamotrigine, Xanax, and Vyvanse.  Regular follow-up with the psychiatrist.             Relevant Medications    ziprasidone (GEODON) 80 MG capsule    VYVANSE 70 MG capsule    ALPRAZolam XR (XANAX XR) 1 MG 24 hr tablet    ALPRAZolam (XANAX) 1 MG tablet    buPROPion SR (WELLBUTRIN SR) 150 MG 12 hr tablet    Major depressive disorder, recurrent, moderate    Overview     Geodon, lamotrigine, Xanax, and Vyvanse.  Regular follow-up with the psychiatrist.             Relevant Medications    ziprasidone (GEODON) 80 MG capsule    VYVANSE 70 MG capsule    ALPRAZolam XR (XANAX XR) 1 MG 24 hr tablet    ALPRAZolam (XANAX) 1 MG tablet    buPROPion SR (WELLBUTRIN SR) 150 MG 12 hr tablet       Neuro    Attention or concentration deficit (Chronic)    Migraine with aura and without status migrainosus, not intractable    Overview     Seeing Dr. Oneil for Botox injections.Using sumatriptan and naproxen as needed for acute headaches.             Relevant Medications    lamoTRIgine (LaMICtal) 150 MG tablet    cyclobenzaprine (FLEXERIL) 10 MG tablet    naproxen (NAPROSYN) 500 MG tablet    buPROPion SR (WELLBUTRIN SR) 150 MG 12 hr tablet    SUMAtriptan (IMITREX) 100 MG tablet       Other    Overweight with body mass index (BMI) of 25 to 25.9 in adult (Chronic)          Diagnosis Plan   1. Annual physical exam        2. Hypercholesterolemia  ECG 12 Lead    CBC & Differential    Comprehensive Metabolic Panel    Lipid Panel    Microalbumin / Creatinine Urine Ratio - Urine, Clean Catch    Urinalysis With Microscopic - Urine, Clean Catch    TSH    Hemoglobin A1c    Vitamin B12      3. Migraine with aura and without status migrainosus, not intractable  SUMAtriptan (IMITREX) 100 MG tablet      4. Major depressive disorder, recurrent, moderate        5. Attention or concentration deficit         6. Generalized anxiety disorder        7. Overweight with body mass index (BMI) of 25 to 25.9 in adult        8. Vitamin D deficiency  Vitamin D,25-Hydroxy        Outpatient Encounter Medications as of 12/13/2024   Medication Sig Dispense Refill    ALPRAZolam (XANAX) 1 MG tablet Take 1 tablet by mouth 2 (Two) Times a Day As Needed for Anxiety.      ALPRAZolam XR (XANAX XR) 1 MG 24 hr tablet Take 1 tablet by mouth Every Morning.      bisacodyl 5 MG EC tablet Take 1 tablet by mouth Daily As Needed for Constipation. 30 tablet 0    buPROPion SR (WELLBUTRIN SR) 150 MG 12 hr tablet Take 2 tablets by mouth Daily.      cyclobenzaprine (FLEXERIL) 10 MG tablet TAKE 1 TABLET BY MOUTH 2 TIMES A DAY AS NEEDED FOR MUSCLE SPASMS. 60 tablet 2    famotidine (PEPCID) 40 MG tablet Take 1 tablet by mouth 2 (Two) Times a Day. 180 tablet 1    fluticasone (FLONASE) 50 MCG/ACT nasal spray Administer 2 sprays into the nostril(s) as directed by provider Every Night. 16 g 5    lamoTRIgine (LaMICtal) 150 MG tablet Take 2 tablets by mouth Every Morning.  1    naproxen (NAPROSYN) 500 MG tablet TAKE 1 TABLET BY MOUTH TWICE A DAY WITH MEALS 60 tablet 4    polyethylene glycol (GlycoLax) 17 GM/SCOOP powder Take 17 g by mouth 2 (Two) Times a Day. (Patient taking differently: Take 17 g by mouth 2 (Two) Times a Day As Needed.) 850 g 11    promethazine (PHENERGAN) 25 MG tablet TAKE 1 TABLET BY MOUTH EVERY 8 HOURS AS NEEDED FOR NAUSEA OR VOMITING. 12 tablet 3    SUMAtriptan (IMITREX) 100 MG tablet Take one tablet at onset of headache. May repeat dose one time in 2 hours if headache not relieved.  Indications: Migraine Headache 18 tablet 3    vitamin D3 (CVS D3) 125 MCG (5000 UT) capsule capsule Take 1 capsule by mouth Daily. 90 capsule 3    VYVANSE 70 MG capsule Take 1 capsule by mouth Every Morning      ziprasidone (GEODON) 80 MG capsule Take 1 capsule by mouth 2 (Two) Times a Day.  1    [DISCONTINUED] CVS D3 125 MCG (5000 UT) capsule capsule TAKE 1  "CAPSULE BY MOUTH EVERY DAY 90 capsule 3    [DISCONTINUED] fluconazole (DIFLUCAN) 150 MG tablet Take 1 tablet by mouth Daily As Needed (yeast vaginitis). 3 tablet 0    [DISCONTINUED] Lasmiditan Succinate (Reyvow) 100 MG tablet Take 100 mg by mouth 1 (One) Time As Needed (migraine) for up to 1 dose. Do not drive for 8 hours after taking medication. 8 tablet 0    [DISCONTINUED] loratadine (CLARITIN) 10 MG tablet Take 1 tablet by mouth Daily. 90 tablet 1    [DISCONTINUED] SUMAtriptan (IMITREX) 100 MG tablet Take one tablet at onset of headache. May repeat dose one time in 2 hours if headache not relieved. 18 tablet 3     No facility-administered encounter medications on file as of 12/13/2024.       Age appropriate preventive counseling done including age appropriate vaccines,regular  Mammogram and self breast exam, pap smear, colonoscopy, regular dental visits, mental health, injury prevention such as wearing seat belt and preventing falls, healthy  nutrition, healthy weight, regular physical exercise. Alcohol use is moderate.  Tobacco history-none. Drug use-none.  STD's-not at risk.         Objective   Vital Signs:  /78 (BP Location: Left arm, Patient Position: Sitting, Cuff Size: Adult)   Pulse 108   Temp 97.7 °F (36.5 °C) (Infrared)   Ht 162.6 cm (64\")   Wt 67.4 kg (148 lb 9.6 oz)   SpO2 99%   BMI 25.51 kg/m²     [unfilled]    The following data was reviewed by: Roula Clayton MD on 12/13/2024:               Assessment and Plan   Diagnoses and all orders for this visit:    1. Annual physical exam (Primary)    2. Hypercholesterolemia  -     ECG 12 Lead  -     CBC & Differential; Future  -     Comprehensive Metabolic Panel; Future  -     Lipid Panel; Future  -     Microalbumin / Creatinine Urine Ratio - Urine, Clean Catch; Future  -     Urinalysis With Microscopic - Urine, Clean Catch; Future  -     TSH; Future  -     Hemoglobin A1c; Future  -     Vitamin B12; Future    3. Migraine with aura and " without status migrainosus, not intractable  -     SUMAtriptan (IMITREX) 100 MG tablet; Take one tablet at onset of headache. May repeat dose one time in 2 hours if headache not relieved.  Indications: Migraine Headache  Dispense: 18 tablet; Refill: 3    4. Major depressive disorder, recurrent, moderate    5. Attention or concentration deficit    6. Generalized anxiety disorder    7. Overweight with body mass index (BMI) of 25 to 25.9 in adult    8. Vitamin D deficiency  -     Vitamin D,25-Hydroxy; Future    Other orders  -     vitamin D3 ([facility] D3) 125 MCG (5000 UT) capsule capsule; Take 1 capsule by mouth Daily.  Dispense: 90 capsule; Refill: 3      Assessment & Plan  Hypercholesterolemia  She will continue to improve low-fat healthy diet.  Try to increase walking and exercise.      Allergic rhinitis  She has been experiencing sinus congestion and drainage.  She has tried Claritin, Zyrtec, Allegra, and Singulair, but these medications have caused anxiety. She is currently taking Mucinex All-in-One (guaifenesin, dextromethorphan, Tylenol, phenylephrine) and Flonase twice a day, which helps her symptoms some.  She has an appointment with an allergist in April 2025 for further evaluation and allergy testing.    Migraine.  She is currently using sumatriptan as needed for migraines and has found it more effective than Reyvow, which she rarely used due to its cost and limited efficacy. She is also taking naproxen with sumatriptan without any stomach upset. She has been receiving Botox treatments from Dr. Oneil, with her fourth session this morning, which has helped reduce the frequency and severity of her migraines. A refill for sumatriptan will be sent to the pharmacy.    Anxiety and depression and ADD.  She is currently on Geodon twice a day, Vyvanse, Xanax (both long-acting and short-acting), Lamictal, and bupropion for anxiety and depression with ADD. She sees her behavioral health provider, Dr. Cool, every 2  months.  Exercise and physical activity also helps decrease symptoms of stress, anxiety, and mood.    Overweight with BMI of 25  She will continue to improve low-fat low sugar healthy diet.  Increase physical activity.  Walk the dog some every day before or after work.    Vitamin D deficiency she will continue taking vitamin D3 and calcium daily.    Health maintenance.  She is overdue for a mammogram and plans to reschedule it for January 2025.  She has not had an eye exam in about a year and a half but has an appointment in January 2025. She has not seen a dentist recently and needs to schedule a dental check-up. She is advised to receive her influenza vaccine on Monday at the pharmacy. She is encouraged to engage in regular physical activity, such as walking after work or before work, and to consider online Pilates classes or using a treadmill at home.    PROCEDURE  Colonoscopy was performed in 2017.          Follow Up   No follow-ups on file.  Patient was given instructions and counseling regarding her condition or for health maintenance advice. Please see specific information pulled into the AVS if appropriate.     Note: Part of this note may be an electronic transcription/translation of spoken language to printed text using the Dragon Dictation System.     Roula Clayton MD  Patient or patient representative verbalized consent for the use of Ambient Listening during the visit with  Roula Clayton MD for chart documentation. 12/13/2024  13:58 EST

## 2024-12-30 ENCOUNTER — LAB (OUTPATIENT)
Dept: LAB | Facility: HOSPITAL | Age: 54
End: 2024-12-30
Payer: COMMERCIAL

## 2024-12-30 DIAGNOSIS — E78.00 HYPERCHOLESTEROLEMIA: ICD-10-CM

## 2024-12-30 DIAGNOSIS — E55.9 VITAMIN D DEFICIENCY: ICD-10-CM

## 2024-12-30 LAB
25(OH)D3 SERPL-MCNC: 39.2 NG/ML (ref 30–100)
ALBUMIN SERPL-MCNC: 4 G/DL (ref 3.5–5.2)
ALBUMIN UR-MCNC: 2.8 MG/DL
ALBUMIN/GLOB SERPL: 1.3 G/DL
ALP SERPL-CCNC: 91 U/L (ref 39–117)
ALT SERPL W P-5'-P-CCNC: 19 U/L (ref 1–33)
ANION GAP SERPL CALCULATED.3IONS-SCNC: 9 MMOL/L (ref 5–15)
AST SERPL-CCNC: 19 U/L (ref 1–32)
BACTERIA UR QL AUTO: ABNORMAL /HPF
BASOPHILS # BLD AUTO: 0.07 10*3/MM3 (ref 0–0.2)
BASOPHILS NFR BLD AUTO: 1.3 % (ref 0–1.5)
BILIRUB SERPL-MCNC: 0.2 MG/DL (ref 0–1.2)
BILIRUB UR QL STRIP: NEGATIVE
BUN SERPL-MCNC: 15 MG/DL (ref 6–20)
BUN/CREAT SERPL: 18.8 (ref 7–25)
CALCIUM SPEC-SCNC: 8.8 MG/DL (ref 8.6–10.5)
CHLORIDE SERPL-SCNC: 105 MMOL/L (ref 98–107)
CHOLEST SERPL-MCNC: 238 MG/DL (ref 0–200)
CLARITY UR: ABNORMAL
CO2 SERPL-SCNC: 23 MMOL/L (ref 22–29)
COLOR UR: ABNORMAL
CREAT SERPL-MCNC: 0.8 MG/DL (ref 0.57–1)
CREAT UR-MCNC: 176.7 MG/DL
DEPRECATED RDW RBC AUTO: 43.4 FL (ref 37–54)
EGFRCR SERPLBLD CKD-EPI 2021: 88.8 ML/MIN/1.73
EOSINOPHIL # BLD AUTO: 0.05 10*3/MM3 (ref 0–0.4)
EOSINOPHIL NFR BLD AUTO: 0.9 % (ref 0.3–6.2)
ERYTHROCYTE [DISTWIDTH] IN BLOOD BY AUTOMATED COUNT: 13.2 % (ref 12.3–15.4)
GLOBULIN UR ELPH-MCNC: 3 GM/DL
GLUCOSE SERPL-MCNC: 81 MG/DL (ref 65–99)
GLUCOSE UR STRIP-MCNC: NEGATIVE MG/DL
HBA1C MFR BLD: 4.8 % (ref 4.8–5.6)
HCT VFR BLD AUTO: 42.1 % (ref 34–46.6)
HDLC SERPL-MCNC: 44 MG/DL (ref 40–60)
HGB BLD-MCNC: 14.1 G/DL (ref 12–15.9)
HGB UR QL STRIP.AUTO: NEGATIVE
HYALINE CASTS UR QL AUTO: ABNORMAL /LPF
IMM GRANULOCYTES # BLD AUTO: 0.02 10*3/MM3 (ref 0–0.05)
IMM GRANULOCYTES NFR BLD AUTO: 0.4 % (ref 0–0.5)
KETONES UR QL STRIP: NEGATIVE
LDLC SERPL CALC-MCNC: 163 MG/DL (ref 0–100)
LDLC/HDLC SERPL: 3.64 {RATIO}
LEUKOCYTE ESTERASE UR QL STRIP.AUTO: ABNORMAL
LYMPHOCYTES # BLD AUTO: 1.58 10*3/MM3 (ref 0.7–3.1)
LYMPHOCYTES NFR BLD AUTO: 29 % (ref 19.6–45.3)
MCH RBC QN AUTO: 29.8 PG (ref 26.6–33)
MCHC RBC AUTO-ENTMCNC: 33.5 G/DL (ref 31.5–35.7)
MCV RBC AUTO: 89 FL (ref 79–97)
MICROALBUMIN/CREAT UR: 15.8 MG/G (ref 0–29)
MONOCYTES # BLD AUTO: 0.46 10*3/MM3 (ref 0.1–0.9)
MONOCYTES NFR BLD AUTO: 8.4 % (ref 5–12)
NEUTROPHILS NFR BLD AUTO: 3.27 10*3/MM3 (ref 1.7–7)
NEUTROPHILS NFR BLD AUTO: 60 % (ref 42.7–76)
NITRITE UR QL STRIP: NEGATIVE
NRBC BLD AUTO-RTO: 0 /100 WBC (ref 0–0.2)
PH UR STRIP.AUTO: 6 [PH] (ref 5–8)
PLATELET # BLD AUTO: 274 10*3/MM3 (ref 140–450)
PMV BLD AUTO: 9.4 FL (ref 6–12)
POTASSIUM SERPL-SCNC: 4.2 MMOL/L (ref 3.5–5.2)
PROT SERPL-MCNC: 7 G/DL (ref 6–8.5)
PROT UR QL STRIP: ABNORMAL
RBC # BLD AUTO: 4.73 10*6/MM3 (ref 3.77–5.28)
RBC # UR STRIP: ABNORMAL /HPF
REF LAB TEST METHOD: ABNORMAL
SODIUM SERPL-SCNC: 137 MMOL/L (ref 136–145)
SP GR UR STRIP: 1.02 (ref 1–1.03)
SQUAMOUS #/AREA URNS HPF: ABNORMAL /HPF
TRIGL SERPL-MCNC: 169 MG/DL (ref 0–150)
TSH SERPL DL<=0.05 MIU/L-ACNC: 1.48 UIU/ML (ref 0.27–4.2)
UROBILINOGEN UR QL STRIP: ABNORMAL
VIT B12 BLD-MCNC: 417 PG/ML (ref 211–946)
VLDLC SERPL-MCNC: 31 MG/DL (ref 5–40)
WBC # UR STRIP: ABNORMAL /HPF
WBC NRBC COR # BLD AUTO: 5.45 10*3/MM3 (ref 3.4–10.8)

## 2024-12-30 PROCEDURE — 83036 HEMOGLOBIN GLYCOSYLATED A1C: CPT

## 2024-12-30 PROCEDURE — 82607 VITAMIN B-12: CPT

## 2024-12-30 PROCEDURE — 80061 LIPID PANEL: CPT

## 2024-12-30 PROCEDURE — 82306 VITAMIN D 25 HYDROXY: CPT

## 2024-12-30 PROCEDURE — 82043 UR ALBUMIN QUANTITATIVE: CPT

## 2024-12-30 PROCEDURE — 82570 ASSAY OF URINE CREATININE: CPT

## 2024-12-30 PROCEDURE — 81001 URINALYSIS AUTO W/SCOPE: CPT

## 2024-12-30 PROCEDURE — 80050 GENERAL HEALTH PANEL: CPT

## 2025-01-02 DIAGNOSIS — D62 ANEMIA DUE TO BLOOD LOSS, ACUTE: Primary | ICD-10-CM

## 2025-02-04 ENCOUNTER — HOSPITAL ENCOUNTER (OUTPATIENT)
Dept: MAMMOGRAPHY | Facility: HOSPITAL | Age: 55
Discharge: HOME | End: 2025-02-04
Admitting: PHYSICIAN ASSISTANT
Payer: COMMERCIAL

## 2025-02-04 DIAGNOSIS — R92.8 ABNORMAL MAMMOGRAM: ICD-10-CM

## 2025-02-04 PROCEDURE — 77065 DX MAMMO INCL CAD UNI: CPT | Performed by: RADIOLOGY

## 2025-02-04 PROCEDURE — 77065 DX MAMMO INCL CAD UNI: CPT

## 2025-02-04 PROCEDURE — 77061 BREAST TOMOSYNTHESIS UNI: CPT | Performed by: RADIOLOGY

## 2025-02-04 PROCEDURE — G0279 TOMOSYNTHESIS, MAMMO: HCPCS

## 2025-02-11 ENCOUNTER — TELEPHONE (OUTPATIENT)
Dept: INTERNAL MEDICINE | Facility: CLINIC | Age: 55
End: 2025-02-11
Payer: COMMERCIAL

## 2025-02-11 NOTE — TELEPHONE ENCOUNTER
Advised patient via my chart that the insurance company has approved the medication Reyvow 100 mg tablet. The approval is from  02/11/25 to 2/11/26.    A copy of this letter will be scanned into her chart

## 2025-04-29 RX ORDER — CYCLOBENZAPRINE HCL 10 MG
10 TABLET ORAL 2 TIMES DAILY PRN
Qty: 60 TABLET | Refills: 2 | Status: SHIPPED | OUTPATIENT
Start: 2025-04-29

## 2025-04-29 RX ORDER — FAMOTIDINE 40 MG/1
40 TABLET, FILM COATED ORAL 2 TIMES DAILY
Qty: 180 TABLET | Refills: 1 | Status: SHIPPED | OUTPATIENT
Start: 2025-04-29

## 2025-04-29 NOTE — TELEPHONE ENCOUNTER
Rx Refill Note  Requested Prescriptions     Pending Prescriptions Disp Refills    cyclobenzaprine (FLEXERIL) 10 MG tablet 60 tablet 2     Sig: Take 1 tablet by mouth 2 (Two) Times a Day As Needed for Muscle Spasms.    famotidine (PEPCID) 40 MG tablet 180 tablet 1     Sig: Take 1 tablet by mouth 2 (Two) Times a Day.      Last office visit with prescribing clinician: 12/13/2024   Last telemedicine visit with prescribing clinician: Visit date not found   Next office visit with prescribing clinician: Visit date not found                         Would you like a call back once the refill request has been completed: [] Yes [] No    If the office needs to give you a call back, can they leave a voicemail: [] Yes [] No    Vicky Pagan MA  04/29/25, 09:27 EDT

## 2025-05-20 DIAGNOSIS — G43.109 MIGRAINE WITH AURA AND WITHOUT STATUS MIGRAINOSUS, NOT INTRACTABLE: ICD-10-CM

## 2025-05-20 RX ORDER — SUMATRIPTAN SUCCINATE 100 MG/1
TABLET ORAL
Qty: 18 TABLET | Refills: 3 | Status: SHIPPED | OUTPATIENT
Start: 2025-05-20

## 2025-07-28 RX ORDER — CYCLOBENZAPRINE HCL 10 MG
10 TABLET ORAL 2 TIMES DAILY PRN
Qty: 60 TABLET | Refills: 2 | Status: SHIPPED | OUTPATIENT
Start: 2025-07-28

## 2025-08-11 RX ORDER — PROMETHAZINE HYDROCHLORIDE 25 MG/1
25 TABLET ORAL EVERY 8 HOURS PRN
Qty: 12 TABLET | Refills: 3 | Status: SHIPPED | OUTPATIENT
Start: 2025-08-11